# Patient Record
Sex: MALE | Race: WHITE | NOT HISPANIC OR LATINO
[De-identification: names, ages, dates, MRNs, and addresses within clinical notes are randomized per-mention and may not be internally consistent; named-entity substitution may affect disease eponyms.]

---

## 2017-06-21 ENCOUNTER — APPOINTMENT (OUTPATIENT)
Dept: OPHTHALMOLOGY | Facility: CLINIC | Age: 82
End: 2017-06-21

## 2017-12-20 ENCOUNTER — APPOINTMENT (OUTPATIENT)
Dept: OPHTHALMOLOGY | Facility: CLINIC | Age: 82
End: 2017-12-20
Payer: MEDICARE

## 2017-12-20 PROCEDURE — 92083 EXTENDED VISUAL FIELD XM: CPT

## 2017-12-20 PROCEDURE — 92012 INTRM OPH EXAM EST PATIENT: CPT

## 2017-12-20 PROCEDURE — 92133 CPTRZD OPH DX IMG PST SGM ON: CPT

## 2018-03-27 ENCOUNTER — APPOINTMENT (OUTPATIENT)
Dept: FAMILY MEDICINE | Facility: CLINIC | Age: 83
End: 2018-03-27
Payer: MEDICARE

## 2018-03-27 ENCOUNTER — LABORATORY RESULT (OUTPATIENT)
Age: 83
End: 2018-03-27

## 2018-03-27 ENCOUNTER — NON-APPOINTMENT (OUTPATIENT)
Age: 83
End: 2018-03-27

## 2018-03-27 VITALS
BODY MASS INDEX: 30.05 KG/M2 | HEART RATE: 86 BPM | HEIGHT: 66 IN | OXYGEN SATURATION: 99 % | RESPIRATION RATE: 14 BRPM | SYSTOLIC BLOOD PRESSURE: 122 MMHG | DIASTOLIC BLOOD PRESSURE: 56 MMHG | WEIGHT: 187 LBS

## 2018-03-27 DIAGNOSIS — M19.90 UNSPECIFIED OSTEOARTHRITIS, UNSPECIFIED SITE: ICD-10-CM

## 2018-03-27 DIAGNOSIS — Z81.8 FAMILY HISTORY OF OTHER MENTAL AND BEHAVIORAL DISORDERS: ICD-10-CM

## 2018-03-27 DIAGNOSIS — M48.00 SPINAL STENOSIS, SITE UNSPECIFIED: ICD-10-CM

## 2018-03-27 DIAGNOSIS — R35.0 FREQUENCY OF MICTURITION: ICD-10-CM

## 2018-03-27 PROCEDURE — 93000 ELECTROCARDIOGRAM COMPLETE: CPT

## 2018-03-27 PROCEDURE — 99204 OFFICE O/P NEW MOD 45 MIN: CPT | Mod: 25

## 2018-03-28 ENCOUNTER — APPOINTMENT (OUTPATIENT)
Dept: FAMILY MEDICINE | Facility: CLINIC | Age: 83
End: 2018-03-28
Payer: MEDICARE

## 2018-03-28 PROCEDURE — 36415 COLL VENOUS BLD VENIPUNCTURE: CPT

## 2018-03-29 LAB
ALBUMIN SERPL ELPH-MCNC: 4 G/DL
ALP BLD-CCNC: 113 U/L
ALT SERPL-CCNC: 12 U/L
ANION GAP SERPL CALC-SCNC: 16 MMOL/L
APPEARANCE: CLEAR
AST SERPL-CCNC: 22 U/L
BASOPHILS # BLD AUTO: 0.04 K/UL
BASOPHILS NFR BLD AUTO: 0.6 %
BILIRUB SERPL-MCNC: 0.4 MG/DL
BILIRUBIN URINE: NEGATIVE
BLOOD URINE: NEGATIVE
BUN SERPL-MCNC: 58 MG/DL
CALCIUM SERPL-MCNC: 9.9 MG/DL
CHLORIDE SERPL-SCNC: 104 MMOL/L
CHOLEST SERPL-MCNC: 166 MG/DL
CHOLEST/HDLC SERPL: 2.6 RATIO
CO2 SERPL-SCNC: 23 MMOL/L
COLOR: YELLOW
CREAT SERPL-MCNC: 2.13 MG/DL
EOSINOPHIL # BLD AUTO: 0.32 K/UL
EOSINOPHIL NFR BLD AUTO: 4.9 %
FERRITIN SERPL-MCNC: 56 NG/ML
FOLATE SERPL-MCNC: 12.4 NG/ML
GLUCOSE QUALITATIVE U: NEGATIVE MG/DL
GLUCOSE SERPL-MCNC: 146 MG/DL
HBA1C MFR BLD HPLC: 6.5 %
HCT VFR BLD CALC: 36.5 %
HDLC SERPL-MCNC: 65 MG/DL
HGB BLD-MCNC: 11.4 G/DL
IMM GRANULOCYTES NFR BLD AUTO: 0.3 %
IRON SATN MFR SERPL: 15 %
IRON SERPL-MCNC: 39 UG/DL
KETONES URINE: NEGATIVE
LDLC SERPL CALC-MCNC: 91 MG/DL
LEUKOCYTE ESTERASE URINE: ABNORMAL
LYMPHOCYTES # BLD AUTO: 1.56 K/UL
LYMPHOCYTES NFR BLD AUTO: 23.8 %
MAN DIFF?: NORMAL
MCHC RBC-ENTMCNC: 30 PG
MCHC RBC-ENTMCNC: 31.2 GM/DL
MCV RBC AUTO: 96.1 FL
MONOCYTES # BLD AUTO: 0.53 K/UL
MONOCYTES NFR BLD AUTO: 8.1 %
NEUTROPHILS # BLD AUTO: 4.08 K/UL
NEUTROPHILS NFR BLD AUTO: 62.3 %
NITRITE URINE: NEGATIVE
PH URINE: 6
PLATELET # BLD AUTO: 291 K/UL
POTASSIUM SERPL-SCNC: 4.3 MMOL/L
PROT SERPL-MCNC: 6.8 G/DL
PROTEIN URINE: NEGATIVE MG/DL
RBC # BLD: 3.8 M/UL
RBC # FLD: 17.8 %
SODIUM SERPL-SCNC: 143 MMOL/L
SPECIFIC GRAVITY URINE: 1.01
T4 SERPL-MCNC: 5.1 UG/DL
TIBC SERPL-MCNC: 252 UG/DL
TRIGL SERPL-MCNC: 48 MG/DL
TSH SERPL-ACNC: 2.22 UIU/ML
UIBC SERPL-MCNC: 213 UG/DL
URATE SERPL-MCNC: 8.4 MG/DL
UROBILINOGEN URINE: NEGATIVE MG/DL
VIT B12 SERPL-MCNC: 627 PG/ML
WBC # FLD AUTO: 6.55 K/UL

## 2018-04-04 ENCOUNTER — APPOINTMENT (OUTPATIENT)
Dept: NEPHROLOGY | Facility: CLINIC | Age: 83
End: 2018-04-04
Payer: MEDICARE

## 2018-04-04 VITALS
SYSTOLIC BLOOD PRESSURE: 130 MMHG | WEIGHT: 182 LBS | DIASTOLIC BLOOD PRESSURE: 62 MMHG | RESPIRATION RATE: 14 BRPM | TEMPERATURE: 97.8 F | HEIGHT: 64.5 IN | BODY MASS INDEX: 30.69 KG/M2 | HEART RATE: 75 BPM | OXYGEN SATURATION: 98 %

## 2018-04-04 PROCEDURE — 99205 OFFICE O/P NEW HI 60 MIN: CPT

## 2018-04-04 RX ORDER — PNV NO.95/FERROUS FUM/FOLIC AC 28MG-0.8MG
TABLET ORAL DAILY
Refills: 0 | Status: ACTIVE | COMMUNITY

## 2018-04-05 ENCOUNTER — APPOINTMENT (OUTPATIENT)
Dept: CARDIOLOGY | Facility: CLINIC | Age: 83
End: 2018-04-05
Payer: MEDICARE

## 2018-04-05 ENCOUNTER — NON-APPOINTMENT (OUTPATIENT)
Age: 83
End: 2018-04-05

## 2018-04-05 VITALS
OXYGEN SATURATION: 97 % | DIASTOLIC BLOOD PRESSURE: 74 MMHG | HEIGHT: 64.5 IN | SYSTOLIC BLOOD PRESSURE: 129 MMHG | BODY MASS INDEX: 30.69 KG/M2 | HEART RATE: 67 BPM | WEIGHT: 182 LBS

## 2018-04-05 LAB
CREAT SPEC-SCNC: 68 MG/DL
CREAT/PROT UR: 0.1 RATIO
PROT UR-MCNC: 5 MG/DL

## 2018-04-05 PROCEDURE — 99204 OFFICE O/P NEW MOD 45 MIN: CPT | Mod: 25

## 2018-04-16 ENCOUNTER — MEDICATION RENEWAL (OUTPATIENT)
Age: 83
End: 2018-04-16

## 2018-04-25 ENCOUNTER — NON-APPOINTMENT (OUTPATIENT)
Age: 83
End: 2018-04-25

## 2018-06-20 ENCOUNTER — APPOINTMENT (OUTPATIENT)
Dept: OPHTHALMOLOGY | Facility: CLINIC | Age: 83
End: 2018-06-20

## 2018-07-17 ENCOUNTER — APPOINTMENT (OUTPATIENT)
Dept: CARDIOLOGY | Facility: CLINIC | Age: 83
End: 2018-07-17

## 2018-07-18 ENCOUNTER — APPOINTMENT (OUTPATIENT)
Dept: NEPHROLOGY | Facility: CLINIC | Age: 83
End: 2018-07-18

## 2018-08-03 ENCOUNTER — NON-APPOINTMENT (OUTPATIENT)
Age: 83
End: 2018-08-03

## 2018-08-03 ENCOUNTER — APPOINTMENT (OUTPATIENT)
Dept: CARDIOLOGY | Facility: CLINIC | Age: 83
End: 2018-08-03
Payer: MEDICARE

## 2018-08-03 VITALS
SYSTOLIC BLOOD PRESSURE: 147 MMHG | HEART RATE: 87 BPM | WEIGHT: 190 LBS | DIASTOLIC BLOOD PRESSURE: 78 MMHG | HEIGHT: 64.5 IN | BODY MASS INDEX: 32.04 KG/M2 | OXYGEN SATURATION: 100 %

## 2018-08-03 PROCEDURE — 93000 ELECTROCARDIOGRAM COMPLETE: CPT

## 2018-08-03 PROCEDURE — 99213 OFFICE O/P EST LOW 20 MIN: CPT | Mod: 25

## 2018-08-03 RX ORDER — ASPIRIN ENTERIC COATED TABLETS 81 MG 81 MG/1
81 TABLET, DELAYED RELEASE ORAL DAILY
Refills: 0 | Status: DISCONTINUED | COMMUNITY
End: 2018-08-03

## 2018-08-08 ENCOUNTER — APPOINTMENT (OUTPATIENT)
Dept: NEPHROLOGY | Facility: CLINIC | Age: 83
End: 2018-08-08
Payer: MEDICARE

## 2018-08-08 VITALS
SYSTOLIC BLOOD PRESSURE: 134 MMHG | HEIGHT: 64.5 IN | OXYGEN SATURATION: 97 % | HEART RATE: 85 BPM | WEIGHT: 195 LBS | BODY MASS INDEX: 32.89 KG/M2 | DIASTOLIC BLOOD PRESSURE: 60 MMHG

## 2018-08-08 PROCEDURE — 99215 OFFICE O/P EST HI 40 MIN: CPT | Mod: 25

## 2018-08-08 PROCEDURE — 36415 COLL VENOUS BLD VENIPUNCTURE: CPT

## 2018-08-09 LAB
APPEARANCE: CLEAR
BACTERIA: NEGATIVE
BASOPHILS # BLD AUTO: 0.02 K/UL
BASOPHILS NFR BLD AUTO: 0.3 %
BILIRUBIN URINE: NEGATIVE
BLOOD URINE: NEGATIVE
COLOR: YELLOW
EOSINOPHIL # BLD AUTO: 0.12 K/UL
EOSINOPHIL NFR BLD AUTO: 1.8 %
ESTIMATED AVERAGE GLUCOSE: 131 MG/DL
GLUCOSE QUALITATIVE U: NEGATIVE MG/DL
HBA1C MFR BLD HPLC: 6.2 %
HCT VFR BLD CALC: 38.4 %
HGB BLD-MCNC: 12.3 G/DL
HYALINE CASTS: 0 /LPF
IMM GRANULOCYTES NFR BLD AUTO: 0.3 %
KETONES URINE: NEGATIVE
LEUKOCYTE ESTERASE URINE: ABNORMAL
LYMPHOCYTES # BLD AUTO: 1.46 K/UL
LYMPHOCYTES NFR BLD AUTO: 22.4 %
MAN DIFF?: NORMAL
MCHC RBC-ENTMCNC: 31 PG
MCHC RBC-ENTMCNC: 32 GM/DL
MCV RBC AUTO: 96.7 FL
MICROSCOPIC-UA: NORMAL
MONOCYTES # BLD AUTO: 0.45 K/UL
MONOCYTES NFR BLD AUTO: 6.9 %
NEUTROPHILS # BLD AUTO: 4.44 K/UL
NEUTROPHILS NFR BLD AUTO: 68.3 %
NITRITE URINE: NEGATIVE
PH URINE: 5
PLATELET # BLD AUTO: 246 K/UL
PROTEIN URINE: NEGATIVE MG/DL
RBC # BLD: 3.97 M/UL
RBC # FLD: 16.3 %
RED BLOOD CELLS URINE: 1 /HPF
SPECIFIC GRAVITY URINE: 1.01
SQUAMOUS EPITHELIAL CELLS: 1 /HPF
TSH SERPL-ACNC: 2.07 UIU/ML
URATE SERPL-MCNC: 7.1 MG/DL
UROBILINOGEN URINE: NEGATIVE MG/DL
WBC # FLD AUTO: 6.51 K/UL
WHITE BLOOD CELLS URINE: 18 /HPF

## 2018-08-14 LAB
ALBUMIN SERPL ELPH-MCNC: 4.5 G/DL
ANION GAP SERPL CALC-SCNC: 22 MMOL/L
BUN SERPL-MCNC: 63 MG/DL
CALCIUM SERPL-MCNC: 9.4 MG/DL
CHLORIDE SERPL-SCNC: 100 MMOL/L
CO2 SERPL-SCNC: 16 MMOL/L
CREAT SERPL-MCNC: 2.12 MG/DL
CREAT SPEC-SCNC: 21 MG/DL
CREAT/PROT UR: NORMAL
GLUCOSE SERPL-MCNC: 64 MG/DL
PHOSPHATE SERPL-MCNC: 4.5 MG/DL
POTASSIUM SERPL-SCNC: 5.1 MMOL/L
PROT UR-MCNC: <4 MG/DL
SODIUM SERPL-SCNC: 138 MMOL/L

## 2018-08-16 ENCOUNTER — APPOINTMENT (OUTPATIENT)
Dept: FAMILY MEDICINE | Facility: CLINIC | Age: 83
End: 2018-08-16
Payer: MEDICARE

## 2018-08-16 PROCEDURE — 99214 OFFICE O/P EST MOD 30 MIN: CPT

## 2018-08-16 NOTE — HISTORY OF PRESENT ILLNESS
[Family Member] : family member [FreeTextEntry1] : here for follow up [de-identified] : 87 yr old male is here with DIL\par  Multiple medical problems. Moved to [facility] recently after being Hospitalized. Released from [facility] 2/18.\par Hx of Afib on Eliquis\par Anemia: iron deficiency, recent labs, Hb 12.3\par CKD last creatinine 2.13, Hx of partial nephrectomy for benign tumor. Seen by Nephrologist last week.

## 2018-08-16 NOTE — PHYSICAL EXAM

## 2018-08-16 NOTE — ASSESSMENT
[FreeTextEntry1] : A Fib: On Eliquis\par Cardiology consult reviewed\par Anemia:  labs reviewed\par CKD, last creatinine 2.13. Seen by Nephrology, follow up in 6 months

## 2018-08-17 ENCOUNTER — APPOINTMENT (OUTPATIENT)
Dept: CARDIOLOGY | Facility: CLINIC | Age: 83
End: 2018-08-17

## 2018-10-20 ENCOUNTER — MEDICATION RENEWAL (OUTPATIENT)
Age: 83
End: 2018-10-20

## 2018-11-09 ENCOUNTER — RX RENEWAL (OUTPATIENT)
Age: 83
End: 2018-11-09

## 2018-11-28 ENCOUNTER — RX RENEWAL (OUTPATIENT)
Age: 83
End: 2018-11-28

## 2018-12-05 ENCOUNTER — LABORATORY RESULT (OUTPATIENT)
Age: 83
End: 2018-12-05

## 2018-12-05 ENCOUNTER — APPOINTMENT (OUTPATIENT)
Dept: FAMILY MEDICINE | Facility: CLINIC | Age: 83
End: 2018-12-05
Payer: MEDICARE

## 2018-12-05 VITALS
RESPIRATION RATE: 16 BRPM | DIASTOLIC BLOOD PRESSURE: 64 MMHG | WEIGHT: 196 LBS | OXYGEN SATURATION: 98 % | BODY MASS INDEX: 33.46 KG/M2 | SYSTOLIC BLOOD PRESSURE: 120 MMHG | HEIGHT: 64 IN | TEMPERATURE: 97.8 F | HEART RATE: 93 BPM

## 2018-12-05 PROCEDURE — 36415 COLL VENOUS BLD VENIPUNCTURE: CPT

## 2018-12-05 PROCEDURE — 99214 OFFICE O/P EST MOD 30 MIN: CPT | Mod: 25

## 2018-12-05 NOTE — PHYSICAL EXAM

## 2018-12-05 NOTE — HISTORY OF PRESENT ILLNESS
[Family Member] : family member [FreeTextEntry1] : here for follow up [de-identified] : 88 yr old male is here for follow up\par Rash on abdomen for 6 weeks. Itchy healing\par  Multiple medical problems. Moved to [facility] house recently after being Hospitalized. Released from [facility] 2/18.\par Hx of Afib on Eliquis\par Anemia: iron deficiency, recent labs, Hb 12.3\par CKD last creatinine 2.13, Hx of partial nephrectomy for benign tumor. Seen by Nephrologist last week.\par Seen by Nephrologist has follow up 2/18

## 2018-12-05 NOTE — ASSESSMENT
[FreeTextEntry1] : A Fib: On Eliquis\par Cardiology consult reviewed\par Anemia:  labs reviewed\par CKD, last creatinine 2.13. Seen by Nephrology, follow up in 6 months\par Rash/ ? shingles/ Dermatitis\par Steroid cream for 1 week\par Follow up if not improved. No pain

## 2018-12-12 LAB
ALBUMIN SERPL ELPH-MCNC: 4.3 G/DL
ALP BLD-CCNC: 102 U/L
ALT SERPL-CCNC: 15 U/L
ANION GAP SERPL CALC-SCNC: 14 MMOL/L
APPEARANCE: CLEAR
AST SERPL-CCNC: 21 U/L
BASOPHILS # BLD AUTO: 0.02 K/UL
BASOPHILS NFR BLD AUTO: 0.2 %
BILIRUB SERPL-MCNC: 0.6 MG/DL
BILIRUBIN URINE: NEGATIVE
BLOOD URINE: NEGATIVE
BUN SERPL-MCNC: 40 MG/DL
CALCIUM SERPL-MCNC: 9.7 MG/DL
CHLORIDE SERPL-SCNC: 105 MMOL/L
CHOLEST SERPL-MCNC: 180 MG/DL
CHOLEST/HDLC SERPL: 2.5 RATIO
CO2 SERPL-SCNC: 22 MMOL/L
COLOR: YELLOW
CREAT SERPL-MCNC: 1.92 MG/DL
EOSINOPHIL # BLD AUTO: 0.6 K/UL
EOSINOPHIL NFR BLD AUTO: 7.5 %
FERRITIN SERPL-MCNC: 209 NG/ML
FOLATE SERPL-MCNC: 11 NG/ML
GLUCOSE QUALITATIVE U: NEGATIVE MG/DL
GLUCOSE SERPL-MCNC: 113 MG/DL
HBA1C MFR BLD HPLC: 6.1 %
HCT VFR BLD CALC: 38 %
HDLC SERPL-MCNC: 72 MG/DL
HGB BLD-MCNC: 12.5 G/DL
IMM GRANULOCYTES NFR BLD AUTO: 0.4 %
IRON SATN MFR SERPL: 25 %
IRON SERPL-MCNC: 65 UG/DL
KETONES URINE: NEGATIVE
LDLC SERPL CALC-MCNC: 98 MG/DL
LEUKOCYTE ESTERASE URINE: ABNORMAL
LYMPHOCYTES # BLD AUTO: 1.38 K/UL
LYMPHOCYTES NFR BLD AUTO: 17.2 %
MAN DIFF?: NORMAL
MCHC RBC-ENTMCNC: 31.2 PG
MCHC RBC-ENTMCNC: 32.9 GM/DL
MCV RBC AUTO: 94.8 FL
MONOCYTES # BLD AUTO: 0.75 K/UL
MONOCYTES NFR BLD AUTO: 9.3 %
NEUTROPHILS # BLD AUTO: 5.25 K/UL
NEUTROPHILS NFR BLD AUTO: 65.4 %
NITRITE URINE: NEGATIVE
PH URINE: 5
PLATELET # BLD AUTO: 209 K/UL
POTASSIUM SERPL-SCNC: 4.6 MMOL/L
PROT SERPL-MCNC: 6.8 G/DL
PROTEIN URINE: NEGATIVE MG/DL
RBC # BLD: 4.01 M/UL
RBC # FLD: 15.7 %
SODIUM SERPL-SCNC: 141 MMOL/L
SPECIFIC GRAVITY URINE: 1.01
T4 SERPL-MCNC: 5.1 UG/DL
TIBC SERPL-MCNC: 263 UG/DL
TRIGL SERPL-MCNC: 52 MG/DL
TSH SERPL-ACNC: 2.75 UIU/ML
UIBC SERPL-MCNC: 198 UG/DL
URATE SERPL-MCNC: 6.7 MG/DL
UROBILINOGEN URINE: NEGATIVE MG/DL
VIT B12 SERPL-MCNC: 714 PG/ML
WBC # FLD AUTO: 8.03 K/UL

## 2018-12-28 ENCOUNTER — RX RENEWAL (OUTPATIENT)
Age: 83
End: 2018-12-28

## 2019-02-05 ENCOUNTER — NON-APPOINTMENT (OUTPATIENT)
Age: 84
End: 2019-02-05

## 2019-02-05 ENCOUNTER — APPOINTMENT (OUTPATIENT)
Dept: CARDIOLOGY | Facility: CLINIC | Age: 84
End: 2019-02-05
Payer: MEDICARE

## 2019-02-05 VITALS — HEART RATE: 75 BPM | HEIGHT: 64 IN | OXYGEN SATURATION: 97 % | BODY MASS INDEX: 32.78 KG/M2 | WEIGHT: 192 LBS

## 2019-02-05 PROCEDURE — 93000 ELECTROCARDIOGRAM COMPLETE: CPT

## 2019-02-05 PROCEDURE — 99214 OFFICE O/P EST MOD 30 MIN: CPT | Mod: 25

## 2019-02-05 NOTE — DISCUSSION/SUMMARY
[FreeTextEntry1] : Pt is an 89 y/o M with PMH HTN, HLD, PVD, afib on Eliquis, CKD who presents today for f/u.  \par TTE 02/2018 shows EF = 60-65% without significant valvular disease - repeat TTE\par afib: HR well controlled.  He denies any bleeding issues on Eliquis.  CHADSVasc = 5\par HTN: well controlled, c/w norvasc, losartan, lasix\par PVD: discuss statin at next OV, c/w ASA\par Check stress test to eval for ischemia \par He will f/u in 3 mnths\par The described plan was discussed with the pt.  All questions and concerns were addressed to the best of my knowledge.\par

## 2019-02-05 NOTE — HISTORY OF PRESENT ILLNESS
[FreeTextEntry1] : Pt is a 87 y/o M who presents today for f/u. PMH afib on Eliquis, HTN, PVD s/p RLE stent, HLD.  He states that he is overall feeling ok, notes some KING.  Pt denies CP, SOB, diaphoresis, palpitations, dizziness, syncope, LE edema, PND.\par He is compliant with medications.  He is accompanied by his son\par \par PMH: PVD s/p peripheral stent RLE with Dr Singh, afib on Eliquis, ambulates with cane, HLD, HTN, colon cancer\par Smoking status: quit in 1963\par Current exercise: none\par Daily water intake: 50 oz\par Daily caffeine intake: 1-2 cups coffee\par OTC medications: ASA\par Family hx: pt denies any immediate family history cardiac disease\par Previous cardiac testing: unsure\par Previous hospitalizations: gout/cellulitis\par surgeries: colon cancer, partial nephrectomy 2006 benign mass\par

## 2019-02-05 NOTE — PHYSICAL EXAM
[General Appearance - Well Developed] : well developed [Normal Appearance] : normal appearance [Well Groomed] : well groomed [General Appearance - Well Nourished] : well nourished [No Deformities] : no deformities [General Appearance - In No Acute Distress] : no acute distress [Normal Conjunctiva] : the conjunctiva exhibited no abnormalities [Eyelids - No Xanthelasma] : the eyelids demonstrated no xanthelasmas [Normal Oral Mucosa] : normal oral mucosa [No Oral Pallor] : no oral pallor [No Oral Cyanosis] : no oral cyanosis [Respiration, Rhythm And Depth] : normal respiratory rhythm and effort [Exaggerated Use Of Accessory Muscles For Inspiration] : no accessory muscle use [Auscultation Breath Sounds / Voice Sounds] : lungs were clear to auscultation bilaterally [Heart Rate And Rhythm] : heart rate and rhythm were normal [Heart Sounds] : normal S1 and S2 [Murmurs] : no murmurs present [Arterial Pulses Normal] : the arterial pulses were normal [Edema] : no peripheral edema present [Abdomen Soft] : soft [Abdomen Tenderness] : non-tender [Abdomen Mass (___ Cm)] : no abdominal mass palpated [Abnormal Walk] : normal gait [Gait - Sufficient For Exercise Testing] : the gait was sufficient for exercise testing [Nail Clubbing] : no clubbing of the fingernails [Cyanosis, Localized] : no localized cyanosis [Petechial Hemorrhages (___cm)] : no petechial hemorrhages [] : no ischemic changes [Oriented To Time, Place, And Person] : oriented to person, place, and time [Impaired Insight] : insight and judgment were intact [Affect] : the affect was normal [Mood] : the mood was normal [No Anxiety] : not feeling anxious [FreeTextEntry1] : no JVD or bruits

## 2019-02-06 ENCOUNTER — APPOINTMENT (OUTPATIENT)
Dept: NEPHROLOGY | Facility: CLINIC | Age: 84
End: 2019-02-06
Payer: MEDICARE

## 2019-02-06 VITALS
WEIGHT: 194 LBS | HEART RATE: 72 BPM | SYSTOLIC BLOOD PRESSURE: 120 MMHG | DIASTOLIC BLOOD PRESSURE: 62 MMHG | OXYGEN SATURATION: 98 % | HEIGHT: 64 IN | BODY MASS INDEX: 33.12 KG/M2

## 2019-02-06 PROCEDURE — 99215 OFFICE O/P EST HI 40 MIN: CPT

## 2019-02-06 NOTE — ASSESSMENT
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

## 2019-02-06 NOTE — PHYSICAL EXAM
[General Appearance - Alert] : alert [General Appearance - In No Acute Distress] : in no acute distress [General Appearance - Well Nourished] : well nourished [General Appearance - Well Developed] : well developed [Sclera] : the sclera and conjunctiva were normal [Outer Ear] : the ears and nose were normal in appearance [Hearing Threshold Finger Rub Not [place]] : hearing was normal [Both Tympanic Membranes Were Examined] : both tympanic membranes were normal [Neck Appearance] : the appearance of the neck was normal [Neck Cervical Mass (___cm)] : no neck mass was observed [] : no respiratory distress [Respiration, Rhythm And Depth] : normal respiratory rhythm and effort [Auscultation Breath Sounds / Voice Sounds] : lungs were clear to auscultation bilaterally [Heart Rate And Rhythm] : heart rate was normal and rhythm regular [Heart Sounds] : normal S1 and S2 [Arterial Pulses Carotid] : carotid pulses were normal with no bruits [Bowel Sounds] : normal bowel sounds [Abdomen Soft] : soft [Abdomen Tenderness] : non-tender [No CVA Tenderness] : no ~M costovertebral angle tenderness [Abnormal Walk] : normal gait [Skin Color & Pigmentation] : normal skin color and pigmentation [Cranial Nerves] : cranial nerves 2-12 were intact [Oriented To Time, Place, And Person] : oriented to person, place, and time

## 2019-02-06 NOTE — HISTORY OF PRESENT ILLNESS
[FreeTextEntry1] : Patient is an 87 year old male with past medical history of BPH, HTN, Gout, Pre-Diabetic, benign R sided renal mass s/p partial nephrectomy in 2006, here for initial evaluation of elevated SCr. Patient is followed by Dr. Suggs. Patient seen and examined; is in no distress; accompanied by his daughter; able to provide full history; feels well; appears well. Patient states he drank quite a bit of alcohol in the past; now drinks a glass of wine or beer with dinner. Found to have Cr of 2.13 with some mild anemia. Pt seen and examined today for follow up. Has been feeling very well; drinking wine daily and enjoying life. Good energy, good appetite; lives with daughter. \par \par Current: Pt seen and examined; no complaints; feels well; last GFR 30

## 2019-02-14 ENCOUNTER — APPOINTMENT (OUTPATIENT)
Dept: CARDIOLOGY | Facility: CLINIC | Age: 84
End: 2019-02-14
Payer: MEDICARE

## 2019-02-14 PROCEDURE — 93306 TTE W/DOPPLER COMPLETE: CPT

## 2019-02-19 LAB
ALBUMIN SERPL ELPH-MCNC: 4.1 G/DL
ANION GAP SERPL CALC-SCNC: 17 MMOL/L
APPEARANCE: CLEAR
BACTERIA: ABNORMAL
BILIRUBIN URINE: NEGATIVE
BLOOD URINE: NEGATIVE
BUN SERPL-MCNC: 64 MG/DL
CALCIUM SERPL-MCNC: 9.5 MG/DL
CHLORIDE SERPL-SCNC: 102 MMOL/L
CO2 SERPL-SCNC: 20 MMOL/L
COLOR: YELLOW
CREAT SERPL-MCNC: 2.47 MG/DL
CREAT SPEC-SCNC: 122 MG/DL
CREAT/PROT UR: 0.1 RATIO
GLUCOSE QUALITATIVE U: NEGATIVE MG/DL
GLUCOSE SERPL-MCNC: 121 MG/DL
HYALINE CASTS: 14 /LPF
KETONES URINE: NEGATIVE
LEUKOCYTE ESTERASE URINE: ABNORMAL
MICROSCOPIC-UA: NORMAL
NITRITE URINE: NEGATIVE
PH URINE: 5
PHOSPHATE SERPL-MCNC: 3.6 MG/DL
POTASSIUM SERPL-SCNC: 4.6 MMOL/L
PROT UR-MCNC: 8 MG/DL
PROTEIN URINE: NEGATIVE MG/DL
RED BLOOD CELLS URINE: 2 /HPF
SODIUM SERPL-SCNC: 139 MMOL/L
SPECIFIC GRAVITY URINE: 1.01
SQUAMOUS EPITHELIAL CELLS: 3 /HPF
UROBILINOGEN URINE: NEGATIVE MG/DL
WHITE BLOOD CELLS URINE: 32 /HPF

## 2019-02-20 ENCOUNTER — APPOINTMENT (OUTPATIENT)
Dept: CARDIOLOGY | Facility: CLINIC | Age: 84
End: 2019-02-20
Payer: MEDICARE

## 2019-02-20 PROCEDURE — 78452 HT MUSCLE IMAGE SPECT MULT: CPT

## 2019-02-20 PROCEDURE — A9500: CPT

## 2019-02-20 PROCEDURE — 93017 CV STRESS TEST TRACING ONLY: CPT

## 2019-02-20 PROCEDURE — 93018 CV STRESS TEST I&R ONLY: CPT

## 2019-02-21 ENCOUNTER — OTHER (OUTPATIENT)
Age: 84
End: 2019-02-21

## 2019-02-22 ENCOUNTER — APPOINTMENT (OUTPATIENT)
Dept: CARDIOLOGY | Facility: CLINIC | Age: 84
End: 2019-02-22
Payer: MEDICARE

## 2019-02-26 ENCOUNTER — RX RENEWAL (OUTPATIENT)
Age: 84
End: 2019-02-26

## 2019-02-27 ENCOUNTER — RX RENEWAL (OUTPATIENT)
Age: 84
End: 2019-02-27

## 2019-03-01 ENCOUNTER — RX RENEWAL (OUTPATIENT)
Age: 84
End: 2019-03-01

## 2019-03-26 ENCOUNTER — APPOINTMENT (OUTPATIENT)
Dept: FAMILY MEDICINE | Facility: CLINIC | Age: 84
End: 2019-03-26

## 2019-04-15 ENCOUNTER — RX RENEWAL (OUTPATIENT)
Age: 84
End: 2019-04-15

## 2019-04-18 ENCOUNTER — LABORATORY RESULT (OUTPATIENT)
Age: 84
End: 2019-04-18

## 2019-04-18 ENCOUNTER — APPOINTMENT (OUTPATIENT)
Dept: FAMILY MEDICINE | Facility: CLINIC | Age: 84
End: 2019-04-18
Payer: MEDICARE

## 2019-04-18 VITALS
TEMPERATURE: 97.6 F | OXYGEN SATURATION: 98 % | HEART RATE: 55 BPM | BODY MASS INDEX: 31.92 KG/M2 | RESPIRATION RATE: 16 BRPM | WEIGHT: 187 LBS | HEIGHT: 64 IN | DIASTOLIC BLOOD PRESSURE: 60 MMHG | SYSTOLIC BLOOD PRESSURE: 110 MMHG

## 2019-04-18 DIAGNOSIS — Z13.1 ENCOUNTER FOR SCREENING FOR DIABETES MELLITUS: ICD-10-CM

## 2019-04-18 DIAGNOSIS — Z13.29 ENCOUNTER FOR SCREENING FOR OTHER SUSPECTED ENDOCRINE DISORDER: ICD-10-CM

## 2019-04-18 DIAGNOSIS — L30.9 DERMATITIS, UNSPECIFIED: ICD-10-CM

## 2019-04-18 PROCEDURE — 36415 COLL VENOUS BLD VENIPUNCTURE: CPT

## 2019-04-18 PROCEDURE — 99397 PER PM REEVAL EST PAT 65+ YR: CPT | Mod: 25

## 2019-04-18 PROCEDURE — G0444 DEPRESSION SCREEN ANNUAL: CPT | Mod: 59

## 2019-04-18 RX ORDER — ASPIRIN 81 MG
81 TABLET, DELAYED RELEASE (ENTERIC COATED) ORAL
Refills: 0 | Status: COMPLETED | COMMUNITY
End: 2019-04-18

## 2019-04-18 RX ORDER — FUROSEMIDE 40 MG/1
40 TABLET ORAL
Qty: 90 | Refills: 0 | Status: COMPLETED | COMMUNITY
Start: 2017-09-12 | End: 2019-04-18

## 2019-04-18 RX ORDER — MOMETASONE FUROATE 1 MG/G
0.1 CREAM TOPICAL TWICE DAILY
Qty: 1 | Refills: 0 | Status: COMPLETED | COMMUNITY
Start: 2018-12-05 | End: 2019-04-18

## 2019-04-18 NOTE — HISTORY OF PRESENT ILLNESS
[FreeTextEntry1] : cpe [de-identified] : Patient is here for a physical. \par Overall he is doing well, no major complaints. \par He has a rash on his abdomen.  Was using steroid cream but it did not help.

## 2019-04-18 NOTE — PHYSICAL EXAM
[No Acute Distress] : no acute distress [Well Nourished] : well nourished [Well Developed] : well developed [Well-Appearing] : well-appearing [Normal Sclera/Conjunctiva] : normal sclera/conjunctiva [PERRL] : pupils equal round and reactive to light [EOMI] : extraocular movements intact [Normal Oropharynx] : the oropharynx was normal [Normal Outer Ear/Nose] : the outer ears and nose were normal in appearance [Normal TMs] : both tympanic membranes were normal [Supple] : supple [No Lymphadenopathy] : no lymphadenopathy [Thyroid Normal, No Nodules] : the thyroid was normal and there were no nodules present [No Respiratory Distress] : no respiratory distress  [Clear to Auscultation] : lungs were clear to auscultation bilaterally [No Accessory Muscle Use] : no accessory muscle use [Normal S1, S2] : normal S1 and S2 [Pedal Pulses Present] : the pedal pulses are present [No Edema] : there was no peripheral edema [Soft] : abdomen soft [Non Tender] : non-tender [Non-distended] : non-distended [No HSM] : no HSM [No Masses] : no abdominal mass palpated [Normal Bowel Sounds] : normal bowel sounds [Normal Posterior Cervical Nodes] : no posterior cervical lymphadenopathy [Normal Anterior Cervical Nodes] : no anterior cervical lymphadenopathy [No CVA Tenderness] : no CVA  tenderness [No Spinal Tenderness] : no spinal tenderness [No Joint Swelling] : no joint swelling [Grossly Normal Strength/Tone] : grossly normal strength/tone [Normal Gait] : normal gait [No Focal Deficits] : no focal deficits [Deep Tendon Reflexes (DTR)] : deep tendon reflexes were 2+ and symmetric [Coordination Grossly Intact] : coordination grossly intact [Normal Insight/Judgement] : insight and judgment were intact [Normal Affect] : the affect was normal [de-identified] : rash on abdomen - erythematous, dry flaky skin [de-identified] : bradycardia

## 2019-04-18 NOTE — ASSESSMENT
[FreeTextEntry1] : Health maintenance\par - comprehensive labs\par - had EKG and cardiac testing in February\par - believes he had pneumonia vaccine at pharmacy this season -will check his record at home\par - negative depression screening \par \par Dermatitis\par - not resolved with steroid cream\par - ? fungal\par - start clotrimazole / betameth cream\par - start antihistamine for itch\par - if not resolving would recommend seeing dermatology \par \par Hypertension\par - well controlled\par - continue current meds\par \par Atrial fibrillation\par - rate controlled\par - on Eliquis\par \par CKD\par - following regularly with nephrology\par - has appointment next month\par - kidney function stable\par - will check labs today

## 2019-04-18 NOTE — HEALTH RISK ASSESSMENT
[Good] : ~his/her~  mood as  good [No falls in past year] : Patient reported no falls in the past year [0] : 2) Feeling down, depressed, or hopeless: Not at all (0) [HIV Test offered] : HIV Test offered [Hepatitis C test offered] : Hepatitis C test offered [None] : None [With Family] : lives with family [Retired] : retired [] :  [Feels Safe at Home] : Feels safe at home [Fully functional (bathing, dressing, toileting, transferring, walking, feeding)] : Fully functional (bathing, dressing, toileting, transferring, walking, feeding) [Fully functional (using the telephone, shopping, preparing meals, housekeeping, doing laundry, using] : Fully functional and needs no help or supervision to perform IADLs (using the telephone, shopping, preparing meals, housekeeping, doing laundry, using transportation, managing medications and managing finances) [Smoke Detector] : smoke detector [Carbon Monoxide Detector] : carbon monoxide detector [Seat Belt] :  uses seat belt [With Patient/Caregiver] : With Patient/Caregiver [Name: ___] : Health Care Proxy's Name: [unfilled]  [Relationship: ___] : Relationship: [unfilled] [] : No [de-identified] : drinks daily, one with dinner  [YearQuit] : 1963 [[id number]] : 0 [Change in mental status noted] : No change in mental status noted [Language] : denies difficulty with language [Behavior] : denies difficulty with behavior [Reasoning] : denies difficulty with reasoning [Reports changes in hearing] : Reports no changes in hearing [Reports changes in dental health] : Reports no changes in dental health [Reports changes in vision] : Reports no changes in vision [ColonoscopyComments] : no longer required [AdvancecareDate] : 04/19

## 2019-04-18 NOTE — COUNSELING
[Healthy eating counseling provided] : healthy eating [Activity counseling provided] : activity [Good understanding] : Patient has a good understanding of disease, goals and obesity follow-up plan [ - Annual Depression Screening] : Annual Depression Screening [de-identified] : diet - no restrictions\par exercise - tries to remain active, walking

## 2019-04-19 LAB
25(OH)D3 SERPL-MCNC: 29 NG/ML
ALBUMIN SERPL ELPH-MCNC: 4.5 G/DL
ALP BLD-CCNC: 115 U/L
ALT SERPL-CCNC: 18 U/L
ANION GAP SERPL CALC-SCNC: 15 MMOL/L
APPEARANCE: CLEAR
AST SERPL-CCNC: 19 U/L
BASOPHILS # BLD AUTO: 0.05 K/UL
BASOPHILS NFR BLD AUTO: 0.8 %
BILIRUB SERPL-MCNC: 0.6 MG/DL
BILIRUBIN URINE: NEGATIVE
BLOOD URINE: NEGATIVE
BUN SERPL-MCNC: 75 MG/DL
CALCIUM SERPL-MCNC: 9.4 MG/DL
CHLORIDE SERPL-SCNC: 104 MMOL/L
CHOLEST SERPL-MCNC: 153 MG/DL
CHOLEST/HDLC SERPL: 2.3 RATIO
CO2 SERPL-SCNC: 22 MMOL/L
COLOR: COLORLESS
CREAT SERPL-MCNC: 2.72 MG/DL
EOSINOPHIL # BLD AUTO: 0.32 K/UL
EOSINOPHIL NFR BLD AUTO: 5.3 %
ESTIMATED AVERAGE GLUCOSE: 123 MG/DL
GLUCOSE QUALITATIVE U: NEGATIVE
GLUCOSE SERPL-MCNC: 133 MG/DL
HBA1C MFR BLD HPLC: 5.9 %
HCT VFR BLD CALC: 36.9 %
HDLC SERPL-MCNC: 66 MG/DL
HGB BLD-MCNC: 11.9 G/DL
HIV1+2 AB SPEC QL IA.RAPID: NONREACTIVE
IMM GRANULOCYTES NFR BLD AUTO: 0.3 %
IRON SATN MFR SERPL: 20 %
IRON SERPL-MCNC: 53 UG/DL
KETONES URINE: NEGATIVE
LDLC SERPL CALC-MCNC: 79 MG/DL
LEUKOCYTE ESTERASE URINE: ABNORMAL
LYMPHOCYTES # BLD AUTO: 1.09 K/UL
LYMPHOCYTES NFR BLD AUTO: 18 %
MAN DIFF?: NORMAL
MCHC RBC-ENTMCNC: 32.1 PG
MCHC RBC-ENTMCNC: 32.2 GM/DL
MCV RBC AUTO: 99.5 FL
MONOCYTES # BLD AUTO: 0.58 K/UL
MONOCYTES NFR BLD AUTO: 9.6 %
NEUTROPHILS # BLD AUTO: 3.99 K/UL
NEUTROPHILS NFR BLD AUTO: 66 %
NITRITE URINE: NEGATIVE
PH URINE: 5
PLATELET # BLD AUTO: 204 K/UL
POTASSIUM SERPL-SCNC: 5.1 MMOL/L
PROT SERPL-MCNC: 6.6 G/DL
PROTEIN URINE: NEGATIVE
RBC # BLD: 3.71 M/UL
RBC # FLD: 15.2 %
SODIUM SERPL-SCNC: 141 MMOL/L
SPECIFIC GRAVITY URINE: 1.01
T4 FREE SERPL-MCNC: 1.1 NG/DL
TIBC SERPL-MCNC: 267 UG/DL
TRIGL SERPL-MCNC: 39 MG/DL
TSH SERPL-ACNC: 3.42 UIU/ML
UIBC SERPL-MCNC: 214 UG/DL
UROBILINOGEN URINE: NORMAL
WBC # FLD AUTO: 6.05 K/UL

## 2019-04-30 ENCOUNTER — RX RENEWAL (OUTPATIENT)
Age: 84
End: 2019-04-30

## 2019-05-07 ENCOUNTER — OTHER (OUTPATIENT)
Age: 84
End: 2019-05-07

## 2019-05-07 ENCOUNTER — RX RENEWAL (OUTPATIENT)
Age: 84
End: 2019-05-07

## 2019-05-15 ENCOUNTER — APPOINTMENT (OUTPATIENT)
Dept: NEPHROLOGY | Facility: CLINIC | Age: 84
End: 2019-05-15
Payer: MEDICARE

## 2019-05-15 VITALS
HEIGHT: 64 IN | BODY MASS INDEX: 31.92 KG/M2 | SYSTOLIC BLOOD PRESSURE: 108 MMHG | WEIGHT: 187 LBS | DIASTOLIC BLOOD PRESSURE: 58 MMHG

## 2019-05-15 PROCEDURE — 99215 OFFICE O/P EST HI 40 MIN: CPT

## 2019-05-15 RX ORDER — LOSARTAN POTASSIUM 50 MG/1
50 TABLET, FILM COATED ORAL DAILY
Qty: 90 | Refills: 1 | Status: DISCONTINUED | COMMUNITY
Start: 2019-04-15 | End: 2019-05-15

## 2019-05-15 NOTE — HISTORY OF PRESENT ILLNESS
[FreeTextEntry1] : Patient is an 87 year old male with past medical history of BPH, HTN, Gout, Pre-Diabetic, benign R sided renal mass s/p partial nephrectomy in 2006, here for initial evaluation of elevated SCr. Patient is followed by Dr. Suggs. Patient seen and examined; is in no distress; accompanied by his daughter; able to provide full history; feels well; appears well. Patient states he drank quite a bit of alcohol in the past; now drinks a glass of wine or beer with dinner. Found to have Cr of 2.13 with some mild anemia. Pt seen and examined today for follow up. Has been feeling very well; drinking wine daily and enjoying life. Good energy, good appetite; lives with daughter. Pt seen and examined; no complaints; feels well; last GFR 30\par \par Current: Pt seen/examined; doing well; Cr slightly up; 2.72 last month. Meds reviewed

## 2019-05-15 NOTE — ASSESSMENT
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

## 2019-05-15 NOTE — PHYSICAL EXAM
[General Appearance - Alert] : alert [General Appearance - In No Acute Distress] : in no acute distress [General Appearance - Well Nourished] : well nourished [General Appearance - Well Developed] : well developed [Outer Ear] : the ears and nose were normal in appearance [Sclera] : the sclera and conjunctiva were normal [Hearing Threshold Finger Rub Not [place]] : hearing was normal [Both Tympanic Membranes Were Examined] : both tympanic membranes were normal [Neck Appearance] : the appearance of the neck was normal [Neck Cervical Mass (___cm)] : no neck mass was observed [] : the neck was supple [Auscultation Breath Sounds / Voice Sounds] : lungs were clear to auscultation bilaterally [Respiration, Rhythm And Depth] : normal respiratory rhythm and effort [Arterial Pulses Carotid] : carotid pulses were normal with no bruits [Heart Sounds] : normal S1 and S2 [Heart Rate And Rhythm] : heart rate was normal and rhythm regular [Bowel Sounds] : normal bowel sounds [Abdomen Soft] : soft [Abdomen Tenderness] : non-tender [No CVA Tenderness] : no ~M costovertebral angle tenderness [Abnormal Walk] : normal gait [Skin Color & Pigmentation] : normal skin color and pigmentation [Oriented To Time, Place, And Person] : oriented to person, place, and time [Cranial Nerves] : cranial nerves 2-12 were intact

## 2019-05-20 ENCOUNTER — RX RENEWAL (OUTPATIENT)
Age: 84
End: 2019-05-20

## 2019-05-28 ENCOUNTER — TRANSCRIPTION ENCOUNTER (OUTPATIENT)
Age: 84
End: 2019-05-28

## 2019-06-07 ENCOUNTER — RX RENEWAL (OUTPATIENT)
Age: 84
End: 2019-06-07

## 2019-07-08 ENCOUNTER — RX RENEWAL (OUTPATIENT)
Age: 84
End: 2019-07-08

## 2019-07-08 ENCOUNTER — APPOINTMENT (OUTPATIENT)
Dept: INTERNAL MEDICINE | Facility: CLINIC | Age: 84
End: 2019-07-08
Payer: MEDICARE

## 2019-07-08 VITALS
DIASTOLIC BLOOD PRESSURE: 80 MMHG | TEMPERATURE: 97.6 F | BODY MASS INDEX: 34.66 KG/M2 | HEIGHT: 64 IN | WEIGHT: 203 LBS | HEART RATE: 54 BPM | RESPIRATION RATE: 16 BRPM | OXYGEN SATURATION: 96 % | SYSTOLIC BLOOD PRESSURE: 132 MMHG

## 2019-07-08 PROCEDURE — 99214 OFFICE O/P EST MOD 30 MIN: CPT | Mod: 25

## 2019-07-08 PROCEDURE — 36415 COLL VENOUS BLD VENIPUNCTURE: CPT

## 2019-07-08 RX ORDER — CLOTRIMAZOLE AND BETAMETHASONE DIPROPIONATE 10; .5 MG/G; MG/G
1-0.05 CREAM TOPICAL TWICE DAILY
Qty: 1 | Refills: 0 | Status: DISCONTINUED | COMMUNITY
Start: 2019-04-18 | End: 2019-07-08

## 2019-07-08 NOTE — HISTORY OF PRESENT ILLNESS
[FreeTextEntry8] : CC: LE edema\par \par LE edema: Patient complaining of LE edema b/l that started more than one month ago. He states it occurred gradually and feels it more around knees and thighs.  Has been taking lasix 40mg in total daily and urination is at baseline.  He does endorse high salt intake. +weight gain\par Last ECHO completed in February. \par Denies orthopnea or KING. \par \par HTN: Controlled; denies CP or dizziness\par Last change with medication by renal, stopped ARB and increased norvasc to 10mg in May\par \par Afib: on AC; last saw cardiology 2/19, will be making appt soon \par \par PVD: s/p stent in RLE\par  on ASA/AC, denies pain in legs\par \par CKD: Worsening-last Cr increased to 2.72\par Follows w/ renal, has appt pending April 17\par

## 2019-07-08 NOTE — PHYSICAL EXAM
[No Acute Distress] : no acute distress [No Respiratory Distress] : no respiratory distress  [No Accessory Muscle Use] : no accessory muscle use [Clear to Auscultation] : lungs were clear to auscultation bilaterally [Normal S1, S2] : normal S1 and S2 [de-identified] : Bradycardiac [de-identified] : +1 edema b/l  [de-identified] : +erythema of RLE-chronic per patient

## 2019-07-08 NOTE — REVIEW OF SYSTEMS
[Lower Ext Edema] : lower extremity edema [Skin Rash] : skin rash [Fever] : no fever [Chills] : no chills [Chest Pain] : no chest pain [Palpitations] : no palpitations [Claudication] : no  leg claudication [Orthopena] : no orthopnea [Shortness Of Breath] : no shortness of breath [Cough] : no cough

## 2019-07-09 LAB
ALBUMIN SERPL ELPH-MCNC: 4 G/DL
ALP BLD-CCNC: 288 U/L
ALT SERPL-CCNC: 113 U/L
ANION GAP SERPL CALC-SCNC: 15 MMOL/L
AST SERPL-CCNC: 60 U/L
BILIRUB SERPL-MCNC: 0.6 MG/DL
BUN SERPL-MCNC: 46 MG/DL
CALCIUM SERPL-MCNC: 9.3 MG/DL
CHLORIDE SERPL-SCNC: 106 MMOL/L
CO2 SERPL-SCNC: 21 MMOL/L
CREAT SERPL-MCNC: 2.32 MG/DL
GLUCOSE SERPL-MCNC: 141 MG/DL
POTASSIUM SERPL-SCNC: 4.3 MMOL/L
PROT SERPL-MCNC: 6.2 G/DL
SODIUM SERPL-SCNC: 142 MMOL/L

## 2019-07-12 ENCOUNTER — APPOINTMENT (OUTPATIENT)
Dept: INTERNAL MEDICINE | Facility: CLINIC | Age: 84
End: 2019-07-12
Payer: MEDICARE

## 2019-07-12 VITALS
SYSTOLIC BLOOD PRESSURE: 126 MMHG | RESPIRATION RATE: 16 BRPM | DIASTOLIC BLOOD PRESSURE: 52 MMHG | BODY MASS INDEX: 34.66 KG/M2 | HEIGHT: 64 IN | OXYGEN SATURATION: 97 % | WEIGHT: 203 LBS | HEART RATE: 76 BPM

## 2019-07-12 DIAGNOSIS — R74.0 NONSPECIFIC ELEVATION OF LEVELS OF TRANSAMINASE AND LACTIC ACID DEHYDROGENASE [LDH]: ICD-10-CM

## 2019-07-12 PROCEDURE — 36415 COLL VENOUS BLD VENIPUNCTURE: CPT

## 2019-07-12 PROCEDURE — 99214 OFFICE O/P EST MOD 30 MIN: CPT | Mod: 25

## 2019-07-12 NOTE — REVIEW OF SYSTEMS
[Lower Ext Edema] : lower extremity edema [Fever] : no fever [Chills] : no chills [Chest Pain] : no chest pain [Palpitations] : no palpitations [Orthopena] : no orthopnea [Shortness Of Breath] : no shortness of breath [Dizziness] : no dizziness

## 2019-07-12 NOTE — HISTORY OF PRESENT ILLNESS
[FreeTextEntry1] : CC: Follow up on chronic conditions, bilateral leg edema [de-identified] : 1. Bilateral leg edema:  Had increased his lasix due to bilateral leg edema. States tolerating it well, BP stable and denies dizziness.\par Has seen significant improvement in edema in thighs and feels his pants are now fitting looser.  Poor compliance w/ low sodium intake as per daughter in law Cindy, eats out dinner every night including [employer].  Also w/ daily alcohol intake.\par +weight loss since his last visit\par \par 2. Afib: on AC, denies palpitations but has not scheduled appt w/ cardiology as advised\par Last ECHO w/ hyperdynamic systolic function, indeterminate diastolic function\par \par 3. HTN: Controlled; denies dizziness/CP\par \par 4. CKD: Cr stable, will repeat given increase in lasix\par Has appt pending w/ renal next week\par \par 5. Transaminitis: New onset, hx of heavy alcohol use in the past with hard liquor (vodka)\par States now only drinking beer & wine, but does do it daily\par \par \par

## 2019-07-12 NOTE — PHYSICAL EXAM
[No Acute Distress] : no acute distress [No JVD] : no jugular venous distention [No Respiratory Distress] : no respiratory distress  [No Accessory Muscle Use] : no accessory muscle use [Clear to Auscultation] : lungs were clear to auscultation bilaterally [Normal S1, S2] : normal S1 and S2 [Irregularly Irregular] : irregularly irregular [Normal] : normal [Soft, Nontender] : the abdomen was soft and nontender [Liver Enlarged] : enlarged [de-identified] : - [de-identified] : S

## 2019-07-16 LAB
ALBUMIN SERPL ELPH-MCNC: 4 G/DL
ALP BLD-CCNC: 236 U/L
ALT SERPL-CCNC: 51 U/L
ANION GAP SERPL CALC-SCNC: 13 MMOL/L
AST SERPL-CCNC: 29 U/L
BILIRUB SERPL-MCNC: 0.6 MG/DL
BUN SERPL-MCNC: 45 MG/DL
CALCIUM SERPL-MCNC: 9.3 MG/DL
CHLORIDE SERPL-SCNC: 103 MMOL/L
CO2 SERPL-SCNC: 24 MMOL/L
CREAT SERPL-MCNC: 2.23 MG/DL
GLUCOSE SERPL-MCNC: 132 MG/DL
POTASSIUM SERPL-SCNC: 4.2 MMOL/L
PROT SERPL-MCNC: 6 G/DL
SODIUM SERPL-SCNC: 140 MMOL/L

## 2019-07-17 ENCOUNTER — APPOINTMENT (OUTPATIENT)
Dept: NEPHROLOGY | Facility: CLINIC | Age: 84
End: 2019-07-17
Payer: MEDICARE

## 2019-07-17 VITALS
BODY MASS INDEX: 33.29 KG/M2 | HEIGHT: 64 IN | DIASTOLIC BLOOD PRESSURE: 60 MMHG | WEIGHT: 195 LBS | SYSTOLIC BLOOD PRESSURE: 110 MMHG

## 2019-07-17 PROCEDURE — 99215 OFFICE O/P EST HI 40 MIN: CPT

## 2019-07-17 NOTE — PHYSICAL EXAM
[General Appearance - Alert] : alert [General Appearance - In No Acute Distress] : in no acute distress [General Appearance - Well Nourished] : well nourished [General Appearance - Well Developed] : well developed [Sclera] : the sclera and conjunctiva were normal [Outer Ear] : the ears and nose were normal in appearance [Hearing Threshold Finger Rub Not [place]] : hearing was normal [Both Tympanic Membranes Were Examined] : both tympanic membranes were normal [Neck Appearance] : the appearance of the neck was normal [Neck Cervical Mass (___cm)] : no neck mass was observed [] : no respiratory distress [Respiration, Rhythm And Depth] : normal respiratory rhythm and effort [Auscultation Breath Sounds / Voice Sounds] : lungs were clear to auscultation bilaterally [Heart Rate And Rhythm] : heart rate was normal and rhythm regular [Heart Sounds] : normal S1 and S2 [Arterial Pulses Carotid] : carotid pulses were normal with no bruits [Bowel Sounds] : normal bowel sounds [Abdomen Soft] : soft [Abdomen Tenderness] : non-tender [No CVA Tenderness] : no ~M costovertebral angle tenderness [Abnormal Walk] : normal gait [Skin Color & Pigmentation] : normal skin color and pigmentation [Cranial Nerves] : cranial nerves 2-12 were intact [Oriented To Time, Place, And Person] : oriented to person, place, and time

## 2019-07-17 NOTE — ASSESSMENT
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

## 2019-07-17 NOTE — HISTORY OF PRESENT ILLNESS
[FreeTextEntry1] : Patient is an 87 year old male with past medical history of BPH, HTN, Gout, Pre-Diabetic, benign R sided renal mass s/p partial nephrectomy in 2006, here for initial evaluation of elevated SCr. Patient is followed by Dr. Suggs. Patient seen and examined; is in no distress; accompanied by his daughter; able to provide full history; feels well; appears well. Patient states he drank quite a bit of alcohol in the past; now drinks a glass of wine or beer with dinner. Found to have Cr of 2.13 with some mild anemia. Pt seen and examined today for follow up. Has been feeling very well; drinking wine daily and enjoying life. Good energy, good appetite; lives with daughter. Pt seen and examined; no complaints; feels well; last GFR 30 Pt seen/examined; doing well; Cr slightly up; 2.72 last month. Meds reviewed.\par \par Current: Pt seen/examined; no complaints; doing well; labs reviewed from last week.

## 2019-07-18 ENCOUNTER — APPOINTMENT (OUTPATIENT)
Dept: ULTRASOUND IMAGING | Facility: CLINIC | Age: 84
End: 2019-07-18
Payer: MEDICARE

## 2019-07-18 ENCOUNTER — OUTPATIENT (OUTPATIENT)
Dept: OUTPATIENT SERVICES | Facility: HOSPITAL | Age: 84
LOS: 1 days | End: 2019-07-18
Payer: MEDICARE

## 2019-07-18 DIAGNOSIS — I48.91 UNSPECIFIED ATRIAL FIBRILLATION: ICD-10-CM

## 2019-07-18 DIAGNOSIS — N18.9 CHRONIC KIDNEY DISEASE, UNSPECIFIED: ICD-10-CM

## 2019-07-18 DIAGNOSIS — R60.0 LOCALIZED EDEMA: ICD-10-CM

## 2019-07-18 PROCEDURE — 76705 ECHO EXAM OF ABDOMEN: CPT

## 2019-07-18 PROCEDURE — 76705 ECHO EXAM OF ABDOMEN: CPT | Mod: 26

## 2019-07-22 ENCOUNTER — RESULT REVIEW (OUTPATIENT)
Age: 84
End: 2019-07-22

## 2019-07-30 ENCOUNTER — APPOINTMENT (OUTPATIENT)
Dept: CARDIOLOGY | Facility: CLINIC | Age: 84
End: 2019-07-30
Payer: MEDICARE

## 2019-07-30 ENCOUNTER — NON-APPOINTMENT (OUTPATIENT)
Age: 84
End: 2019-07-30

## 2019-07-30 VITALS
OXYGEN SATURATION: 98 % | WEIGHT: 189 LBS | HEIGHT: 64 IN | SYSTOLIC BLOOD PRESSURE: 135 MMHG | BODY MASS INDEX: 32.27 KG/M2 | HEART RATE: 62 BPM | DIASTOLIC BLOOD PRESSURE: 55 MMHG

## 2019-07-30 PROCEDURE — 99214 OFFICE O/P EST MOD 30 MIN: CPT | Mod: 25

## 2019-07-30 PROCEDURE — 93000 ELECTROCARDIOGRAM COMPLETE: CPT

## 2019-07-30 NOTE — DISCUSSION/SUMMARY
[FreeTextEntry1] : Pt is an 89 y/o M with PMH HTN, HLD, PVD, afib on Eliquis, CKD who presents today for f/u.  \par TTE 02/2018 shows EF = 60-65% without significant valvular disease\par TTE 02/2019 EF 77% mild-mod MR/TR, mild AI, mild-mod LVH\par Nuclear stress test 02/2019 normal myocardial perfusion\par LE edema improving - c/w lasix 40mg qd, advised consistent use of compression stockings, low salt diet, regular exercise\par afib: HR well controlled.  He denies any bleeding issues on Eliquis.  CHADSVasc = 5\par HTN: well controlled, c/w norvasc, losartan, lasix\par PVD: discuss statin at next OV although LDL at 79, c/w ASA\par He will f/u in 6 mnths\par The described plan was discussed with the pt.  All questions and concerns were addressed to the best of my knowledge.\par

## 2019-07-30 NOTE — PHYSICAL EXAM
[General Appearance - Well Developed] : well developed [Normal Appearance] : normal appearance [Well Groomed] : well groomed [General Appearance - Well Nourished] : well nourished [No Deformities] : no deformities [General Appearance - In No Acute Distress] : no acute distress [Normal Conjunctiva] : the conjunctiva exhibited no abnormalities [Eyelids - No Xanthelasma] : the eyelids demonstrated no xanthelasmas [Normal Oral Mucosa] : normal oral mucosa [No Oral Pallor] : no oral pallor [No Oral Cyanosis] : no oral cyanosis [Respiration, Rhythm And Depth] : normal respiratory rhythm and effort [Exaggerated Use Of Accessory Muscles For Inspiration] : no accessory muscle use [Auscultation Breath Sounds / Voice Sounds] : lungs were clear to auscultation bilaterally [Heart Rate And Rhythm] : heart rate and rhythm were normal [Heart Sounds] : normal S1 and S2 [Murmurs] : no murmurs present [Arterial Pulses Normal] : the arterial pulses were normal [Abdomen Soft] : soft [Abdomen Tenderness] : non-tender [Abdomen Mass (___ Cm)] : no abdominal mass palpated [Abnormal Walk] : normal gait [Gait - Sufficient For Exercise Testing] : the gait was sufficient for exercise testing [Nail Clubbing] : no clubbing of the fingernails [Cyanosis, Localized] : no localized cyanosis [Petechial Hemorrhages (___cm)] : no petechial hemorrhages [] : no ischemic changes [Oriented To Time, Place, And Person] : oriented to person, place, and time [Impaired Insight] : insight and judgment were intact [Affect] : the affect was normal [Mood] : the mood was normal [No Anxiety] : not feeling anxious [FreeTextEntry1] : b/l LE edema

## 2019-07-30 NOTE — HISTORY OF PRESENT ILLNESS
[FreeTextEntry1] : Pt is a 89 y/o M who presents today for f/u. PMH afib on Eliquis, CKD, HTN, PVD, HLD.  He states that he recently had LE edema and diuretics were increased which helped - states he lost 8lbs of water weight.  Now taking lasix 40 mg qd.  He is overall feeling better.  Pt denies CP, SOB, diaphoresis, palpitations, dizziness, syncope, PND.  Sometimes uses compression stockings\par He is compliant with medications.  Denies any major bleeding problems\par TTE 02/2019 EF 77% mild-mod MR/TR, mild AI, mild-mod LVH\par Nuclear stress test 02/2019 normal myocardial perfusion\par \par Tchol 153\par HDL 66\par LDL 79\par PMH: PVD s/p peripheral stent RLE with Dr Singh, afib on Eliquis, ambulates with cane, HLD, HTN, colon cancer\par Smoking status: quit in 1963\par Current exercise: none\par Daily water intake: 50 oz\par Daily caffeine intake: 1-2 cups coffee\par OTC medications: ASA\par Family hx: pt denies any immediate family history cardiac disease\par Previous cardiac testing: unsure\par Previous hospitalizations: gout/cellulitis\par surgeries: colon cancer, partial nephrectomy 2006 benign mass\par

## 2019-07-30 NOTE — REVIEW OF SYSTEMS
[Negative] : Heme/Lymph [see HPI] : see HPI [Chest  Pressure] : no chest pressure [Dyspnea on exertion] : not dyspnea during exertion [Shortness Of Breath] : no shortness of breath [Chest Pain] : no chest pain [Lower Ext Edema] : lower extremity edema [Leg Claudication] : no intermittent leg claudication [Palpitations] : no palpitations

## 2019-07-31 ENCOUNTER — APPOINTMENT (OUTPATIENT)
Dept: FAMILY MEDICINE | Facility: CLINIC | Age: 84
End: 2019-07-31
Payer: MEDICARE

## 2019-07-31 VITALS
WEIGHT: 193 LBS | OXYGEN SATURATION: 98 % | SYSTOLIC BLOOD PRESSURE: 122 MMHG | HEART RATE: 58 BPM | BODY MASS INDEX: 32.95 KG/M2 | DIASTOLIC BLOOD PRESSURE: 70 MMHG | RESPIRATION RATE: 16 BRPM | HEIGHT: 64 IN

## 2019-07-31 PROCEDURE — 99214 OFFICE O/P EST MOD 30 MIN: CPT

## 2019-07-31 NOTE — PHYSICAL EXAM
[General Appearance - Alert] : alert [General Appearance - Well Developed] : well developed [General Appearance - In No Acute Distress] : in no acute distress [General Appearance - Well Nourished] : well nourished [Sclera] : the sclera and conjunctiva were normal [Outer Ear] : the ears and nose were normal in appearance [Hearing Threshold Finger Rub Not [place]] : hearing was normal [Both Tympanic Membranes Were Examined] : both tympanic membranes were normal [Neck Appearance] : the appearance of the neck was normal [Neck Cervical Mass (___cm)] : no neck mass was observed [] : no respiratory distress [Respiration, Rhythm And Depth] : normal respiratory rhythm and effort [Auscultation Breath Sounds / Voice Sounds] : lungs were clear to auscultation bilaterally [Heart Rate And Rhythm] : heart rate was normal and rhythm regular [Heart Sounds] : normal S1 and S2 [Arterial Pulses Carotid] : carotid pulses were normal with no bruits [Bowel Sounds] : normal bowel sounds [Abdomen Soft] : soft [Abdomen Tenderness] : non-tender [No CVA Tenderness] : no ~M costovertebral angle tenderness [Abnormal Walk] : normal gait [Skin Color & Pigmentation] : normal skin color and pigmentation [Cranial Nerves] : cranial nerves 2-12 were intact [Oriented To Time, Place, And Person] : oriented to person, place, and time

## 2019-07-31 NOTE — ASSESSMENT
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

## 2019-07-31 NOTE — HISTORY OF PRESENT ILLNESS
[FreeTextEntry1] : follow up [de-identified] : 88 yr old male is here for follow up\par weight stable on Lasix 40 mg. reports weight yesterday was 192, recorded as 189.\par Hx of Afib on Eliquis\par Anemia: iron deficiency, recent labs 4/19, Hb 11.7\par CKD last creatinine  2 weeks ago. Hx of partial nephrectomy for benign tumor. Seen by Nephrologist 2 weeks ago.\par Does not want dialysis if it gets worse\par

## 2019-09-23 ENCOUNTER — APPOINTMENT (OUTPATIENT)
Dept: FAMILY MEDICINE | Facility: CLINIC | Age: 84
End: 2019-09-23
Payer: MEDICARE

## 2019-09-23 VITALS
HEIGHT: 64 IN | DIASTOLIC BLOOD PRESSURE: 78 MMHG | SYSTOLIC BLOOD PRESSURE: 140 MMHG | BODY MASS INDEX: 32.95 KG/M2 | RESPIRATION RATE: 16 BRPM | OXYGEN SATURATION: 98 % | WEIGHT: 193 LBS | TEMPERATURE: 98.9 F | HEART RATE: 72 BPM

## 2019-09-23 DIAGNOSIS — Z23 ENCOUNTER FOR IMMUNIZATION: ICD-10-CM

## 2019-09-23 PROCEDURE — 90662 IIV NO PRSV INCREASED AG IM: CPT

## 2019-09-23 PROCEDURE — G0008: CPT

## 2019-09-23 PROCEDURE — 36415 COLL VENOUS BLD VENIPUNCTURE: CPT

## 2019-09-23 PROCEDURE — 99214 OFFICE O/P EST MOD 30 MIN: CPT | Mod: 25

## 2019-09-23 NOTE — HISTORY OF PRESENT ILLNESS
[FreeTextEntry1] : follow up [de-identified] : 88 yr old male is here for follow up\par Hx of Afib on Eliquis\par Anemia: iron deficiency, recent labs 4/19, Hb 11.9\par CKD last labs 7/19\par Hx of partial nephrectomy for benign tumor. Seen by Nephrologist 2 weeks ago.\par Seen by Nephrologist 7/19. Has follow up appointment coming up soon.\par Does not want dialysis if it gets worse\par

## 2019-09-23 NOTE — ASSESSMENT
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 amlodipine to 10mg daily; \par  Gout: On Allopurinol.\par Elevated A1C check labs\par Will not want HD if eventually worsens\par  Hx of Anemia, HGB at goal > 10 is currently on iron\par Flu vaccine administered\par

## 2019-09-23 NOTE — PHYSICAL EXAM
[General Appearance - Alert] : alert [General Appearance - Well Nourished] : well nourished [General Appearance - In No Acute Distress] : in no acute distress [Sclera] : the sclera and conjunctiva were normal [General Appearance - Well Developed] : well developed [Outer Ear] : the ears and nose were normal in appearance [Hearing Threshold Finger Rub Not [place]] : hearing was normal [Both Tympanic Membranes Were Examined] : both tympanic membranes were normal [Neck Appearance] : the appearance of the neck was normal [Neck Cervical Mass (___cm)] : no neck mass was observed [Auscultation Breath Sounds / Voice Sounds] : lungs were clear to auscultation bilaterally [Respiration, Rhythm And Depth] : normal respiratory rhythm and effort [] : no respiratory distress [Heart Rate And Rhythm] : heart rate was normal and rhythm regular [Heart Sounds] : normal S1 and S2 [Bowel Sounds] : normal bowel sounds [Arterial Pulses Carotid] : carotid pulses were normal with no bruits [No CVA Tenderness] : no ~M costovertebral angle tenderness [Abdomen Tenderness] : non-tender [Abdomen Soft] : soft [Skin Color & Pigmentation] : normal skin color and pigmentation [Abnormal Walk] : normal gait [Cranial Nerves] : cranial nerves 2-12 were intact [Oriented To Time, Place, And Person] : oriented to person, place, and time

## 2019-09-23 NOTE — REVIEW OF SYSTEMS
[Fever] : no fever [Night Sweats] : no night sweats [Chills] : no chills [Fatigue] : fatigue [Recent Change In Weight] : ~T no recent weight change [Negative] : Heme/Lymph

## 2019-10-01 ENCOUNTER — TRANSCRIPTION ENCOUNTER (OUTPATIENT)
Age: 84
End: 2019-10-01

## 2019-10-01 LAB
ALBUMIN SERPL ELPH-MCNC: 4.6 G/DL
ALP BLD-CCNC: 272 U/L
ALT SERPL-CCNC: 33 U/L
ANION GAP SERPL CALC-SCNC: 14 MMOL/L
AST SERPL-CCNC: 40 U/L
BASOPHILS # BLD AUTO: 0.06 K/UL
BASOPHILS NFR BLD AUTO: 0.7 %
BILIRUB SERPL-MCNC: 0.7 MG/DL
BUN SERPL-MCNC: 32 MG/DL
CALCIUM SERPL-MCNC: 9.6 MG/DL
CHLORIDE SERPL-SCNC: 101 MMOL/L
CHOLEST SERPL-MCNC: 160 MG/DL
CHOLEST/HDLC SERPL: 2.1 RATIO
CO2 SERPL-SCNC: 27 MMOL/L
CREAT SERPL-MCNC: 1.8 MG/DL
EOSINOPHIL # BLD AUTO: 0.09 K/UL
EOSINOPHIL NFR BLD AUTO: 1 %
ESTIMATED AVERAGE GLUCOSE: 120 MG/DL
GLUCOSE SERPL-MCNC: 130 MG/DL
HBA1C MFR BLD HPLC: 5.8 %
HCT VFR BLD CALC: 37.4 %
HDLC SERPL-MCNC: 77 MG/DL
HGB BLD-MCNC: 11.6 G/DL
IMM GRANULOCYTES NFR BLD AUTO: 0.4 %
LDLC SERPL CALC-MCNC: 74 MG/DL
LYMPHOCYTES # BLD AUTO: 0.66 K/UL
LYMPHOCYTES NFR BLD AUTO: 7.3 %
MAN DIFF?: NORMAL
MCHC RBC-ENTMCNC: 30.1 PG
MCHC RBC-ENTMCNC: 31 GM/DL
MCV RBC AUTO: 97.1 FL
MONOCYTES # BLD AUTO: 0.61 K/UL
MONOCYTES NFR BLD AUTO: 6.8 %
NEUTROPHILS # BLD AUTO: 7.56 K/UL
NEUTROPHILS NFR BLD AUTO: 83.8 %
PLATELET # BLD AUTO: 253 K/UL
POTASSIUM SERPL-SCNC: 4.4 MMOL/L
PROT SERPL-MCNC: 7 G/DL
RBC # BLD: 3.85 M/UL
RBC # FLD: 16.6 %
SODIUM SERPL-SCNC: 142 MMOL/L
TRIGL SERPL-MCNC: 45 MG/DL
WBC # FLD AUTO: 9.02 K/UL

## 2019-10-04 ENCOUNTER — FORM ENCOUNTER (OUTPATIENT)
Age: 84
End: 2019-10-04

## 2019-10-05 ENCOUNTER — OUTPATIENT (OUTPATIENT)
Dept: OUTPATIENT SERVICES | Facility: HOSPITAL | Age: 84
LOS: 1 days | End: 2019-10-05
Payer: MEDICARE

## 2019-10-05 ENCOUNTER — APPOINTMENT (OUTPATIENT)
Dept: ULTRASOUND IMAGING | Facility: CLINIC | Age: 84
End: 2019-10-05
Payer: MEDICARE

## 2019-10-05 DIAGNOSIS — R94.5 ABNORMAL RESULTS OF LIVER FUNCTION STUDIES: ICD-10-CM

## 2019-10-05 DIAGNOSIS — R60.0 LOCALIZED EDEMA: ICD-10-CM

## 2019-10-05 PROCEDURE — 76700 US EXAM ABDOM COMPLETE: CPT | Mod: 26

## 2019-10-05 PROCEDURE — 76700 US EXAM ABDOM COMPLETE: CPT

## 2019-10-10 ENCOUNTER — RX RENEWAL (OUTPATIENT)
Age: 84
End: 2019-10-10

## 2019-10-16 ENCOUNTER — APPOINTMENT (OUTPATIENT)
Dept: NEPHROLOGY | Facility: CLINIC | Age: 84
End: 2019-10-16
Payer: MEDICARE

## 2019-10-16 PROCEDURE — 99215 OFFICE O/P EST HI 40 MIN: CPT

## 2019-10-16 NOTE — HISTORY OF PRESENT ILLNESS
[FreeTextEntry1] : Patient is an 87 year old male with past medical history of BPH, HTN, Gout, Pre-Diabetic, benign R sided renal mass s/p partial nephrectomy in 2006, here for initial evaluation of elevated SCr. Patient is followed by Dr. Suggs. Patient seen and examined; is in no distress; accompanied by his daughter; able to provide full history; feels well; appears well. Patient states he drank quite a bit of alcohol in the past; now drinks a glass of wine or beer with dinner. Found to have Cr of 2.13 with some mild anemia. Pt seen and examined today for follow up. Has been feeling very well; drinking wine daily and enjoying life. Good energy, good appetite; lives with daughter. Pt seen and examined; no complaints; feels well; last GFR 30 Pt seen/examined; doing well; Cr slightly up; 2.72 last month. Meds reviewed.\par Pt seen/examined; no complaints; doing well; labs reviewed from last week.\par \par Current: Pt seen/examined ; renal function very stable; reviewed labs with patient from Dr Suggs's office 3 weeks ago; Cr is the best its been; 1.8; feels very well;

## 2019-10-16 NOTE — PHYSICAL EXAM
[General Appearance - Alert] : alert [General Appearance - In No Acute Distress] : in no acute distress [General Appearance - Well Nourished] : well nourished [General Appearance - Well Developed] : well developed [Sclera] : the sclera and conjunctiva were normal [Outer Ear] : the ears and nose were normal in appearance [Hearing Threshold Finger Rub Not [place]] : hearing was normal [Both Tympanic Membranes Were Examined] : both tympanic membranes were normal [Neck Appearance] : the appearance of the neck was normal [Neck Cervical Mass (___cm)] : no neck mass was observed [] : no respiratory distress [Respiration, Rhythm And Depth] : normal respiratory rhythm and effort [Auscultation Breath Sounds / Voice Sounds] : lungs were clear to auscultation bilaterally [Heart Rate And Rhythm] : heart rate was normal and rhythm regular [Heart Sounds] : normal S1 and S2 [Arterial Pulses Carotid] : carotid pulses were normal with no bruits [Bowel Sounds] : normal bowel sounds [Abdomen Soft] : soft [Abdomen Tenderness] : non-tender [No CVA Tenderness] : no ~M costovertebral angle tenderness [Abnormal Walk] : normal gait [Skin Color & Pigmentation] : normal skin color and pigmentation [Cranial Nerves] : cranial nerves 2-12 were intact [Oriented To Time, Place, And Person] : oriented to person, place, and time

## 2019-10-16 NOTE — ASSESSMENT
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

## 2019-10-23 ENCOUNTER — MEDICATION RENEWAL (OUTPATIENT)
Age: 84
End: 2019-10-23

## 2019-11-13 ENCOUNTER — RX RENEWAL (OUTPATIENT)
Age: 84
End: 2019-11-13

## 2020-01-06 ENCOUNTER — APPOINTMENT (OUTPATIENT)
Dept: FAMILY MEDICINE | Facility: CLINIC | Age: 85
End: 2020-01-06
Payer: MEDICARE

## 2020-01-06 VITALS
SYSTOLIC BLOOD PRESSURE: 150 MMHG | WEIGHT: 172 LBS | HEIGHT: 64 IN | BODY MASS INDEX: 29.37 KG/M2 | RESPIRATION RATE: 15 BRPM | HEART RATE: 76 BPM | OXYGEN SATURATION: 98 % | DIASTOLIC BLOOD PRESSURE: 52 MMHG

## 2020-01-06 PROCEDURE — 36415 COLL VENOUS BLD VENIPUNCTURE: CPT

## 2020-01-06 PROCEDURE — 99214 OFFICE O/P EST MOD 30 MIN: CPT | Mod: 25

## 2020-01-06 NOTE — REVIEW OF SYSTEMS
[Fatigue] : fatigue [Negative] : Psychiatric [Fever] : no fever [Chills] : no chills [Recent Change In Weight] : ~T no recent weight change [Night Sweats] : no night sweats

## 2020-01-06 NOTE — PHYSICAL EXAM
[General Appearance - Alert] : alert [General Appearance - In No Acute Distress] : in no acute distress [General Appearance - Well Nourished] : well nourished [General Appearance - Well Developed] : well developed [Sclera] : the sclera and conjunctiva were normal [Outer Ear] : the ears and nose were normal in appearance [Hearing Threshold Finger Rub Not [place]] : hearing was normal [Both Tympanic Membranes Were Examined] : both tympanic membranes were normal [Neck Appearance] : the appearance of the neck was normal [Neck Cervical Mass (___cm)] : no neck mass was observed [Respiration, Rhythm And Depth] : normal respiratory rhythm and effort [] : no respiratory distress [Auscultation Breath Sounds / Voice Sounds] : lungs were clear to auscultation bilaterally [Heart Rate And Rhythm] : heart rate was normal and rhythm regular [Heart Sounds] : normal S1 and S2 [Bowel Sounds] : normal bowel sounds [Arterial Pulses Carotid] : carotid pulses were normal with no bruits [Abdomen Tenderness] : non-tender [Abdomen Soft] : soft [Abnormal Walk] : normal gait [No CVA Tenderness] : no ~M costovertebral angle tenderness [Skin Color & Pigmentation] : normal skin color and pigmentation [Oriented To Time, Place, And Person] : oriented to person, place, and time [Cranial Nerves] : cranial nerves 2-12 were intact [FreeTextEntry1] : skin lesion in the scalp occipital area scabbing

## 2020-01-06 NOTE — ASSESSMENT
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 amlodipine 10mg daily; \par  Gout: On Allopurinol.\par Elevated A1C check labs\par Will not want HD if eventually worsens\par  Hx of Anemia, HGB at goal > 10 is currently on iron\par Weight loss, reports he is not trying. But did not notice the weight loss. On furosemide 2 tabs daily.\par Does not want work up\par Advised to keep food diary.\par Skin Lesion ? Basal cell ca. Refuses dermatology evaluation\par HTN: BP low\par Reduce amlodipine to 5 mg\par Keep log and follow up in 1 month.\par \par

## 2020-01-06 NOTE — HISTORY OF PRESENT ILLNESS
[FreeTextEntry1] : follow up [de-identified] : 88 yr old male is here for follow up\par Hx of Afib on Eliquis\par Anemia: iron deficiency, recent labs 9/19, Hb 11.9\par CKD last labs 9/19\par Hx of partial nephrectomy for benign tumor.  Nephrologist follow up in 2 days.\par Does not want dialysis if it gets worse\par PVD asymptomatic. Seen by cardiologist Dr Marquez 3/19\par Weight loss, reports he is not trying. But did not notice the weight loss. On furosemide 2 tabs daily.\par

## 2020-01-08 ENCOUNTER — APPOINTMENT (OUTPATIENT)
Dept: NEPHROLOGY | Facility: CLINIC | Age: 85
End: 2020-01-08
Payer: MEDICARE

## 2020-01-08 VITALS
TEMPERATURE: 97.8 F | BODY MASS INDEX: 29.37 KG/M2 | RESPIRATION RATE: 16 BRPM | OXYGEN SATURATION: 97 % | HEART RATE: 74 BPM | WEIGHT: 172 LBS | DIASTOLIC BLOOD PRESSURE: 68 MMHG | SYSTOLIC BLOOD PRESSURE: 114 MMHG | HEIGHT: 64 IN

## 2020-01-08 LAB
ALBUMIN SERPL ELPH-MCNC: 4.4 G/DL
ALP BLD-CCNC: 158 U/L
ALT SERPL-CCNC: 19 U/L
ANION GAP SERPL CALC-SCNC: 14 MMOL/L
AST SERPL-CCNC: 25 U/L
BASOPHILS # BLD AUTO: 0.05 K/UL
BASOPHILS NFR BLD AUTO: 0.7 %
BILIRUB SERPL-MCNC: 0.7 MG/DL
BUN SERPL-MCNC: 48 MG/DL
CALCIUM SERPL-MCNC: 10 MG/DL
CHLORIDE SERPL-SCNC: 103 MMOL/L
CO2 SERPL-SCNC: 25 MMOL/L
CREAT SERPL-MCNC: 2.11 MG/DL
EOSINOPHIL # BLD AUTO: 0.2 K/UL
EOSINOPHIL NFR BLD AUTO: 2.6 %
FERRITIN SERPL-MCNC: 89 NG/ML
GLUCOSE SERPL-MCNC: 117 MG/DL
HCT VFR BLD CALC: 36.3 %
HGB BLD-MCNC: 11.5 G/DL
IMM GRANULOCYTES NFR BLD AUTO: 0.3 %
IRON SATN MFR SERPL: 13 %
IRON SERPL-MCNC: 38 UG/DL
LYMPHOCYTES # BLD AUTO: 0.97 K/UL
LYMPHOCYTES NFR BLD AUTO: 12.7 %
MAN DIFF?: NORMAL
MCHC RBC-ENTMCNC: 30.8 PG
MCHC RBC-ENTMCNC: 31.7 GM/DL
MCV RBC AUTO: 97.3 FL
MONOCYTES # BLD AUTO: 0.61 K/UL
MONOCYTES NFR BLD AUTO: 8 %
NEUTROPHILS # BLD AUTO: 5.77 K/UL
NEUTROPHILS NFR BLD AUTO: 75.7 %
PLATELET # BLD AUTO: 231 K/UL
POTASSIUM SERPL-SCNC: 4.2 MMOL/L
PROT SERPL-MCNC: 6.4 G/DL
RBC # BLD: 3.73 M/UL
RBC # FLD: 16 %
SODIUM SERPL-SCNC: 142 MMOL/L
T4 FREE SERPL-MCNC: 1.1 NG/DL
TIBC SERPL-MCNC: 291 UG/DL
TSH SERPL-ACNC: 2.06 UIU/ML
UIBC SERPL-MCNC: 253 UG/DL
WBC # FLD AUTO: 7.62 K/UL

## 2020-01-08 PROCEDURE — 99214 OFFICE O/P EST MOD 30 MIN: CPT

## 2020-01-08 NOTE — REVIEW OF SYSTEMS
[Negative] : Neurological [Muscle Weakness] : no muscle weakness [Easy Bleeding] : no tendency for easy bleeding [Easy Bruising] : no tendency for easy bruising

## 2020-01-08 NOTE — ASSESSMENT
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

## 2020-01-08 NOTE — HISTORY OF PRESENT ILLNESS
[FreeTextEntry1] : Patient is an 89 year old male with past medical history of BPH, HTN, Gout, Pre-Diabetic, benign R sided renal mass s/p partial nephrectomy in 2006, here for initial evaluation of elevated SCr. Patient is followed by Dr. Suggs. Patient seen and examined; is in no distress; accompanied by his daughter; able to provide full history; feels well; appears well. Patient states he drank quite a bit of alcohol in the past; now drinks a glass of wine or beer with dinner. Found to have Cr of 2.13 with some mild anemia. Pt seen and examined today for follow up. Has been feeling very well; drinking wine daily and enjoying life. Good energy, good appetite; lives with daughter. Pt seen and examined; no complaints; feels well; last GFR 30 Pt seen/examined; doing well; Cr slightly up; 2.72 last month. Meds reviewed.\par Pt seen/examined; no complaints; doing well; labs reviewed from last week.\par Current: Pt seen/examined ; renal function very stable; reviewed labs with patient from Dr Suggs's office 3 weeks ago; Cr is the best its been; 1.8; feels very well;\par -------------------------------------------------------------------------------------------------------------\par 01/08/2020\par Today, \par Pt presents for follow up; feels good. No new complaint.\par No interval illness/ hospitalization\par Recently saw PCP; BP low; was instructed to cut Norvasc to half.\par \par \par \par

## 2020-01-08 NOTE — PHYSICAL EXAM
[General Appearance - Alert] : alert [General Appearance - In No Acute Distress] : in no acute distress [General Appearance - Well Nourished] : well nourished [General Appearance - Well Developed] : well developed [Sclera] : the sclera and conjunctiva were normal [Outer Ear] : the ears and nose were normal in appearance [Hearing Threshold Finger Rub Not [place]] : hearing was normal [Both Tympanic Membranes Were Examined] : both tympanic membranes were normal [Neck Appearance] : the appearance of the neck was normal [] : no respiratory distress [Neck Cervical Mass (___cm)] : no neck mass was observed [Respiration, Rhythm And Depth] : normal respiratory rhythm and effort [Heart Rate And Rhythm] : heart rate was normal and rhythm regular [Auscultation Breath Sounds / Voice Sounds] : lungs were clear to auscultation bilaterally [Bowel Sounds] : normal bowel sounds [Heart Sounds] : normal S1 and S2 [No CVA Tenderness] : no ~M costovertebral angle tenderness [Abdomen Soft] : soft [Abdomen Tenderness] : non-tender [Abnormal Walk] : normal gait [Skin Color & Pigmentation] : normal skin color and pigmentation [Cranial Nerves] : cranial nerves 2-12 were intact [Oriented To Time, Place, And Person] : oriented to person, place, and time [FreeTextEntry1] : pleasant man [Strabismus] : no strabismus was seen [Edema] : there was no peripheral edema

## 2020-02-04 ENCOUNTER — INPATIENT (INPATIENT)
Facility: HOSPITAL | Age: 85
LOS: 2 days | Discharge: HOME | DRG: 242 | End: 2020-02-07
Attending: HOSPITALIST | Admitting: HOSPITALIST
Payer: MEDICARE

## 2020-02-04 ENCOUNTER — APPOINTMENT (OUTPATIENT)
Dept: CARDIOLOGY | Facility: CLINIC | Age: 85
End: 2020-02-04
Payer: MEDICARE

## 2020-02-04 ENCOUNTER — NON-APPOINTMENT (OUTPATIENT)
Age: 85
End: 2020-02-04

## 2020-02-04 VITALS
DIASTOLIC BLOOD PRESSURE: 60 MMHG | WEIGHT: 174 LBS | BODY MASS INDEX: 29.71 KG/M2 | SYSTOLIC BLOOD PRESSURE: 148 MMHG | HEIGHT: 64 IN

## 2020-02-04 VITALS
RESPIRATION RATE: 17 BRPM | DIASTOLIC BLOOD PRESSURE: 48 MMHG | HEART RATE: 39 BPM | OXYGEN SATURATION: 99 % | SYSTOLIC BLOOD PRESSURE: 131 MMHG | WEIGHT: 184.97 LBS | TEMPERATURE: 98 F | HEIGHT: 65 IN

## 2020-02-04 DIAGNOSIS — I10 ESSENTIAL (PRIMARY) HYPERTENSION: ICD-10-CM

## 2020-02-04 DIAGNOSIS — N18.4 CHRONIC KIDNEY DISEASE, STAGE 4 (SEVERE): ICD-10-CM

## 2020-02-04 DIAGNOSIS — Z29.9 ENCOUNTER FOR PROPHYLACTIC MEASURES, UNSPECIFIED: ICD-10-CM

## 2020-02-04 DIAGNOSIS — I44.1 ATRIOVENTRICULAR BLOCK, SECOND DEGREE: ICD-10-CM

## 2020-02-04 DIAGNOSIS — I44.2 ATRIOVENTRICULAR BLOCK, COMPLETE: ICD-10-CM

## 2020-02-04 DIAGNOSIS — I73.9 PERIPHERAL VASCULAR DISEASE, UNSPECIFIED: ICD-10-CM

## 2020-02-04 DIAGNOSIS — E11.9 TYPE 2 DIABETES MELLITUS WITHOUT COMPLICATIONS: ICD-10-CM

## 2020-02-04 DIAGNOSIS — N40.0 BENIGN PROSTATIC HYPERPLASIA WITHOUT LOWER URINARY TRACT SYMPTOMS: ICD-10-CM

## 2020-02-04 LAB
ALBUMIN SERPL ELPH-MCNC: 3.7 G/DL — SIGNIFICANT CHANGE UP (ref 3.3–5)
ALP SERPL-CCNC: 173 U/L — HIGH (ref 40–120)
ALT FLD-CCNC: 26 U/L — SIGNIFICANT CHANGE UP (ref 12–78)
ANION GAP SERPL CALC-SCNC: 6 MMOL/L — SIGNIFICANT CHANGE UP (ref 5–17)
AST SERPL-CCNC: 23 U/L — SIGNIFICANT CHANGE UP (ref 15–37)
BILIRUB SERPL-MCNC: 0.4 MG/DL — SIGNIFICANT CHANGE UP (ref 0.2–1.2)
BUN SERPL-MCNC: 45 MG/DL — HIGH (ref 7–23)
CALCIUM SERPL-MCNC: 8.8 MG/DL — SIGNIFICANT CHANGE UP (ref 8.5–10.1)
CHLORIDE SERPL-SCNC: 109 MMOL/L — HIGH (ref 96–108)
CO2 SERPL-SCNC: 26 MMOL/L — SIGNIFICANT CHANGE UP (ref 22–31)
CREAT SERPL-MCNC: 1.98 MG/DL — HIGH (ref 0.5–1.3)
GLUCOSE SERPL-MCNC: 74 MG/DL — SIGNIFICANT CHANGE UP (ref 70–99)
HCT VFR BLD CALC: 36.7 % — LOW (ref 39–50)
HGB BLD-MCNC: 12.1 G/DL — LOW (ref 13–17)
MAGNESIUM SERPL-MCNC: 2.2 MG/DL — SIGNIFICANT CHANGE UP (ref 1.6–2.6)
MCHC RBC-ENTMCNC: 31.7 PG — SIGNIFICANT CHANGE UP (ref 27–34)
MCHC RBC-ENTMCNC: 33 GM/DL — SIGNIFICANT CHANGE UP (ref 32–36)
MCV RBC AUTO: 96.1 FL — SIGNIFICANT CHANGE UP (ref 80–100)
NT-PROBNP SERPL-SCNC: 1169 PG/ML — HIGH (ref 0–450)
PLATELET # BLD AUTO: 205 K/UL — SIGNIFICANT CHANGE UP (ref 150–400)
POTASSIUM SERPL-MCNC: 3.7 MMOL/L — SIGNIFICANT CHANGE UP (ref 3.5–5.3)
POTASSIUM SERPL-SCNC: 3.7 MMOL/L — SIGNIFICANT CHANGE UP (ref 3.5–5.3)
PROT SERPL-MCNC: 6.9 GM/DL — SIGNIFICANT CHANGE UP (ref 6–8.3)
RBC # BLD: 3.82 M/UL — LOW (ref 4.2–5.8)
RBC # FLD: 15 % — HIGH (ref 10.3–14.5)
SODIUM SERPL-SCNC: 141 MMOL/L — SIGNIFICANT CHANGE UP (ref 135–145)
TROPONIN I SERPL-MCNC: 0.02 NG/ML — SIGNIFICANT CHANGE UP (ref 0.01–0.04)
WBC # BLD: 5.8 K/UL — SIGNIFICANT CHANGE UP (ref 3.8–10.5)
WBC # FLD AUTO: 5.8 K/UL — SIGNIFICANT CHANGE UP (ref 3.8–10.5)

## 2020-02-04 PROCEDURE — C1894: CPT

## 2020-02-04 PROCEDURE — 33208 INSRT HEART PM ATRIAL & VENT: CPT | Mod: KX

## 2020-02-04 PROCEDURE — 99223 1ST HOSP IP/OBS HIGH 75: CPT | Mod: GC

## 2020-02-04 PROCEDURE — 83036 HEMOGLOBIN GLYCOSYLATED A1C: CPT

## 2020-02-04 PROCEDURE — 93005 ELECTROCARDIOGRAM TRACING: CPT

## 2020-02-04 PROCEDURE — 80048 BASIC METABOLIC PNL TOTAL CA: CPT

## 2020-02-04 PROCEDURE — 85027 COMPLETE CBC AUTOMATED: CPT

## 2020-02-04 PROCEDURE — 85025 COMPLETE CBC W/AUTO DIFF WBC: CPT

## 2020-02-04 PROCEDURE — C1898: CPT

## 2020-02-04 PROCEDURE — 93000 ELECTROCARDIOGRAM COMPLETE: CPT

## 2020-02-04 PROCEDURE — 93306 TTE W/DOPPLER COMPLETE: CPT

## 2020-02-04 PROCEDURE — 80053 COMPREHEN METABOLIC PANEL: CPT

## 2020-02-04 PROCEDURE — 71046 X-RAY EXAM CHEST 2 VIEWS: CPT | Mod: 26

## 2020-02-04 PROCEDURE — 84443 ASSAY THYROID STIM HORMONE: CPT

## 2020-02-04 PROCEDURE — 99215 OFFICE O/P EST HI 40 MIN: CPT | Mod: 25

## 2020-02-04 PROCEDURE — C1785: CPT

## 2020-02-04 PROCEDURE — 36415 COLL VENOUS BLD VENIPUNCTURE: CPT

## 2020-02-04 PROCEDURE — 84484 ASSAY OF TROPONIN QUANT: CPT

## 2020-02-04 PROCEDURE — 80061 LIPID PANEL: CPT

## 2020-02-04 PROCEDURE — 71045 X-RAY EXAM CHEST 1 VIEW: CPT

## 2020-02-04 PROCEDURE — 93010 ELECTROCARDIOGRAM REPORT: CPT

## 2020-02-04 RX ORDER — HEPARIN SODIUM 5000 [USP'U]/ML
5000 INJECTION INTRAVENOUS; SUBCUTANEOUS EVERY 8 HOURS
Refills: 0 | Status: DISCONTINUED | OUTPATIENT
Start: 2020-02-04 | End: 2020-02-05

## 2020-02-04 RX ORDER — SENNA PLUS 8.6 MG/1
2 TABLET ORAL AT BEDTIME
Refills: 0 | Status: DISCONTINUED | OUTPATIENT
Start: 2020-02-04 | End: 2020-02-07

## 2020-02-04 RX ORDER — ONDANSETRON 8 MG/1
4 TABLET, FILM COATED ORAL EVERY 6 HOURS
Refills: 0 | Status: DISCONTINUED | OUTPATIENT
Start: 2020-02-04 | End: 2020-02-07

## 2020-02-04 RX ORDER — ASPIRIN/CALCIUM CARB/MAGNESIUM 324 MG
81 TABLET ORAL DAILY
Refills: 0 | Status: DISCONTINUED | OUTPATIENT
Start: 2020-02-04 | End: 2020-02-07

## 2020-02-04 NOTE — ED ADULT NURSE NOTE - OBJECTIVE STATEMENT
PT to ED BIBA from home for suspected heart block and bradycardia. pt went to PMD today and MD noted pt had HR @39 BPM. PT refused to go to hospital, therefore PMD gave pt a cardiac monitor to go home with. As per daughter in law, phone called received that pt had a heart block and needed to go to ED. PT upon arrival to ED , A&OX4, Calm and cooperative. Denies chest pain, sob,& dizziness. No complaints noted. PT asymptomatic. PT arrived w/ 20g Left AC IV in place. 2nd IV placed in Left forearm, 20g. PT on cardiac monitor and EKG completed. PT noted to have third degree heart block, w/ HR fluctuating btw 30-50 bpm. PT placed on Zolls and remains on cardiac monitor. Will continue to monitor and reassess pt.

## 2020-02-04 NOTE — H&P ADULT - NSHPPHYSICALEXAM_GEN_ALL_CORE
Vital Signs Last 24 Hrs  T(C): 36.6 (04 Feb 2020 23:01), Max: 36.6 (04 Feb 2020 23:01)  T(F): 97.8 (04 Feb 2020 23:01), Max: 97.8 (04 Feb 2020 23:01)  HR: 35 (04 Feb 2020 23:01) (35 - 41)  BP: 138/58 (04 Feb 2020 23:01) (124/55 - 138/58)  RR: 16 (04 Feb 2020 23:01) (16 - 17)  SpO2: 100% (04 Feb 2020 23:01) (99% - 100%)    PHYSICAL EXAM:  GENERAL: NAD, elderly gentleman  HEAD:  Atraumatic, Normocephalic  EYES: EOMI, PERRLA, conjunctiva and sclera clear  NECK: Supple, No JVD  CHEST/LUNG: Clear to auscultation bilaterally; No wheeze  HEART: Regular rate and rhythm; No murmurs, rubs, or gallops  ABDOMEN: Soft, Nontender, Nondistended; Bowel sounds present  EXTREMITIES:  2+ Peripheral Pulses, No clubbing, cyanosis, or edema  PSYCH: AAOx3  NEUROLOGY: non-focal  SKIN: shiny, erythematous skin on bilateral LEs

## 2020-02-04 NOTE — H&P ADULT - PROBLEM SELECTOR PLAN 1
- admit to CCU  - EP consult  - troponin negative  - supplemental O2 prn  - maintain cardiac arrest chest pads on - admit to CCU  - EP consult  - initial troponin negative; follow up series  - supplemental O2 prn  - maintain cardiac arrest chest pads on - admit to CCU  - EP consult to evaluate for possible pacemaker  - initial troponin negative; follow up series  - supplemental O2 prn  - maintain cardiac arrest chest pads on  - atropine at bedside  - maintain Mg>2, K>4  - TTE

## 2020-02-04 NOTE — H&P ADULT - ATTENDING COMMENTS
89 year old symptomatic bradycardia found to have second degree heart block    -Admit to CCU    #Bradycardia  -second degree heart block noted  -pacer pads to be placed at bedside  -get morning  TSH,mg and phos  -atropine prn for symptomatic 1st and 2nd degree heart block  -get morning echo  -pt to be evaluated by EP for possible PPM    #Normocytic Anemia  -pt currently asymptomatic  -trend cbc    #LUIS ALFREDO  -baseline kidney function not known  -gentle IVF hydration  -trend kidney function    #Hx of AFIB  -hold all rate control medications  -pt on eliquis    #BPH  -pt on tamsulosin    #hx of Dm2  -currently diet controlled  -get morning A1C    #Advanced Directives  -full code    #DVT ppx  -on eliquis

## 2020-02-04 NOTE — H&P ADULT - NSICDXPASTMEDICALHX_GEN_ALL_CORE_FT
PAST MEDICAL HISTORY:  Afib     Anemia     Arthritis     BPH (benign prostatic hyperplasia)     CKD (chronic kidney disease)     DM2 (diabetes mellitus, type 2)     Gout     HTN (hypertension)     PVD (peripheral vascular disease)     Spinal stenosis     UTI (urinary tract infection)

## 2020-02-04 NOTE — ED PROVIDER NOTE - PROGRESS NOTE DETAILS
Palmira YUN for ED attending, Dr. Diaz: Discussed with Dr. Allred, EP cardiologist, says he will see pt in the AM unless the pt becomes unstable during the night, agrees with plan. d/w , cardio on call. agrees with plan. admit to ccu. MD RAVEN

## 2020-02-04 NOTE — H&P ADULT - ASSESSMENT
89 y/o male with PMHx of Afib on Eliquis, HTN, CKD, DM2, BPH,  PVD, and anemia presenting with asymptomatic 2nd degree Mobitz I AV heart block.

## 2020-02-04 NOTE — ED PROVIDER NOTE - OBJECTIVE STATEMENT
88 y/o male with PMHx of Afib on Eliquis, HTN, CKD, DM2, BPH,  PVD, and anemia presents to the ED BIBEMS from home after receiving a phone call from heart monitor company telling him to come to ED for evaluation. EMS reports heart block on EKG. Has been wearing monitor since 4 PM today when cardiologist found pt in bradycardia and pt refused to go to ED. Pt denies SOB, chest pain, or fever. Former smoker. Allergic to penicillins. PCP: Dr. Cami Suggs.

## 2020-02-04 NOTE — ED PROVIDER NOTE - PMH
Afib    Anemia    Arthritis    BPH (benign prostatic hyperplasia)    CKD (chronic kidney disease)    DM2 (diabetes mellitus, type 2)    Gout    HTN (hypertension)    PVD (peripheral vascular disease)    Spinal stenosis    UTI (urinary tract infection)

## 2020-02-04 NOTE — H&P ADULT - HISTORY OF PRESENT ILLNESS
89 y/o male with PMHx of Afib on Eliquis, HTN, CKD, DM2, BPH,  PVD, and anemia presents to the ED from home after receiving a phone call from heart monitor company telling him to come to ED for evaluation. EMS reports heart block on EKG. He received a [employer] heart monitor around 4 PM today when his doctor found him to be bradycardic and he refused to go to the ED. Pt denies SOB, chest pain, or fever. No prior episodes of dizziness, lightheadedness, nausea or vomiting. Former heavy smoker, quit in 1963. Currently drinks 2 glasses of wine daily, but also drinks vodka and beer too. Denies illicit drug use. Allergic to penicillins.

## 2020-02-04 NOTE — H&P ADULT - NSHPSOCIALHISTORY_GEN_ALL_CORE
Former heavy smoker, quit in 1963. Currently drinks 2 glasses of wine daily, but also drinks vodka and beer too. Denies illicit drug use.

## 2020-02-04 NOTE — H&P ADULT - PROBLEM SELECTOR PLAN 3
- creatinine 1.98 on admission  - unknown baseline  - continue to monitor  - avoid nephrotoxic agents  - IVF hydration prior to any contrast studies/procedures

## 2020-02-04 NOTE — ED ADULT TRIAGE NOTE - CHIEF COMPLAINT QUOTE
PT P/W c/o abnormal heart rhythm from home.  pt wears a holter monitor and the company called him at home to let him know that he is in a heart block 2nd degree type II.  pt is currently asymptomatic upon arrival to ed with no complaints.

## 2020-02-05 ENCOUNTER — NON-APPOINTMENT (OUTPATIENT)
Age: 85
End: 2020-02-05

## 2020-02-05 LAB
ALBUMIN SERPL ELPH-MCNC: 3.1 G/DL — LOW (ref 3.3–5)
ALP SERPL-CCNC: 153 U/L — HIGH (ref 40–120)
ALT FLD-CCNC: 22 U/L — SIGNIFICANT CHANGE UP (ref 12–78)
ANION GAP SERPL CALC-SCNC: 5 MMOL/L — SIGNIFICANT CHANGE UP (ref 5–17)
AST SERPL-CCNC: 18 U/L — SIGNIFICANT CHANGE UP (ref 15–37)
BASOPHILS # BLD AUTO: 0.04 K/UL — SIGNIFICANT CHANGE UP (ref 0–0.2)
BASOPHILS NFR BLD AUTO: 0.8 % — SIGNIFICANT CHANGE UP (ref 0–2)
BILIRUB SERPL-MCNC: 0.4 MG/DL — SIGNIFICANT CHANGE UP (ref 0.2–1.2)
BUN SERPL-MCNC: 42 MG/DL — HIGH (ref 7–23)
CALCIUM SERPL-MCNC: 8.6 MG/DL — SIGNIFICANT CHANGE UP (ref 8.5–10.1)
CHLORIDE SERPL-SCNC: 112 MMOL/L — HIGH (ref 96–108)
CHOLEST SERPL-MCNC: 137 MG/DL — SIGNIFICANT CHANGE UP (ref 10–199)
CO2 SERPL-SCNC: 27 MMOL/L — SIGNIFICANT CHANGE UP (ref 22–31)
CREAT SERPL-MCNC: 1.72 MG/DL — HIGH (ref 0.5–1.3)
EOSINOPHIL # BLD AUTO: 0.27 K/UL — SIGNIFICANT CHANGE UP (ref 0–0.5)
EOSINOPHIL NFR BLD AUTO: 5.2 % — SIGNIFICANT CHANGE UP (ref 0–6)
GLUCOSE SERPL-MCNC: 116 MG/DL — HIGH (ref 70–99)
HBA1C BLD-MCNC: 5.8 % — HIGH (ref 4–5.6)
HCT VFR BLD CALC: 34 % — LOW (ref 39–50)
HDLC SERPL-MCNC: 64 MG/DL — SIGNIFICANT CHANGE UP
HGB BLD-MCNC: 11.1 G/DL — LOW (ref 13–17)
IMM GRANULOCYTES NFR BLD AUTO: 0.4 % — SIGNIFICANT CHANGE UP (ref 0–1.5)
LIPID PNL WITH DIRECT LDL SERPL: 65 MG/DL — SIGNIFICANT CHANGE UP
LYMPHOCYTES # BLD AUTO: 1.3 K/UL — SIGNIFICANT CHANGE UP (ref 1–3.3)
LYMPHOCYTES # BLD AUTO: 25 % — SIGNIFICANT CHANGE UP (ref 13–44)
MCHC RBC-ENTMCNC: 31.3 PG — SIGNIFICANT CHANGE UP (ref 27–34)
MCHC RBC-ENTMCNC: 32.6 GM/DL — SIGNIFICANT CHANGE UP (ref 32–36)
MCV RBC AUTO: 95.8 FL — SIGNIFICANT CHANGE UP (ref 80–100)
MONOCYTES # BLD AUTO: 0.45 K/UL — SIGNIFICANT CHANGE UP (ref 0–0.9)
MONOCYTES NFR BLD AUTO: 8.6 % — SIGNIFICANT CHANGE UP (ref 2–14)
NEUTROPHILS # BLD AUTO: 3.13 K/UL — SIGNIFICANT CHANGE UP (ref 1.8–7.4)
NEUTROPHILS NFR BLD AUTO: 60 % — SIGNIFICANT CHANGE UP (ref 43–77)
PLATELET # BLD AUTO: 168 K/UL — SIGNIFICANT CHANGE UP (ref 150–400)
POTASSIUM SERPL-MCNC: 3.8 MMOL/L — SIGNIFICANT CHANGE UP (ref 3.5–5.3)
POTASSIUM SERPL-SCNC: 3.8 MMOL/L — SIGNIFICANT CHANGE UP (ref 3.5–5.3)
PROT SERPL-MCNC: 5.9 GM/DL — LOW (ref 6–8.3)
RBC # BLD: 3.55 M/UL — LOW (ref 4.2–5.8)
RBC # FLD: 14.8 % — HIGH (ref 10.3–14.5)
SODIUM SERPL-SCNC: 144 MMOL/L — SIGNIFICANT CHANGE UP (ref 135–145)
TOTAL CHOLESTEROL/HDL RATIO MEASUREMENT: 2.2 RATIO — LOW (ref 3.4–9.6)
TRIGL SERPL-MCNC: 44 MG/DL — SIGNIFICANT CHANGE UP (ref 10–149)
TROPONIN I SERPL-MCNC: 0.03 NG/ML — SIGNIFICANT CHANGE UP (ref 0.01–0.04)
TSH SERPL-MCNC: 2.79 UU/ML — SIGNIFICANT CHANGE UP (ref 0.34–4.82)
WBC # BLD: 5.21 K/UL — SIGNIFICANT CHANGE UP (ref 3.8–10.5)
WBC # FLD AUTO: 5.21 K/UL — SIGNIFICANT CHANGE UP (ref 3.8–10.5)

## 2020-02-05 PROCEDURE — 99232 SBSQ HOSP IP/OBS MODERATE 35: CPT | Mod: GC

## 2020-02-05 PROCEDURE — 93010 ELECTROCARDIOGRAM REPORT: CPT

## 2020-02-05 PROCEDURE — 93306 TTE W/DOPPLER COMPLETE: CPT | Mod: 26

## 2020-02-05 RX ORDER — AMLODIPINE BESYLATE 2.5 MG/1
5 TABLET ORAL DAILY
Refills: 0 | Status: DISCONTINUED | OUTPATIENT
Start: 2020-02-05 | End: 2020-02-07

## 2020-02-05 RX ORDER — TAMSULOSIN HYDROCHLORIDE 0.4 MG/1
0.4 CAPSULE ORAL AT BEDTIME
Refills: 0 | Status: DISCONTINUED | OUTPATIENT
Start: 2020-02-05 | End: 2020-02-07

## 2020-02-05 RX ORDER — ATROPINE SULFATE 0.1 MG/ML
0.5 SYRINGE (ML) INJECTION ONCE
Refills: 0 | Status: DISCONTINUED | OUTPATIENT
Start: 2020-02-05 | End: 2020-02-07

## 2020-02-05 RX ORDER — SODIUM CHLORIDE 9 MG/ML
1000 INJECTION INTRAMUSCULAR; INTRAVENOUS; SUBCUTANEOUS
Refills: 0 | Status: DISCONTINUED | OUTPATIENT
Start: 2020-02-05 | End: 2020-02-06

## 2020-02-05 RX ORDER — APIXABAN 2.5 MG/1
2.5 TABLET, FILM COATED ORAL
Refills: 0 | Status: DISCONTINUED | OUTPATIENT
Start: 2020-02-05 | End: 2020-02-05

## 2020-02-05 RX ADMIN — TAMSULOSIN HYDROCHLORIDE 0.4 MILLIGRAM(S): 0.4 CAPSULE ORAL at 21:27

## 2020-02-05 RX ADMIN — AMLODIPINE BESYLATE 5 MILLIGRAM(S): 2.5 TABLET ORAL at 12:31

## 2020-02-05 RX ADMIN — Medication 81 MILLIGRAM(S): at 12:31

## 2020-02-05 NOTE — HISTORY OF PRESENT ILLNESS
[FreeTextEntry1] : Pt is a 89 y/o M who presents today for f/u. PMH afib on Eliquis, CKD, HTN, PVD, HLD.  He states that he has been feeling well and has no complaints today.  He is compliant with medications.  Denies any major bleeding problems.  HR today is noted to be 33, ECG shows 2:1 AVB\par TTE 02/2019 EF 77% mild-mod MR/TR, mild AI, mild-mod LVH\par Nuclear stress test 02/2019 normal myocardial perfusion\par \par Tchol 153\par HDL 66\par LDL 79\par PMH: PVD s/p peripheral stent RLE with Dr Singh, afib on Eliquis, ambulates with cane, HLD, HTN, colon cancer\par Smoking status: quit in 1963\par Current exercise: none\par Daily water intake: 50 oz\par Daily caffeine intake: 1-2 cups coffee\par OTC medications: ASA\par Family hx: pt denies any immediate family history cardiac disease\par Previous cardiac testing: unsure\par Previous hospitalizations: gout/cellulitis\par surgeries: colon cancer, partial nephrectomy 2006 benign mass\par

## 2020-02-05 NOTE — PATIENT PROFILE ADULT - VISION (WITH CORRECTIVE LENSES IF THE PATIENT USUALLY WEARS THEM):
Partially impaired: cannot see medication labels or newsprint, but can see obstacles in path, and the surrounding layout; can count fingers at arm's length/reading glasses at bedside

## 2020-02-05 NOTE — PROGRESS NOTE ADULT - SUBJECTIVE AND OBJECTIVE BOX
HPI:  88 y/o male with PMHx of Afib on Eliquis, HTN, CKD, DM2, BPH,  PVD, and anemia presents to the ED from home after receiving a phone call from heart monitor company telling him to come to ED for evaluation. EMS reports heart block on EKG. He received a XioPatch heart monitor around 4 PM today when his doctor found him to be bradycardic and he refused to go to the ED. Pt denies SOB, chest pain, or fever. No prior episodes of dizziness, lightheadedness, nausea or vomiting. Former heavy smoker, quit in 1963. Currently drinks 2 glasses of wine daily, but also drinks vodka and beer too. Denies illicit drug use. Allergic to penicillins. (04 Feb 2020 23:02)    Subjective: Pt was seen and examined at bedside. Per the nurse, since his admission to the floor there have been no acute events over night. With the exception of transient episodes of bradycardia, the pt remained vitally stable. This morning the pt states that he is feeling "perfectly fine". He states that he was never symptomatic. On further review of systems, he denies chest pain, SOB, HA, diaphoresis . Remaining ROS was unremarkable. Pt made aware he will be going for PPM placement tomorrow  Pt has no further complaints or concerns.          Vital Signs Last 24 Hrs  T(C): 35.6 (05 Feb 2020 17:34), Max: 36.6 (04 Feb 2020 23:01)  T(F): 96 (05 Feb 2020 17:34), Max: 97.8 (04 Feb 2020 23:01)  HR: 40 (05 Feb 2020 15:00) (32 - 66)  BP: 139/42 (05 Feb 2020 15:00) (121/63 - 147/101)  BP(mean): 67 (05 Feb 2020 15:00) (57 - 113)  RR: 16 (05 Feb 2020 15:00) (11 - 22)  SpO2: 98% (05 Feb 2020 11:00) (93% - 100%)    I&O's Summary    05 Feb 2020 07:01  -  05 Feb 2020 17:57  --------------------------------------------------------  IN: 0 mL / OUT: 475 mL / NET: -475 mL        CAPILLARY BLOOD GLUCOSE      PHYSICAL EXAM:    Constitutional: NAD, awake and alert, well-developed  HEENT: PERR, EOMI, Normal Hearing, MMM  Neck: Soft and supple, No LAD, No JVD  Respiratory: Breath sounds are clear bilaterally, No wheezing, rales or rhonchi  Cardiovascular: S1 and S2, regular rate and rhythm, no Murmurs, gallops or rubs  Gastrointestinal: Bowel Sounds present, soft, nontender, nondistended, no guarding, no rebound  Extremities: No peripheral edema  Vascular: 2+ peripheral pulses  Neurological: A/O x 3, no focal deficits  Musculoskeletal: 5/5 strength b/l upper and lower extremities  Skin: No rashes    MEDICATIONS  (STANDING):  amLODIPine   Tablet 5 milliGRAM(s) Oral daily  aspirin  chewable 81 milliGRAM(s) Oral daily  sodium chloride 0.9%. 1000 milliLiter(s) (100 mL/Hr) IV Continuous <Continuous>  tamsulosin 0.4 milliGRAM(s) Oral at bedtime    MEDICATIONS  (PRN):  atropine Injectable 0.5 milliGRAM(s) IntraMuscular once PRN hemodynamic instability  ondansetron Injectable 4 milliGRAM(s) IV Push every 6 hours PRN Nausea  senna 2 Tablet(s) Oral at bedtime PRN Constipation      LABS: All Labs Reviewed:                        11.1 5.21  )-----------( 168      ( 05 Feb 2020 07:04 )             34.0     02-05    144  |  112<H>  |  42<H>  ----------------------------<  116<H>  3.8   |  27  |  1.72<H>    Ca    8.6      05 Feb 2020 07:04  Mg     2.2     02-04    TPro  5.9<L>  /  Alb  3.1<L>  /  TBili  0.4  /  DBili  x   /  AST  18  /  ALT  22  /  AlkPhos  153<H>  02-05      CARDIAC MARKERS ( 05 Feb 2020 07:04 )  0.038 ng/mL / x     / x     / x     / x      CARDIAC MARKERS ( 05 Feb 2020 02:08 )  0.032 ng/mL / x     / x     / x     / x      CARDIAC MARKERS ( 04 Feb 2020 20:57 )  0.022 ng/mL / x     / x     / x     / x        imaging studies:   < from: Transthoracic Echocardiogram (02.05.20 @ 10:52) >   Impression     Summary     Left ventricle systolic function appears preserved in the presence of a   cardiac arrhythmia. Left ventricle size and structure are within normal   limitations. Visual estimation of left ventricle ejection fraction is 70%.   The left atriumis mildly dilated.   The right atrium appears mildly dilated.   The right ventricle is mildly dilated.   .   Fibrocalcific changes noted to the Aortic valve leaflets with preserved   leaflet excursion.   Trace aortic regurgitation is present.   The ascending aorta appears to be mildly dilated.   The sinotubular junction is mildly calcified.   Mild mitral annular calcification is present.   Fibrocalcific changes noted to the mitral valve leaflets with preserved   leaflet excursion.   Mild to Moderate mitral regurgitation is present.   The tricuspid valve leaflets are thin and pliable; valve motion is normal.   Mild to Moderate Tricuspid regurgitation is present.   Mild pulmonary hypertension.   Trace pulmonic valvular regurgitation is present.   No evidence of pericardial effusion.   No evidence of pleural effusion.   The IVC is dilated with decreased respiratory variation

## 2020-02-05 NOTE — CONSULT NOTE ADULT - ASSESSMENT
ASSESSMENT & PLAN:  88 y/o male who  has high degree HB not on any AV yesenia blockers., in need of a PPM.  His lst dose of Eliquis was 2/4/2020.  He has an allergy to IVP dye and PCN.  Too have PPM in am  Hold Eliquis  Obtain Echo results  NPO starting tonight at 23:59  Continue to monitor this pt    Thank-you for letting the EP Service  participate in the care of your patient. ASSESSMENT & PLAN:  88 y/o male who  has high degree HB not on any AV yesenia blockers., in need of a PPM.  His last dose of Eliquis was 2/4/2020.  He has an allergy to IVP dye and PCN.  Tropoin level negative, BNP 1169.   Too have PPM in am  Hold Eliquis  Obtain Echo results  NPO starting tonight at 23:59  Continue to monitor this pt    Thank-you for letting the EP Service  participate in the care of your patient.

## 2020-02-05 NOTE — CONSULT NOTE ADULT - SUBJECTIVE AND OBJECTIVE BOX
89y Male  with CC of Bradycardia  He said that he went to his  cardiologist office for a routine visit and was found to be bradycardic.  He then had an out patient monitor applied to his chest the same day and was told by the monitoring company to go to the hospital.  He presented in [facility].  He denies any lightheadedness, syncope or fluttering.  He doesn't take any AV Jasbir blocking agents.  He has KD, known AT [facility] (on Eliquis).  Presently he is alert and orientated BP stable with no seen BBB.    Social:  He lives with son and daughter in law      PAST MEDICAL & SURGICAL HISTORY:  BPH (benign prostatic hyperplasia)  UTI (urinary tract infection)  DM2 (diabetes mellitus, type 2)  CKD (chronic kidney disease)  PVD (peripheral vascular disease)  HTN (hypertension)  Afib  Anemia  Gout  Spinal stenosis  Arthritis  Kidney Cancer-14 years ago  Cellulitis of left lower extremity "years ago"    Past surgical history  Colon Cancer with bowel  resection 22 years ago            MEDICATIONS  (STANDING):  amLODIPine   Tablet 5 milliGRAM(s) Oral daily  aspirin  chewable 81 milliGRAM(s) Oral daily  sodium chloride 0.9%. 1000 milliLiter(s) (100 mL/Hr) IV Continuous <Continuous>  tamsulosin 0.4 milliGRAM(s) Oral at bedtime    MEDICATIONS  (PRN):  atropine Injectable 0.5 milliGRAM(s) IntraMuscular once PRN hemodynamic instability  ondansetron Injectable 4 milliGRAM(s) IV Push every 6 hours PRN Nausea  senna 2 Tablet(s) Oral at bedtime PRN Constipation      Allergies    penicillins (Hives)    Intolerances        SOCIAL HISTORY: Denies tobacco, etoh abuse or illicit drug use    FAMILY HISTORY:  Patient's mother is   Patient's father is       Vital Signs Last 24 Hrs  T(C): 36.2 (2020 06:27), Max: 36.6 (2020 23:01)  T(F): 97.1 (2020 06:27), Max: 97.8 (2020 23:01)  HR: 36 (2020 06:00) (34 - 56)  BP: 132/47 (2020 06:00) (123/37 - 144/58)  BP(mean): 68 (2020 06:00) (57 - 76)  RR: 12 (2020 06:00) (12 - 22)  SpO2: 97% (2020 06:00) (93% - 100%)    REVIEW OF SYSTEMS:    CONSTITUTIONAL:  As per HPI.  HEENT:  Eyes:  No diplopia or blurred vision. ENT:  No earache, sore throat or runny nose.  CARDIOVASCULAR:  No pressure, squeezing, strangling, tightness, heaviness or aching about the chest, neck, axilla or epigastrium.  RESPIRATORY:  No cough, shortness of breath, PND or orthopnea.  GASTROINTESTINAL:  No nausea, vomiting or diarrhea.  GENITOURINARY:  No dysuria, frequency or urgency.  MUSCULOSKELETAL:  As per HPI.  SKIN:  No change in skin, hair or nails.  NEUROLOGIC:  No paresthesias, fasciculations, seizures or weakness.  PSYCHIATRIC:  No disorder of thought or mood.  ENDOCRINE:  No heat or cold intolerance, polyuria or polydipsia.  HEMATOLOGICAL:  No easy bruising or bleedings:  .     PHYSICAL EXAMINATION:    GENERAL APPEARANCE:  Pt. is not currently dyspneic, in no distress. Pt. is alert, oriented, and pleasant.  HEENT:  Pupils are normal and react normally. No icterus. Mucous membranes well colored.  NECK:  Supple. No lymphadenopathy. Jugular venous pressure not elevated. Carotids equal.   HEART:   The cardiac impulse has a normal quality. There are no murmurs, rubs or gallops noted  CHEST:  Chest is clear to auscultation. Normal respiratory effort.  ABDOMEN:  Soft and nontender.   EXTREMITIES:  There is no edema.   SKIN:  No rash or significant lesions are noted.    I&O's Summary      LABS:                        11.1   5.21  )-----------( 168      ( 2020 07:04 )             34.0     02-05    144  |  112<H>  |  42<H>  ----------------------------<  116<H>  3.8   |  27  |  1.72<H>    Ca    8.6      2020 07:04  Mg     2.2     02-04    TPro  5.9<L>  /  Alb  3.1<L>  /  TBili  0.4  /  DBili  x   /  AST  18  /  ALT  22  /  AlkPhos  153<H>  02-05    LIVER FUNCTIONS - ( 2020 07:04 )  Alb: 3.1 g/dL / Pro: 5.9 gm/dL / ALK PHOS: 153 U/L / ALT: 22 U/L / AST: 18 U/L / GGT: x             CARDIAC MARKERS ( 2020 07:04 )  0.038 ng/mL / x     / x     / x     / x      CARDIAC MARKERS ( 2020 02:08 )  0.032 ng/mL / x     / x     / x     / x      CARDIAC MARKERS ( 2020 20:57 )  0.022 ng/mL / x     / x     / x     / x                EKG:     TELEMETRY: Presently in Heart Block TYPE II, on the chart there are episodes of CHB    CARDIAC TESTS:    RADIOLOGY & ADDITIONAL STUDIES: 89y Male  with CC of Bradycardia  He said that he went to his  cardiologist office for a routine visit and was found to be bradycardic.  He then had an out patient monitor applied to his chest the same day and was told by the monitoring company to go to the hospital.  He presented in [facility].  He denies any lightheadedness, syncope or fluttering.  He doesn't take any AV Jasbir blocking agents.  He has KD, known AT [facility] (on Eliquis).  Presently he is alert and orientated BP stable with no seen BBB.    Social:  He lives with son and daughter in law      PAST MEDICAL & SURGICAL HISTORY:  BPH (benign prostatic hyperplasia)  UTI (urinary tract infection)  DM2 (diabetes mellitus, type 2)  CKD (chronic kidney disease)  PVD (peripheral vascular disease)  HTN (hypertension)  Afib  Anemia  Gout  Spinal stenosis  Arthritis  Kidney Cancer-14 years ago  Cellulitis of left lower extremity "years ago"    Past surgical history  Colon Cancer with bowel  resection 22 years ago            MEDICATIONS  (STANDING):  amLODIPine   Tablet 5 milliGRAM(s) Oral daily  aspirin  chewable 81 milliGRAM(s) Oral daily  sodium chloride 0.9%. 1000 milliLiter(s) (100 mL/Hr) IV Continuous <Continuous>  tamsulosin 0.4 milliGRAM(s) Oral at bedtime    MEDICATIONS  (PRN):  atropine Injectable 0.5 milliGRAM(s) IntraMuscular once PRN hemodynamic instability  ondansetron Injectable 4 milliGRAM(s) IV Push every 6 hours PRN Nausea  senna 2 Tablet(s) Oral at bedtime PRN Constipation      Allergies    penicillins (Hives)    Intolerances        SOCIAL HISTORY: Denies tobacco, etoh abuse or illicit drug use    FAMILY HISTORY:  Patient's mother is   Patient's father is       Vital Signs Last 24 Hrs  T(C): 36.2 (2020 06:27), Max: 36.6 (2020 23:01)  T(F): 97.1 (2020 06:27), Max: 97.8 (2020 23:01)  HR: 36 (2020 06:00) (34 - 56)  BP: 132/47 (2020 06:00) (123/37 - 144/58)  BP(mean): 68 (2020 06:00) (57 - 76)  RR: 12 (2020 06:00) (12 - 22)  SpO2: 97% (2020 06:00) (93% - 100%)    REVIEW OF SYSTEMS:    CONSTITUTIONAL:  As per HPI.  HEENT:  Eyes:  No diplopia or blurred vision. ENT:  No earache, sore throat or runny nose.  CARDIOVASCULAR:  No pressure, squeezing, strangling, tightness, heaviness or aching about the chest, neck, axilla or epigastrium.  RESPIRATORY:  No cough, shortness of breath, PND or orthopnea.  GASTROINTESTINAL:  No nausea, vomiting or diarrhea.  GENITOURINARY:  No dysuria, frequency or urgency.  MUSCULOSKELETAL:  As per HPI.  SKIN:  No change in skin, hair or nails.  NEUROLOGIC:  No paresthesias, fasciculations, seizures or weakness.  PSYCHIATRIC:  No disorder of thought or mood.  ENDOCRINE:  No heat or cold intolerance, polyuria or polydipsia.  HEMATOLOGICAL:  No easy bruising or bleedings:  .     PHYSICAL EXAMINATION:    GENERAL APPEARANCE:  Pt. is not currently dyspneic, in no distress. Pt. is alert, oriented, and pleasant.  HEENT:  Pupils are normal and react normally. No icterus. Mucous membranes well colored.  NECK:  Supple. No lymphadenopathy. Jugular venous pressure not elevated. Carotids equal.   HEART:   The cardiac impulse has a normal quality. There are no murmurs, rubs or gallops noted  CHEST:  Chest is clear to auscultation. Normal respiratory effort.  ABDOMEN:  Soft and nontender.   EXTREMITIES:  There is no edema.   SKIN:  No rash or significant lesions are noted.    I&O's Summary      LABS:                        11.1   5.21  )-----------( 168      ( 2020 07:04 )             34.0     02-05    144  |  112<H>  |  42<H>  ----------------------------<  116<H>  3.8   |  27  |  1.72<H>    Ca    8.6      2020 07:04  Mg     2.2     02-04    TPro  5.9<L>  /  Alb  3.1<L>  /  TBili  0.4  /  DBili  x   /  AST  18  /  ALT  22  /  AlkPhos  153<H>  02-05    LIVER FUNCTIONS - ( 2020 07:04 )  Alb: 3.1 g/dL / Pro: 5.9 gm/dL / ALK PHOS: 153 U/L / ALT: 22 U/L / AST: 18 U/L / GGT: x             CARDIAC MARKERS ( 2020 07:04 )  0.038 ng/mL / x     / x     / x     / x      CARDIAC MARKERS ( 2020 02:08 )  0.032 ng/mL / x     / x     / x     / x      CARDIAC MARKERS ( 2020 20:57 )  0.022 ng/mL / x     / x     / x     / x                EKG:   HB Type II 41 BPM, QRS 98 MS    TELEMETRY: Presently in Heart Block TYPE II, on the chart there are episodes of CHB    CARDIAC TESTS:    RADIOLOGY & ADDITIONAL STUDIES:

## 2020-02-05 NOTE — REVIEW OF SYSTEMS
[see HPI] : see HPI [Negative] : Heme/Lymph [Dyspnea on exertion] : not dyspnea during exertion [Chest  Pressure] : no chest pressure [Shortness Of Breath] : no shortness of breath [Palpitations] : no palpitations [Leg Claudication] : no intermittent leg claudication [Lower Ext Edema] : no extremity edema [Chest Pain] : no chest pain

## 2020-02-05 NOTE — CONSULT NOTE ADULT - SUBJECTIVE AND OBJECTIVE BOX
CHIEF COMPLAINT: Patient is a 89y old  Male who presents with a chief complaint of AV block (04 Feb 2020 23:02)      HPI:  89 y/o male with PMHx of Afib on Eliquis, HTN, CKD, DM2, BPH,  PVD, and anemia presents to the ED from home after receiving a phone call from heart monitor company telling him to come to ED for evaluation. EMS reports heart block on EKG. He received a Nexitch heart monitor around 4 PM today when his doctor found him to be bradycardic and he refused to go to the ED. Pt denies SOB, chest pain, or fever. No prior episodes of dizziness, lightheadedness, nausea or vomiting. Former heavy smoker, quit in 1963. Currently drinks 2 glasses of wine daily, but also drinks vodka and beer too. Denies illicit drug use. Allergic to penicillins. (04 Feb 2020 23:02)    2/5-patient saw his cardiologist out in [place]-for slow HR.  Holter monitor placed.  Patient states he has no dizziness, lightheadedness; received call from his cardiologist to go to ER for very slow HR and "heart block"  In ER, noted to be in high degree AV block and 3rd degree heart block..d/w on call cardiologist and place in CCU and awaiting EPS.  Currently, no chest pains, SOB, or near syncope    PMHx: PAST MEDICAL & SURGICAL HISTORY:  BPH (benign prostatic hyperplasia)  UTI (urinary tract infection)  DM2 (diabetes mellitus, type 2)  CKD (chronic kidney disease)  PVD (peripheral vascular disease)  HTN (hypertension)  Afib  Anemia  Gout  Spinal stenosis  Arthritis  No significant past surgical history        Soc Hx:       Allergies: Allergies    penicillins (Hives)    Intolerances          REVIEW OF SYSTEMS:    CONSTITUTIONAL: No weakness, fevers or chills  EYES/ENT: No visual changes;  No vertigo or throat pain   NECK: No pain or stiffness  RESPIRATORY: No cough, wheezing, hemoptysis; No shortness of breath  CARDIOVASCULAR: No chest pain or palpitations  GASTROINTESTINAL: No abdominal or epigastric pain. No nausea, vomiting, or hematemesis; No diarrhea or constipation. No melena or hematochezia.  GENITOURINARY: No dysuria, frequency or hematuria  NEUROLOGICAL: No numbness or weakness  SKIN: No itching, burning, rashes, or lesions   All other review of systems is negative unless indicated above    Vital Signs Last 24 Hrs  T(C): 36.2 (05 Feb 2020 06:27), Max: 36.6 (04 Feb 2020 23:01)  T(F): 97.1 (05 Feb 2020 06:27), Max: 97.8 (04 Feb 2020 23:01)  HR: 36 (05 Feb 2020 06:00) (34 - 56)  BP: 132/47 (05 Feb 2020 06:00) (123/37 - 144/58)  BP(mean): 68 (05 Feb 2020 06:00) (57 - 76)  RR: 12 (05 Feb 2020 06:00) (12 - 22)  SpO2: 97% (05 Feb 2020 06:00) (93% - 100%)    I&O's Summary      CAPILLARY BLOOD GLUCOSE          PHYSICAL EXAM:   Constitutional: NAD, awake and alert, well-developed  HEENT: PERR, EOMI, Normal Hearing, MMM  Neck: Soft and supple, No LAD, No JVD  Respiratory: Breath sounds are clear bilaterally, No wheezing, rales or rhonchi  Cardiovascular: S1 and S2, regular rate and rhythm, Murmurs,   Gastrointestinal: Bowel Sounds present, soft, nontender, nondistended, no guarding, no rebound  Extremities: No peripheral edema  Vascular: 2+ peripheral pulses  Neurological: A/O x 3, no focal deficits  Musculoskeletal: 5/5 strength b/l upper and lower extremities  Skin: No rashes    MEDICATIONS:  MEDICATIONS  (STANDING):  amLODIPine   Tablet 5 milliGRAM(s) Oral daily  apixaban 2.5 milliGRAM(s) Oral two times a day  aspirin  chewable 81 milliGRAM(s) Oral daily  sodium chloride 0.9%. 1000 milliLiter(s) (100 mL/Hr) IV Continuous <Continuous>  tamsulosin 0.4 milliGRAM(s) Oral at bedtime      LABS: All Labs Reviewed:                        11.1   5.21  )-----------( 168      ( 05 Feb 2020 07:04 )             34.0     02-05    144  |  112<H>  |  42<H>  ----------------------------<  116<H>  3.8   |  27  |  1.72<H>    Ca    8.6      05 Feb 2020 07:04  Mg     2.2     02-04    TPro  5.9<L>  /  Alb  3.1<L>  /  TBili  0.4  /  DBili  x   /  AST  18  /  ALT  22  /  AlkPhos  153<H>  02-05      CARDIAC MARKERS ( 05 Feb 2020 07:04 )  0.038 ng/mL / x     / x     / x     / x      CARDIAC MARKERS ( 05 Feb 2020 02:08 )  0.032 ng/mL / x     / x     / x     / x      CARDIAC MARKERS ( 04 Feb 2020 20:57 )  0.022 ng/mL / x     / x     / x     / x          Serum Pro-Brain Natriuretic Peptide: 1169 pg/mL (02-04 @ 20:57)    Blood Culture:   lipid profile       RADIOLOGY:    EKG:    Telemetry:  second degre type I av block and one brief run of CHB with V escape at 6/25:08 on 2/5 (printed and placed in chart)    ECHO:

## 2020-02-05 NOTE — PHYSICAL EXAM
[General Appearance - Well Developed] : well developed [Normal Appearance] : normal appearance [Well Groomed] : well groomed [General Appearance - Well Nourished] : well nourished [No Deformities] : no deformities [General Appearance - In No Acute Distress] : no acute distress [Eyelids - No Xanthelasma] : the eyelids demonstrated no xanthelasmas [Normal Conjunctiva] : the conjunctiva exhibited no abnormalities [No Oral Pallor] : no oral pallor [Normal Oral Mucosa] : normal oral mucosa [No Oral Cyanosis] : no oral cyanosis [Respiration, Rhythm And Depth] : normal respiratory rhythm and effort [Exaggerated Use Of Accessory Muscles For Inspiration] : no accessory muscle use [Auscultation Breath Sounds / Voice Sounds] : lungs were clear to auscultation bilaterally [Heart Sounds] : normal S1 and S2 [Arterial Pulses Normal] : the arterial pulses were normal [Murmurs] : no murmurs present [Abdomen Soft] : soft [Abdomen Tenderness] : non-tender [Abdomen Mass (___ Cm)] : no abdominal mass palpated [Abnormal Walk] : normal gait [Cyanosis, Localized] : no localized cyanosis [Nail Clubbing] : no clubbing of the fingernails [Gait - Sufficient For Exercise Testing] : the gait was sufficient for exercise testing [Petechial Hemorrhages (___cm)] : no petechial hemorrhages [] : no ischemic changes [Impaired Insight] : insight and judgment were intact [Oriented To Time, Place, And Person] : oriented to person, place, and time [Mood] : the mood was normal [No Anxiety] : not feeling anxious [Affect] : the affect was normal [Bradycardic ___] : the heart rate was bradycardic at [unfilled] bpm [FreeTextEntry1] : b/l LE edema

## 2020-02-05 NOTE — PROGRESS NOTE ADULT - SUBJECTIVE AND OBJECTIVE BOX
Pt has been seen and examined with FP resident, resident supervised agree with a/p       Patient is a 89y old  Male who presents with a chief complaint of AV block (05 Feb 2020 08:44)          PHYSICAL EXAM:  Vital Signs Last 24 Hrs  T(C): 36.4 (05 Feb 2020 13:34), Max: 36.6 (04 Feb 2020 23:01)  T(F): 97.5 (05 Feb 2020 13:34), Max: 97.8 (04 Feb 2020 23:01)  HR: 40 (05 Feb 2020 15:00) (32 - 66)  BP: 139/42 (05 Feb 2020 15:00) (121/63 - 147/101)  BP(mean): 67 (05 Feb 2020 15:00) (57 - 113)  RR: 16 (05 Feb 2020 15:00) (11 - 22)  SpO2: 98% (05 Feb 2020 11:00) (93% - 100%)  general- comfortable   -rs-aeeb,cta  -cvs-s1s2 normal   -p/a-soft,bs+  -extremity- no asymmetrical swelling noted   -cns- non focal         A/P    #High Grade block - PPM tomorrow possibly, ct to monitor in CCU     #dvt pr -scd

## 2020-02-05 NOTE — CONSULT NOTE ADULT - ASSESSMENT
patient with admission for bradycardia, 2econd degree AV block, type I and one brief CHB  1-get ECHO  2-EPS for PPM-should get dual chamber

## 2020-02-05 NOTE — DISCUSSION/SUMMARY
[FreeTextEntry1] : Pt is an 89 y/o M with PMH HTN, HLD, PVD, afib on Eliquis, CKD who presents today for f/u found to be profoundly bradycardia with 2:1 AVB.\par Discussed severity of pts condition with pt and the need for emergent treatment.  He is refusing at this time stating he "just needs to go home".  He left the office AMA.  I offered to call ambulance but pt refused and left upset.  \par Called daughter-in-law and discussed pts condition.  Also spoke with son who confirmed that pt arrived home safetly from OV.  Pt continues to refuse hospital treatment but son will bring pt back to office to place rajan monitor and schedule outpt EP evaluation\par TTE 02/2018 shows EF = 60-65% without significant valvular disease\par TTE 02/2019 EF 77% mild-mod MR/TR, mild AI, mild-mod LVH\par Nuclear stress test 02/2019 normal myocardial perfusion\par \par

## 2020-02-05 NOTE — PROGRESS NOTE ADULT - ASSESSMENT
89 y/o male with PMHx of Afib on Eliquis, HTN, CKD, DM2, BPH,  PVD, and anemia presents to the ED with an asymptomatic heart block detected on Zeo. In the ED he was found to be bradycardic with HR in the 30's. Laboratory findings were significant for Negative troponin x 3 and a BNP of 1,169. He was given a one time dose of IM Atropine and initiated on IVF, then admitted to the floor with Bradycardia in the setting of Type 1 2nd degree Heart Block. TTE displayed findings suggestive of a Dilated Cardiomyopathy with a preserved EF of 70%. Cardiology and EP were consulted, PPM to be placed in the am.       #Bradycardia in the setting of Type 1 2nd degree Heart Block  -Pt VSS, Asx  -S/p IM Atropine x 1 in ED  -EP: PPM in the morning NPO after midnight, hold eliquis  -TSH,mg and phos: WNL  -Cardio: Recommend dual chamber PPM   -ECHO: Dialted RA/LA/RV, LVEF 70%    #Normocytic Anemia  -pt currently asymptomatic  -trend cbc    #LUIS ALFREDO  -BUN: Cr- 45/1.72 baseline unknown  -Continue gentle IVF hydration with 100cc/hr NS  -trend kidney function    #Hx of AFIB  -hold all rate control medications, in light of bradycardia   -pt on eliquis    #BPH  -Continue tamsulosin    #Advanced Directives  -full code    #DVT ppx  -on eliquis

## 2020-02-06 LAB
ANION GAP SERPL CALC-SCNC: 6 MMOL/L — SIGNIFICANT CHANGE UP (ref 5–17)
BUN SERPL-MCNC: 44 MG/DL — HIGH (ref 7–23)
CALCIUM SERPL-MCNC: 8.6 MG/DL — SIGNIFICANT CHANGE UP (ref 8.5–10.1)
CHLORIDE SERPL-SCNC: 111 MMOL/L — HIGH (ref 96–108)
CO2 SERPL-SCNC: 25 MMOL/L — SIGNIFICANT CHANGE UP (ref 22–31)
CREAT SERPL-MCNC: 1.62 MG/DL — HIGH (ref 0.5–1.3)
GLUCOSE SERPL-MCNC: 125 MG/DL — HIGH (ref 70–99)
HCT VFR BLD CALC: 33.5 % — LOW (ref 39–50)
HGB BLD-MCNC: 10.9 G/DL — LOW (ref 13–17)
MCHC RBC-ENTMCNC: 31.2 PG — SIGNIFICANT CHANGE UP (ref 27–34)
MCHC RBC-ENTMCNC: 32.5 GM/DL — SIGNIFICANT CHANGE UP (ref 32–36)
MCV RBC AUTO: 96 FL — SIGNIFICANT CHANGE UP (ref 80–100)
PLATELET # BLD AUTO: 162 K/UL — SIGNIFICANT CHANGE UP (ref 150–400)
POTASSIUM SERPL-MCNC: 4.1 MMOL/L — SIGNIFICANT CHANGE UP (ref 3.5–5.3)
POTASSIUM SERPL-SCNC: 4.1 MMOL/L — SIGNIFICANT CHANGE UP (ref 3.5–5.3)
RBC # BLD: 3.49 M/UL — LOW (ref 4.2–5.8)
RBC # FLD: 15 % — HIGH (ref 10.3–14.5)
SODIUM SERPL-SCNC: 142 MMOL/L — SIGNIFICANT CHANGE UP (ref 135–145)
WBC # BLD: 4.95 K/UL — SIGNIFICANT CHANGE UP (ref 3.8–10.5)
WBC # FLD AUTO: 4.95 K/UL — SIGNIFICANT CHANGE UP (ref 3.8–10.5)

## 2020-02-06 PROCEDURE — 33208 INSRT HEART PM ATRIAL & VENT: CPT | Mod: KX

## 2020-02-06 PROCEDURE — 71045 X-RAY EXAM CHEST 1 VIEW: CPT | Mod: 26

## 2020-02-06 PROCEDURE — 93010 ELECTROCARDIOGRAM REPORT: CPT | Mod: 76

## 2020-02-06 PROCEDURE — 99232 SBSQ HOSP IP/OBS MODERATE 35: CPT | Mod: GC

## 2020-02-06 RX ORDER — VANCOMYCIN HCL 1 G
1000 VIAL (EA) INTRAVENOUS ONCE
Refills: 0 | Status: COMPLETED | OUTPATIENT
Start: 2020-02-06 | End: 2020-02-06

## 2020-02-06 RX ORDER — MUPIROCIN 20 MG/G
1 OINTMENT TOPICAL EVERY 12 HOURS
Refills: 0 | Status: DISCONTINUED | OUTPATIENT
Start: 2020-02-06 | End: 2020-02-06

## 2020-02-06 RX ORDER — MUPIROCIN 20 MG/G
1 OINTMENT TOPICAL EVERY 12 HOURS
Refills: 0 | Status: DISCONTINUED | OUTPATIENT
Start: 2020-02-06 | End: 2020-02-07

## 2020-02-06 RX ORDER — APIXABAN 2.5 MG/1
2.5 TABLET, FILM COATED ORAL EVERY 12 HOURS
Refills: 0 | Status: DISCONTINUED | OUTPATIENT
Start: 2020-02-07 | End: 2020-02-07

## 2020-02-06 RX ADMIN — TAMSULOSIN HYDROCHLORIDE 0.4 MILLIGRAM(S): 0.4 CAPSULE ORAL at 21:13

## 2020-02-06 RX ADMIN — MUPIROCIN 1 APPLICATION(S): 20 OINTMENT TOPICAL at 21:26

## 2020-02-06 RX ADMIN — Medication 250 MILLIGRAM(S): at 11:19

## 2020-02-06 RX ADMIN — Medication 81 MILLIGRAM(S): at 15:20

## 2020-02-06 RX ADMIN — AMLODIPINE BESYLATE 5 MILLIGRAM(S): 2.5 TABLET ORAL at 15:20

## 2020-02-06 NOTE — DIETITIAN INITIAL EVALUATION ADULT. - PROBLEM SELECTOR PLAN 1
- admit to CCU  - EP consult to evaluate for possible pacemaker  - initial troponin negative; follow up series  - supplemental O2 prn  - maintain cardiac arrest chest pads on  - atropine at bedside  - maintain Mg>2, K>4  - TTE

## 2020-02-06 NOTE — DIETITIAN INITIAL EVALUATION ADULT. - PHYSICAL APPEARANCE
other (specify) NFPE Findings: MUSCLE WASTING: Moderate: (x  )Temporalis ( x ) Clavicle ( x ) Deltoid ( x )Scapula ( x ) Interosseous MILD:( x ) Quads ( x ) Patellar ( x ) Calves    FAT LOSS: Moderate: ( x ) Ocular (x  ) Ribs (x  ) Triceps MILD: ( x ) Buccal   Skin: PU stage 1 Buttocks with +1 edema documented. Nithin scale= 16 (high risk for skin breakdown)

## 2020-02-06 NOTE — CHART NOTE - NSCHARTNOTEFT_GEN_A_CORE
Upon Nutritional Assessment by the Registered Dietitian your patient was determined to meet criteria / has evidence of the following diagnosis/diagnoses:          [ ]  Mild Protein Calorie Malnutrition        [ ]  Moderate Protein Calorie Malnutrition        [ x] Severe Protein Calorie Malnutrition        [ ] Unspecified Protein Calorie Malnutrition        [ ] Underweight / BMI <19        [ ] Morbid Obesity / BMI > 40      Findings as based on:  •  Comprehensive nutrition assessment and consultation  •  Calorie counts (nutrient intake analysis)  •  Food acceptance and intake status from observations by staff  •  Follow up  •  Patient education  •  Intervention secondary to interdisciplinary rounds  •   concerns      *********Malnutrition severe protein calorie malnutrition in context of acute on chronic illness.   Etiology AEB inability to consume adequate nutrition 2/2 AV block/lethargy.   Signs/Symptoms poor po intake x 5 days PTA <50%, mild/moderate muscle/fat wasting, PU stage 1, +1 edema.   Goal/Expected Outcome Optimal intake to meet ENN. Reduced s/s of malnutrition.    · Additional Recommendations  1) liberalize diet to low sodium   2) Ensure enlive shake 8oz po BID   3) MVI w/ minerals to meet RDI's   4) monitor daily weights   5) monitor labs/lytes replete   6) encourage pt to order meals for food preferences and optimal intake.          PROVIDER Section:     By signing this assessment you are acknowledging and agree with the diagnosis/diagnoses assigned by the Registered Dietitian    Comments:

## 2020-02-06 NOTE — PROGRESS NOTE ADULT - ASSESSMENT
89M asymptomatic Bradycardia found on Outpatient Remote telemetry monitor    #Bradycardia  -EP appreciated, s/p PPM 2/6/2020  -ECHO as above    #Normocytic Anemia  -pt currently asymptomatic  -trend cbc    #LUIS ALFREDO  -BUN: Cr- 45/1.72 baseline unknown  -Continue gentle IVF hydration with 100cc/hr NS  -trend kidney function    #Hx of AFIB  -hold all rate control medications, in light of bradycardia   -pt on eliquis    #BPH  -Continue tamsulosin    #Advanced Directives  -full code    #DVT ppx  -on eliquis

## 2020-02-06 NOTE — DIETITIAN INITIAL EVALUATION ADULT. - PERTINENT MEDS FT
MEDICATIONS  (STANDING):  amLODIPine   Tablet 5 milliGRAM(s) Oral daily  aspirin  chewable 81 milliGRAM(s) Oral daily  sodium chloride 0.9%. 1000 milliLiter(s) (100 mL/Hr) IV Continuous <Continuous>  tamsulosin 0.4 milliGRAM(s) Oral at bedtime    MEDICATIONS  (PRN):  atropine Injectable 0.5 milliGRAM(s) IntraMuscular once PRN hemodynamic instability  ondansetron Injectable 4 milliGRAM(s) IV Push every 6 hours PRN Nausea  senna 2 Tablet(s) Oral at bedtime PRN Constipation

## 2020-02-06 NOTE — PROGRESS NOTE ADULT - SUBJECTIVE AND OBJECTIVE BOX
HPI:  89M PMHx Afib on Eliquis, HTN, CKD, DM2, BPH, PVD, anemia, presented 2/4/2020 for arrhyhtmia seen on Ziopatch, sent to ED for bradycardia. Pt denies SOB, chest pain, or fever. No prior episodes of dizziness, lightheadedness, nausea or vomiting. Former heavy smoker, quit in 1963. Currently drinks 2 glasses of wine daily, but also drinks vodka and beer too. Denies illicit drug use. Allergic to penicillins. (04 Feb 2020 23:02)    Subjective: Pt was seen and examined at bedside, NAD/SHANEKAO. Telemetry monitor reviewed, asmyptomatic bradycardia. PPM placement today, tolerated well, EP notes appreciated.     Vital Signs Last 24 Hrs  T(C): 36.1 (06 Feb 2020 12:29), Max: 36.6 (06 Feb 2020 00:38)  T(F): 97 (06 Feb 2020 12:29), Max: 97.9 (06 Feb 2020 00:38)  HR: 61 (06 Feb 2020 13:15) (29 - 61)  BP: 142/59 (06 Feb 2020 13:15) (109/45 - 149/61)  BP(mean): 81 (06 Feb 2020 13:15) (57 - 99)  RR: 10 (06 Feb 2020 13:15) (10 - 20)  SpO2: 97% (06 Feb 2020 13:15) (93% - 100%)    I&O's Summary    05 Feb 2020 07:01  -  05 Feb 2020 17:57  --------------------------------------------------------  IN: 0 mL / OUT: 475 mL / NET: -475 mL    PHYSICAL EXAM:    Constitutional: NAD, awake and alert, well-developed  HEENT: PERR, EOMI, Normal Hearing, MMM  Neck: Soft and supple, No LAD, No JVD  Respiratory: Breath sounds are clear bilaterally, No wheezing, rales or rhonchi  Cardiovascular: S1 and S2, regular rate and rhythm, no Murmurs, gallops or rubs  Gastrointestinal: Bowel Sounds present, soft, nontender, nondistended, no guarding, no rebound  Extremities: No peripheral edema  Vascular: 2+ peripheral pulses  Neurological: A/O x 3, no focal deficits  Musculoskeletal: 5/5 strength b/l upper and lower extremities  Skin: No rashes    MEDICATIONS  (STANDING):  amLODIPine   Tablet 5 milliGRAM(s) Oral daily  aspirin  chewable 81 milliGRAM(s) Oral daily  sodium chloride 0.9%. 1000 milliLiter(s) (100 mL/Hr) IV Continuous <Continuous>  tamsulosin 0.4 milliGRAM(s) Oral at bedtime    MEDICATIONS  (PRN):  atropine Injectable 0.5 milliGRAM(s) IntraMuscular once PRN hemodynamic instability  ondansetron Injectable 4 milliGRAM(s) IV Push every 6 hours PRN Nausea  senna 2 Tablet(s) Oral at bedtime PRN Constipation    LABS: All Labs Reviewed:                        11.1 5.21  )-----------( 168      ( 05 Feb 2020 07:04 )             34.0     02-05    144  |  112<H>  |  42<H>  ----------------------------<  116<H>  3.8   |  27  |  1.72<H>    Ca    8.6      05 Feb 2020 07:04  Mg     2.2     02-04    TPro  5.9<L>  /  Alb  3.1<L>  /  TBili  0.4  /  DBili  x   /  AST  18  /  ALT  22  /  AlkPhos  153<H>  02-05    Thyroid Stimulating Hormone, Serum in AM (02.05.20 @ 07:04)    Thyroid Stimulating Hormone, Serum: 2.79 uU/mL  CARDIAC MARKERS ( 05 Feb 2020 07:04 )  0.038 ng/mL / x     / x     / x     / x      CARDIAC MARKERS ( 05 Feb 2020 02:08 )  0.032 ng/mL / x     / x     / x     / x      CARDIAC MARKERS ( 04 Feb 2020 20:57 )  0.022 ng/mL / x     / x     / x     / x        imaging studies:   < from: Transthoracic Echocardiogram (02.05.20 @ 10:52) >   Impression     Summary     Left ventricle systolic function appears preserved in the presence of a   cardiac arrhythmia. Left ventricle size and structure are within normal   limitations. Visual estimation of left ventricle ejection fraction is 70%.   The left atriumis mildly dilated.   The right atrium appears mildly dilated.   The right ventricle is mildly dilated.   .   Fibrocalcific changes noted to the Aortic valve leaflets with preserved   leaflet excursion.   Trace aortic regurgitation is present.   The ascending aorta appears to be mildly dilated.   The sinotubular junction is mildly calcified.   Mild mitral annular calcification is present.   Fibrocalcific changes noted to the mitral valve leaflets with preserved   leaflet excursion.   Mild to Moderate mitral regurgitation is present.   The tricuspid valve leaflets are thin and pliable; valve motion is normal.   Mild to Moderate Tricuspid regurgitation is present.   Mild pulmonary hypertension.   Trace pulmonic valvular regurgitation is present.   No evidence of pericardial effusion.   No evidence of pleural effusion.   The IVC is dilated with decreased respiratory variation

## 2020-02-06 NOTE — DIETITIAN INITIAL EVALUATION ADULT. - PERTINENT LABORATORY DATA
02-06 Na142 mmol/L Glu 125 mg/dL<H> K+ 4.1 mmol/L Cr  1.62 mg/dL<H> BUN 44 mg/dL<H> Phos n/a   Alb n/a   PAB n/a

## 2020-02-06 NOTE — DIETITIAN INITIAL EVALUATION ADULT. - EST PROTEIN NEEDS8
82.46
Principal Discharge DX:	Pneumonia  Secondary Diagnosis:	Sepsis  Secondary Diagnosis:	Shortness of breath  Secondary Diagnosis:	COPD exacerbation

## 2020-02-06 NOTE — PROGRESS NOTE ADULT - ATTENDING COMMENTS
on exam- comfortable   -rs-aeeb, cta  -p/a-soft, bs+  -cvs-s1s2 normal     #monitor in CCU    #possible d/c tomorrow

## 2020-02-07 ENCOUNTER — APPOINTMENT (OUTPATIENT)
Dept: ELECTROPHYSIOLOGY | Facility: CLINIC | Age: 85
End: 2020-02-07

## 2020-02-07 ENCOUNTER — TRANSCRIPTION ENCOUNTER (OUTPATIENT)
Age: 85
End: 2020-02-07

## 2020-02-07 VITALS
SYSTOLIC BLOOD PRESSURE: 106 MMHG | OXYGEN SATURATION: 97 % | RESPIRATION RATE: 17 BRPM | DIASTOLIC BLOOD PRESSURE: 50 MMHG | HEART RATE: 60 BPM

## 2020-02-07 DIAGNOSIS — I44.2 ATRIOVENTRICULAR BLOCK, COMPLETE: ICD-10-CM

## 2020-02-07 LAB
ANION GAP SERPL CALC-SCNC: 5 MMOL/L — SIGNIFICANT CHANGE UP (ref 5–17)
BASOPHILS # BLD AUTO: 0.05 K/UL — SIGNIFICANT CHANGE UP (ref 0–0.2)
BASOPHILS NFR BLD AUTO: 0.7 % — SIGNIFICANT CHANGE UP (ref 0–2)
BUN SERPL-MCNC: 27 MG/DL — HIGH (ref 7–23)
CALCIUM SERPL-MCNC: 9 MG/DL — SIGNIFICANT CHANGE UP (ref 8.5–10.1)
CHLORIDE SERPL-SCNC: 111 MMOL/L — HIGH (ref 96–108)
CO2 SERPL-SCNC: 26 MMOL/L — SIGNIFICANT CHANGE UP (ref 22–31)
CREAT SERPL-MCNC: 1.21 MG/DL — SIGNIFICANT CHANGE UP (ref 0.5–1.3)
EOSINOPHIL # BLD AUTO: 0.29 K/UL — SIGNIFICANT CHANGE UP (ref 0–0.5)
EOSINOPHIL NFR BLD AUTO: 3.9 % — SIGNIFICANT CHANGE UP (ref 0–6)
GLUCOSE SERPL-MCNC: 120 MG/DL — HIGH (ref 70–99)
HCT VFR BLD CALC: 36.4 % — LOW (ref 39–50)
HGB BLD-MCNC: 12.1 G/DL — LOW (ref 13–17)
IMM GRANULOCYTES NFR BLD AUTO: 0.4 % — SIGNIFICANT CHANGE UP (ref 0–1.5)
LYMPHOCYTES # BLD AUTO: 1.04 K/UL — SIGNIFICANT CHANGE UP (ref 1–3.3)
LYMPHOCYTES # BLD AUTO: 14.1 % — SIGNIFICANT CHANGE UP (ref 13–44)
MCHC RBC-ENTMCNC: 31.8 PG — SIGNIFICANT CHANGE UP (ref 27–34)
MCHC RBC-ENTMCNC: 33.2 GM/DL — SIGNIFICANT CHANGE UP (ref 32–36)
MCV RBC AUTO: 95.5 FL — SIGNIFICANT CHANGE UP (ref 80–100)
MONOCYTES # BLD AUTO: 0.55 K/UL — SIGNIFICANT CHANGE UP (ref 0–0.9)
MONOCYTES NFR BLD AUTO: 7.5 % — SIGNIFICANT CHANGE UP (ref 2–14)
NEUTROPHILS # BLD AUTO: 5.4 K/UL — SIGNIFICANT CHANGE UP (ref 1.8–7.4)
NEUTROPHILS NFR BLD AUTO: 73.4 % — SIGNIFICANT CHANGE UP (ref 43–77)
PLATELET # BLD AUTO: 168 K/UL — SIGNIFICANT CHANGE UP (ref 150–400)
POTASSIUM SERPL-MCNC: 4.3 MMOL/L — SIGNIFICANT CHANGE UP (ref 3.5–5.3)
POTASSIUM SERPL-SCNC: 4.3 MMOL/L — SIGNIFICANT CHANGE UP (ref 3.5–5.3)
RBC # BLD: 3.81 M/UL — LOW (ref 4.2–5.8)
RBC # FLD: 15.1 % — HIGH (ref 10.3–14.5)
SODIUM SERPL-SCNC: 142 MMOL/L — SIGNIFICANT CHANGE UP (ref 135–145)
WBC # BLD: 7.36 K/UL — SIGNIFICANT CHANGE UP (ref 3.8–10.5)
WBC # FLD AUTO: 7.36 K/UL — SIGNIFICANT CHANGE UP (ref 3.8–10.5)

## 2020-02-07 PROCEDURE — 93010 ELECTROCARDIOGRAM REPORT: CPT

## 2020-02-07 PROCEDURE — 99239 HOSP IP/OBS DSCHRG MGMT >30: CPT | Mod: GC

## 2020-02-07 RX ADMIN — APIXABAN 2.5 MILLIGRAM(S): 2.5 TABLET, FILM COATED ORAL at 06:23

## 2020-02-07 RX ADMIN — AMLODIPINE BESYLATE 5 MILLIGRAM(S): 2.5 TABLET ORAL at 06:23

## 2020-02-07 RX ADMIN — Medication 81 MILLIGRAM(S): at 11:48

## 2020-02-07 RX ADMIN — MUPIROCIN 1 APPLICATION(S): 20 OINTMENT TOPICAL at 06:23

## 2020-02-07 NOTE — DISCHARGE NOTE PROVIDER - CARE PROVIDER_API CALL
Cami Suggs)  Family Medicine  [address] [place] [address]  Phone: [phone]  Fax: [phone]  Established Patient  Follow Up Time: 1 week    Ed Allred)  Cardiac Electrophysiology; Cardiovascular Disease  [address] [place] [address]  Phone: [phone]  Fax: [phone]  Established Patient  Follow Up Time: 2 weeks

## 2020-02-07 NOTE — DISCHARGE NOTE PROVIDER - PROVIDER TOKENS
PROVIDER:[TOKEN:[[id number]],FOLLOWUP:[1 week],ESTABLISHEDPATIENT:[T]],PROVIDER:[TOKEN:[[id number]],FOLLOWUP:[2 weeks],ESTABLISHEDPATIENT:[T]]

## 2020-02-07 NOTE — DISCHARGE NOTE NURSING/CASE MANAGEMENT/SOCIAL WORK - PATIENT PORTAL LINK FT
You can access the FollowMyHealth Patient Portal offered by [facility] by registering at the following website: [email or web address]. By joining [email or web address]’s FollowMyHealth portal, you will also be able to view your health information using other applications (apps) compatible with our system.

## 2020-02-07 NOTE — DISCHARGE NOTE PROVIDER - NSDCCPCAREPLAN_GEN_ALL_CORE_FT
PRINCIPAL DISCHARGE DIAGNOSIS  Diagnosis: Heart block AV third degree  Assessment and Plan of Treatment: Atrioventricular block (AV block) is a type of heart block in which the conduction between the atria and ventricles of the heart is impaired, resulting in a decreased Heart Rate. Some times patients are unaware of this event, and remain assymptomatic, as you were. However, this impairment, is usually corrected with a a pacemaker,PPM, which is a medical device that generates electrical impulses delivered by electrodes to contract the heart muscles and regulate the electrical conduction system of the heart. Your procedure was uncomplicated and you are stable for Discharge home to resume your home medications and follow up with your PCP, within a week, as well as Dr. Allred in 2 weeks.

## 2020-02-07 NOTE — PROGRESS NOTE ADULT - SUBJECTIVE AND OBJECTIVE BOX
Pt has been seen and examined with FP resident, resident supervised agree with a/p       Patient is a 89y old  Male who presents with a chief complaint of AV block (05 Feb 2020 08:44)          PHYSICAL EXAM:    general- comfortable   -rs-aeeb,cta  -cvs-s1s2 normal   -p/a-soft,bs+  -extremity- no asymmetrical swelling noted   -cns- non focal         A/P    #d/c today, time spent 45 minutes

## 2020-02-07 NOTE — PROGRESS NOTE ADULT - SUBJECTIVE AND OBJECTIVE BOX
INTERVAL HPI/OVERNIGHT EVENTS:  Pt is resting in the bed, NAD.    MEDICATIONS  (STANDING):  amLODIPine   Tablet 5 milliGRAM(s) Oral daily  apixaban 2.5 milliGRAM(s) Oral every 12 hours  aspirin  chewable 81 milliGRAM(s) Oral daily  mupirocin 2% Ointment 1 Application(s) Topical every 12 hours  tamsulosin 0.4 milliGRAM(s) Oral at bedtime    MEDICATIONS  (PRN):  atropine Injectable 0.5 milliGRAM(s) IntraMuscular once PRN hemodynamic instability  ondansetron Injectable 4 milliGRAM(s) IV Push every 6 hours PRN Nausea  senna 2 Tablet(s) Oral at bedtime PRN Constipation      Allergies    penicillins (Hives)  shellfish (Other)      ROS:  General: Pt denies recent weight loss/fever/chills    Neurological: denies numbness or  sensation loss    Cardiovascular: denies chest pain/palpitations/leg edema    Respiratory and Thorax: denies SOB/cough/wheezing    Gastrointestinal: denies abdominal pain/diarrhea/constipation/bloody stool    Genitourinary: denies urinary frequency/urgency/ dysuria    Musculoskeletal: denies joint pain or swelling, denies restricted motion    Hematologic: denies abnormal bleeding  	  Physical Exam:  Vital Signs Last 24 Hrs  T(C): 36.4 (07 Feb 2020 04:49), Max: 36.7 (07 Feb 2020 00:45)  T(F): 97.6 (07 Feb 2020 04:49), Max: 98.1 (07 Feb 2020 00:45)  HR: 60 (07 Feb 2020 07:00) (29 - 67)  BP: 140/68 (07 Feb 2020 04:41) (125/50 - 161/53)  BP(mean): 86 (07 Feb 2020 04:41) (70 - 113)  RR: 18 (07 Feb 2020 07:00) (10 - 20)  SpO2: 95% (07 Feb 2020 07:00) (94% - 100%)                 Constitutional: well developed, well nourished, no deformities and no acute distress    Neurological: Alert & Oriented x 3, LOZANO, no focal deficits    HEENT: NC/AT, PERRLA, EOMI,  Neck supple.    Respiratory: CTA B/L, No wheezing/crackles/rhonchi    Cardiovascular: (+) S1 & S2, RRR, No m/r/g    Gastrointestinal: soft, NT, nondistended, (+) BS    Genitourinary: non distended bladder, voiding freely    Extremities: No pedal edema, No clubbing, No cyanosis    Skin:  Lt infraclavicular PPM site : C/D/I, no hematoma (+)[facility]      LABS:                        12.1   7.36  )-----------( 168      ( 07 Feb 2020 06:56 )             36.4     02-07    142  |  111<H>  |  27<H>  ----------------------------<  120<H>  4.3   |  26  |  1.21    Ca    9.0      07 Feb 2020 06:56      RADIOLOGY & ADDITIONAL TESTS:    TELE: SR A-Vpaced    EKG: SR 60bpm A-Vpaced    CXR: PPM leads are in good position, no pneumothorax INTERVAL HPI/OVERNIGHT EVENTS:  Pt is resting in the bed, NAD.    MEDICATIONS  (STANDING):  amLODIPine   Tablet 5 milliGRAM(s) Oral daily  apixaban 2.5 milliGRAM(s) Oral every 12 hours  aspirin  chewable 81 milliGRAM(s) Oral daily  mupirocin 2% Ointment 1 Application(s) Topical every 12 hours  tamsulosin 0.4 milliGRAM(s) Oral at bedtime    MEDICATIONS  (PRN):  atropine Injectable 0.5 milliGRAM(s) IntraMuscular once PRN hemodynamic instability  ondansetron Injectable 4 milliGRAM(s) IV Push every 6 hours PRN Nausea  senna 2 Tablet(s) Oral at bedtime PRN Constipation      Allergies    penicillins (Hives)  shellfish (Other)      ROS:  General: Pt denies recent weight loss/fever/chills    Neurological: denies numbness or  sensation loss    Cardiovascular: denies chest pain/palpitations/leg edema    Respiratory and Thorax: denies SOB/cough/wheezing    Gastrointestinal: denies abdominal pain/diarrhea/constipation/bloody stool    Genitourinary: denies urinary frequency/urgency/ dysuria    Musculoskeletal: denies joint pain or swelling, denies restricted motion    Hematologic: denies abnormal bleeding  	  Physical Exam:  Vital Signs Last 24 Hrs  T(C): 36.4 (07 Feb 2020 04:49), Max: 36.7 (07 Feb 2020 00:45)  T(F): 97.6 (07 Feb 2020 04:49), Max: 98.1 (07 Feb 2020 00:45)  HR: 60 (07 Feb 2020 07:00) (29 - 67)  BP: 140/68 (07 Feb 2020 04:41) (125/50 - 161/53)  BP(mean): 86 (07 Feb 2020 04:41) (70 - 113)  RR: 18 (07 Feb 2020 07:00) (10 - 20)  SpO2: 95% (07 Feb 2020 07:00) (94% - 100%)                 Constitutional: well developed, well nourished, no deformities and no acute distress    Neurological: Alert & Oriented x 3, LOZANO, no focal deficits    HEENT: NC/AT, PERRLA, EOMI,  Neck supple.    Respiratory: CTA B/L, No wheezing/crackles/rhonchi    Cardiovascular: (+) S1 & S2, RRR, No m/r/g    Gastrointestinal: soft, NT, nondistended, (+) BS    Genitourinary: non distended bladder, voiding freely    Extremities: No pedal edema, No clubbing, No cyanosis    Skin:  Lt infraclavicular PPM site : C/D/I, no hematoma, (+) mild ecchymosis (+)Prineo      LABS:                        12.1   7.36  )-----------( 168      ( 07 Feb 2020 06:56 )             36.4     02-07    142  |  111<H>  |  27<H>  ----------------------------<  120<H>  4.3   |  26  |  1.21    Ca    9.0      07 Feb 2020 06:56      RADIOLOGY & ADDITIONAL TESTS:    TELE: SR A-Vpaced    EKG: SR 60bpm A-Vpaced    CXR: PPM leads are in good position, no pneumothorax

## 2020-02-07 NOTE — PROGRESS NOTE ADULT - PROBLEM SELECTOR PLAN 1
-resume eliquis today  -PPM interroagted today: normal functioning  -PPM booklet & temporary ID card given  -Post d/c & wound care instruction given to pt and all questions answered  -Follow up appointment made on  2/21/2020 @14:30 in EP clinic for wound check  - If pt develops chest pain/palpitations/shortness of breath/pain or swelling at procedure site, call EP clinic at [phone]. -resume eliquis today  -PPM interroagted today: normal functioning  -PPM booklet & temporary ID card given  -Post d/c & wound care instruction given to pt& family and all questions answered  -Follow up appointment made on  2/21/2020 @14:30 in EP clinic for wound check  - If pt develops chest pain/palpitations/shortness of breath/pain or swelling at procedure site, call EP clinic at [phone].

## 2020-02-07 NOTE — DISCHARGE NOTE PROVIDER - HOSPITAL COURSE
89 y/o male with PMHx of Afib on Eliquis, HTN, CKD, DM2, BPH,  PVD, and anemia presents to the ED with an asymptomatic heart block detected on Zeo. In the ED he was found to be bradycardic with HR in the 30's. Laboratory findings were significant for Negative troponin x 3 and a BNP of 1,169. He was given a one time dose of IM Atropine and initiated on IVF, then admitted to the floor with Bradycardia in the setting of Type 1 2nd degree Heart Block. TTE displayed findings suggestive of a Dilated Cardiomyopathy with a preserved EF of 70%. EP-Cardiology recommended PPM placement. Pt underwent an uncomplicated procedure and has been doing well. On the day of discharge the pt was found to be medically optimized and clinically stable for discharge home to follow up with their PCP, Dr. Suggs, and EP-Cardiologist, Dr. Allred, within a week, who will be notified of the pts hospital course.                 Vital Signs Last 24 Hrs    T(C): 36.2 (07 Feb 2020 09:05), Max: 36.7 (07 Feb 2020 00:45)    T(F): 97.1 (07 Feb 2020 09:05), Max: 98.1 (07 Feb 2020 00:45)    HR: 61 (07 Feb 2020 12:00) (60 - 76)    BP: 127/42 (07 Feb 2020 12:00) (115/48 - 161/53)    BP(mean): 65 (07 Feb 2020 12:00) (64 - 113)    RR: 18 (07 Feb 2020 12:00) (13 - 20)    SpO2: 100% (07 Feb 2020 12:00) (94% - 100%)            PHYSICAL EXAM:    Constitutional: NAD, awake and alert, frail-appearing    HEENT: PERR, EOMI, Normal Hearing, MMM    Neck: Soft and supple, No LAD, No JVD    Respiratory: Breath sounds are clear bilaterally, No wheezing, rales or rhonchi    Cardiovascular: S1 and S2, regular rate and rhythm, no Murmurs, gallops or rubs    Gastrointestinal: Bowel Sounds present, soft, nontender, nondistended, no guarding, no rebound    Extremities: No peripheral edema    Vascular: 2+ peripheral pulses    Neurological: A/O x 3, no focal deficits    Musculoskeletal: 5/5 strength b/l upper and lower extremities    Skin: No rashes                Labs:                    12.1     7.36  )-----------( 168      ( 07 Feb 2020 06:56 )               36.4         07 Feb 2020 06:56        142    |  111    |  27       ----------------------------<  120      4.3     |  26     |  1.21         Ca    9.0        07 Feb 2020 06:56        ECHO:     Left ventricle systolic function appears preserved in the presence of a     cardiac arrhythmia. Left ventricle size and structure are within normal     limitations. Visual estimation of left ventricle ejection fraction is 70%.     The left atriumis mildly dilated.     The right atrium appears mildly dilated.     The right ventricle is mildly dilated.     .     Fibrocalcific changes noted to the Aortic valve leaflets with preserved     leaflet excursion.     Trace aortic regurgitation is present.     The ascending aorta appears to be mildly dilated.     The sinotubular junction is mildly calcified.     Mild mitral annular calcification is present.     Fibrocalcific changes noted to the mitral valve leaflets with preserved     leaflet excursion.     Mild to Moderate mitral regurgitation is present.     The tricuspid valve leaflets are thin and pliable; valve motion is normal.     Mild to Moderate Tricuspid regurgitation is present.     Mild pulmonary hypertension.     Trace pulmonic valvular regurgitation is present.     No evidence of pericardial effusion.     No evidence of pleural effusion.     The IVC is dilated with decreased respiratory variation 88 y/o male with PMHx of Afib on Eliquis, HTN, CKD, DM2, BPH,  PVD, and anemia presents to the ED with an asymptomatic heart block detected on Zeo. In the ED he was found to be bradycardic with HR in the 30's. Laboratory findings were significant for Negative troponin x 3 and a BNP of 1,169. He was given a one time dose of IM Atropine and initiated on IVF, then admitted to the floor with Bradycardia due to high grade Heart Block. TTE displayed findings suggestive of a Dilated Cardiomyopathy with a preserved EF of 70%. EP-Cardiology recommended PPM placement. Pt underwent an uncomplicated procedure and has been doing well. On the day of discharge the pt was found to be medically optimized and clinically stable for discharge home to follow up with their PCP, Dr. Suggs, and EP-Cardiologist, Dr. Allred, within a week, who will be notified of the pts hospital course.                 Vital Signs Last 24 Hrs    T(C): 36.2 (07 Feb 2020 09:05), Max: 36.7 (07 Feb 2020 00:45)    T(F): 97.1 (07 Feb 2020 09:05), Max: 98.1 (07 Feb 2020 00:45)    HR: 61 (07 Feb 2020 12:00) (60 - 76)    BP: 127/42 (07 Feb 2020 12:00) (115/48 - 161/53)    BP(mean): 65 (07 Feb 2020 12:00) (64 - 113)    RR: 18 (07 Feb 2020 12:00) (13 - 20)    SpO2: 100% (07 Feb 2020 12:00) (94% - 100%)            PHYSICAL EXAM:    Constitutional: NAD, awake and alert, frail-appearing    HEENT: PERR, EOMI, Normal Hearing, MMM    Neck: Soft and supple, No LAD, No JVD    Respiratory: Breath sounds are clear bilaterally, No wheezing, rales or rhonchi    Cardiovascular: S1 and S2, regular rate and rhythm, no Murmurs, gallops or rubs    Gastrointestinal: Bowel Sounds present, soft, nontender, nondistended, no guarding, no rebound    Extremities: No peripheral edema    Vascular: 2+ peripheral pulses    Neurological: A/O x 3, no focal deficits    Musculoskeletal: 5/5 strength b/l upper and lower extremities    Skin: No rashes                Labs:                    12.1     7.36  )-----------( 168      ( 07 Feb 2020 06:56 )               36.4         07 Feb 2020 06:56        142    |  111    |  27       ----------------------------<  120      4.3     |  26     |  1.21         Ca    9.0        07 Feb 2020 06:56        ECHO:     Left ventricle systolic function appears preserved in the presence of a     cardiac arrhythmia. Left ventricle size and structure are within normal     limitations. Visual estimation of left ventricle ejection fraction is 70%.     The left atriumis mildly dilated.     The right atrium appears mildly dilated.     The right ventricle is mildly dilated.     .     Fibrocalcific changes noted to the Aortic valve leaflets with preserved     leaflet excursion.     Trace aortic regurgitation is present.     The ascending aorta appears to be mildly dilated.     The sinotubular junction is mildly calcified.     Mild mitral annular calcification is present.     Fibrocalcific changes noted to the mitral valve leaflets with preserved     leaflet excursion.     Mild to Moderate mitral regurgitation is present.     The tricuspid valve leaflets are thin and pliable; valve motion is normal.     Mild to Moderate Tricuspid regurgitation is present.     Mild pulmonary hypertension.     Trace pulmonic valvular regurgitation is present.     No evidence of pericardial effusion.     No evidence of pleural effusion.     The IVC is dilated with decreased respiratory variation

## 2020-02-07 NOTE — PROGRESS NOTE ADULT - PROVIDER SPECIALTY LIST ADULT
36 yo at 26w2d admitted to neuro for epistaxis, facial numbness and hearing loss
Electrophysiology
Family Medicine
Hospitalist
Hospitalist
36 yo at 26w3d admitted to neuro for epistaxis, facial numbness and hearing loss  - primary management per neurology  - f/u ENT recs regarding management of effusion - no Afrin in pregnancy  - we request that the primary team engages in direct discussion with pt's obstetrician prior to proceeding with any invasive testing such as lumbar puncture  - OB following, to perform fetal heart checks qshift and daily BPP  - f/u blood type
38 yo at 26w2d admitted to neuro for epistaxis, facial numbness and hearing loss  - primary management per neurology  - we request that the primary team engages in direct discussion with pt's obstetrician prior to proceeding with any invasive testing such as lumbar puncture  - OB following, to perform fetal heart checks qshift and daily BPP  - f/u blood type

## 2020-02-07 NOTE — PROGRESS NOTE ADULT - SUBJECTIVE AND OBJECTIVE BOX
HPI:  89 y/o male with PMHx of Afib on Eliquis, HTN, CKD, DM2, BPH,  PVD, and anemia presents to the ED from home after receiving a phone call from heart monitor company telling him to come to ED for evaluation. EMS reports heart block on EKG. He received a XioPatch heart monitor around 4 PM today when his doctor found him to be bradycardic and he refused to go to the ED. Pt denies SOB, chest pain, or fever. No prior episodes of dizziness, lightheadedness, nausea or vomiting. Former heavy smoker, quit in 1963. Currently drinks 2 glasses of wine daily, but also drinks vodka and beer too. Denies illicit drug use. Allergic to penicillins. (04 Feb 2020 23:02)        Subjective: Pt was seen and examined at bedside. Per the nurse, overnight, there were no acute events, the pt remained vitally stable. This morning the pt states that he "feels fine" and that he has never experienced any symptoms. On further review of systems, he denies chest pain, SOB, HA, diaphoresis . Remaining ROS was unremarkable. Pt heelign well sp PPM placement, with no further complaints or concerns.      Vital Signs Last 24 Hrs  T(C): 36.4 (07 Feb 2020 04:49), Max: 36.7 (07 Feb 2020 00:45)  T(F): 97.6 (07 Feb 2020 04:49), Max: 98.1 (07 Feb 2020 00:45)  HR: 60 (07 Feb 2020 07:00) (29 - 67)  BP: 140/68 (07 Feb 2020 04:41) (125/50 - 161/53)  BP(mean): 86 (07 Feb 2020 04:41) (70 - 113)  RR: 18 (07 Feb 2020 07:00) (10 - 20)  SpO2: 95% (07 Feb 2020 07:00) (94% - 100%)        PHYSICAL EXAM:    Constitutional: NAD, awake and alert, well-developed  HEENT: PERR, EOMI, Normal Hearing, MMM  Neck: Soft and supple, No LAD, No JVD  Respiratory: Breath sounds are clear bilaterally, No wheezing, rales or rhonchi  Cardiovascular: S1 and S2, regular rate and rhythm, no Murmurs, gallops or rubs  Gastrointestinal: Bowel Sounds present, soft, nontender, nondistended, no guarding, no rebound  Extremities: No peripheral edema  Vascular: 2+ peripheral pulses  Neurological: A/O x 3, no focal deficits  Musculoskeletal: 5/5 strength b/l upper and lower extremities  Skin: No rashes    MEDICATIONS  (STANDING):  amLODIPine   Tablet 5 milliGRAM(s) Oral daily  apixaban 2.5 milliGRAM(s) Oral every 12 hours  aspirin  chewable 81 milliGRAM(s) Oral daily  mupirocin 2% Ointment 1 Application(s) Topical every 12 hours  tamsulosin 0.4 milliGRAM(s) Oral at bedtime    MEDICATIONS  (PRN):  atropine Injectable 0.5 milliGRAM(s) IntraMuscular once PRN hemodynamic instability  ondansetron Injectable 4 milliGRAM(s) IV Push every 6 hours PRN Nausea  senna 2 Tablet(s) Oral at bedtime PRN Constipation      LABS: All Labs Reviewed:                      *          imaging studies:   < from: Transthoracic Echocardiogram (02.05.20 @ 10:52) >   Impression     Summary     Left ventricle systolic function appears preserved in the presence of a   cardiac arrhythmia. Left ventricle size and structure are within normal   limitations. Visual estimation of left ventricle ejection fraction is 70%.   The left atriumis mildly dilated.   The right atrium appears mildly dilated.   The right ventricle is mildly dilated.   .   Fibrocalcific changes noted to the Aortic valve leaflets with preserved   leaflet excursion.   Trace aortic regurgitation is present.   The ascending aorta appears to be mildly dilated.   The sinotubular junction is mildly calcified.   Mild mitral annular calcification is present.   Fibrocalcific changes noted to the mitral valve leaflets with preserved   leaflet excursion.   Mild to Moderate mitral regurgitation is present.   The tricuspid valve leaflets are thin and pliable; valve motion is normal.   Mild to Moderate Tricuspid regurgitation is present.   Mild pulmonary hypertension.   Trace pulmonic valvular regurgitation is present.   No evidence of pericardial effusion.   No evidence of pleural effusion.   The IVC is dilated with decreased respiratory variation HPI:  89 y/o male with PMHx of Afib on Eliquis, HTN, CKD, DM2, BPH,  PVD, and anemia presents to the ED from home after receiving a phone call from heart monitor company telling him to come to ED for evaluation. EMS reports heart block on EKG. He received a XioPatch heart monitor around 4 PM today when his doctor found him to be bradycardic and he refused to go to the ED. Pt denies SOB, chest pain, or fever. No prior episodes of dizziness, lightheadedness, nausea or vomiting. Former heavy smoker, quit in 1963. Currently drinks 2 glasses of wine daily, but also drinks vodka and beer too. Denies illicit drug use. Allergic to penicillins. (04 Feb 2020 23:02)        Subjective: Pt was seen and examined at bedside. Per the nurse, overnight, there were no acute events, the pt remained vitally stable. This morning the pt states that he "feels fine" and that he has never experienced any symptoms. On further review of systems, he denies chest pain, SOB, HA, diaphoresis . Remaining ROS was unremarkable. Pt heelign well sp PPM placement, with no further complaints or concerns.      Vital Signs Last 24 Hrs  T(C): 36.4 (07 Feb 2020 04:49), Max: 36.7 (07 Feb 2020 00:45)  T(F): 97.6 (07 Feb 2020 04:49), Max: 98.1 (07 Feb 2020 00:45)  HR: 60 (07 Feb 2020 07:00) (29 - 67)  BP: 140/68 (07 Feb 2020 04:41) (125/50 - 161/53)  BP(mean): 86 (07 Feb 2020 04:41) (70 - 113)  RR: 18 (07 Feb 2020 07:00) (10 - 20)  SpO2: 95% (07 Feb 2020 07:00) (94% - 100%)        PHYSICAL EXAM:    Constitutional: NAD, awake and alert, well-developed  HEENT: PERR, EOMI, Normal Hearing, MMM  Neck: Soft and supple, No LAD, No JVD  Respiratory: Breath sounds are clear bilaterally, No wheezing, rales or rhonchi  Cardiovascular: S1 and S2, regular rate and rhythm, no Murmurs, gallops or rubs  Gastrointestinal: Bowel Sounds present, soft, nontender, nondistended, no guarding, no rebound  Extremities: No peripheral edema  Vascular: 2+ peripheral pulses  Neurological: A/O x 3, no focal deficits  Musculoskeletal: 5/5 strength b/l upper and lower extremities  Skin: No rashes    MEDICATIONS  (STANDING):  amLODIPine   Tablet 5 milliGRAM(s) Oral daily  apixaban 2.5 milliGRAM(s) Oral every 12 hours  aspirin  chewable 81 milliGRAM(s) Oral daily  mupirocin 2% Ointment 1 Application(s) Topical every 12 hours  tamsulosin 0.4 milliGRAM(s) Oral at bedtime    MEDICATIONS  (PRN):  atropine Injectable 0.5 milliGRAM(s) IntraMuscular once PRN hemodynamic instability  ondansetron Injectable 4 milliGRAM(s) IV Push every 6 hours PRN Nausea  senna 2 Tablet(s) Oral at bedtime PRN Constipation      LABS: All Labs Reviewed:                                            12.1   7.36  )-----------( 168      ( 07 Feb 2020 06:56 )             36.4     07 Feb 2020 06:56    142    |  111    |  27     ----------------------------<  120    4.3     |  26     |  1.21     Ca    9.0        07 Feb 2020 06:56          CAPILLARY BLOOD GLUCOSE                    imaging studies:   < from: Transthoracic Echocardiogram (02.05.20 @ 10:52) >   Impression     Summary     Left ventricle systolic function appears preserved in the presence of a   cardiac arrhythmia. Left ventricle size and structure are within normal   limitations. Visual estimation of left ventricle ejection fraction is 70%.   The left atriumis mildly dilated.   The right atrium appears mildly dilated.   The right ventricle is mildly dilated.   .   Fibrocalcific changes noted to the Aortic valve leaflets with preserved   leaflet excursion.   Trace aortic regurgitation is present.   The ascending aorta appears to be mildly dilated.   The sinotubular junction is mildly calcified.   Mild mitral annular calcification is present.   Fibrocalcific changes noted to the mitral valve leaflets with preserved   leaflet excursion.   Mild to Moderate mitral regurgitation is present.   The tricuspid valve leaflets are thin and pliable; valve motion is normal.   Mild to Moderate Tricuspid regurgitation is present.   Mild pulmonary hypertension.   Trace pulmonic valvular regurgitation is present.   No evidence of pericardial effusion.   No evidence of pleural effusion.   The IVC is dilated with decreased respiratory variation

## 2020-02-07 NOTE — DISCHARGE NOTE PROVIDER - NSDCMRMEDTOKEN_GEN_ALL_CORE_FT
amLODIPine 5 mg oral tablet: 1 tab(s) orally once a day  apixaban 2.5 mg oral tablet: 1 tab(s) orally 2 times a day  tamsulosin 0.4 mg oral capsule: 1 cap(s) orally once a day

## 2020-02-07 NOTE — PROGRESS NOTE ADULT - ASSESSMENT
88 y/o male with PMHx of Afib on Eliquis, HTN, CKD, DM2, BPH,  PVD, and anemia presents to the ED with an asymptomatic heart block detected on Zeo. In the ED he was found to be bradycardic with HR in the 30's. Laboratory findings were significant for Negative troponin x 3 and a BNP of 1,169. He was given a one time dose of IM Atropine and initiated on IVF, then admitted to the floor with Bradycardia in the setting of Type 1 2nd degree Heart Block. TTE displayed findings suggestive of a Dilated Cardiomyopathy with a preserved EF of 70%. PPM was placed.     #Disposition  -Discharge Home today      #Bradycardia in the setting of  2nd degree Heart Block-Type 1  -s/p PPM placement   -EP: s/p uncomplicated PPM Placement  -ECHO: Dialted RA/LA/RV, LVEF 70%    #Normocytic Anemia  -pt currently asymptomatic  -trend cbc    #LUIS ALFREDO  -BUN: Cr- 45/1.72 baseline unknown  -Continue gentle IVF hydration with 100cc/hr NS  -trend kidney function    #Hx of AFIB  -hold all rate control medications, in light of bradycardia   -pt on eliquis    #BPH  -Continue tamsulosin    #Advanced Directives  -full code    #DVT ppx  -on eliquis

## 2020-02-07 NOTE — DISCHARGE NOTE PROVIDER - NSDCFUSCHEDAPPT_GEN_ALL_CORE_FT
TAVIA RAYMUNDO ; 02/21/2020 ; [facility] [address] T[address] ; 04/01/2020 ; [facility] Ne[facility] [address]

## 2020-02-07 NOTE — DISCHARGE NOTE PROVIDER - CARE PROVIDERS DIRECT ADDRESSES
[email or web address]

## 2020-02-07 NOTE — PROGRESS NOTE ADULT - ASSESSMENT
89 y/o male with h/o PAfib on eliquis, found to have CHB, not on any AV yesenia blockers.  s/p dual chamber PPM implant on 2/6/2020.  Post-op pt remains stable.

## 2020-02-10 PROBLEM — E11.9 TYPE 2 DIABETES MELLITUS WITHOUT COMPLICATIONS: Chronic | Status: ACTIVE | Noted: 2020-02-04

## 2020-02-10 PROBLEM — N39.0 URINARY TRACT INFECTION, SITE NOT SPECIFIED: Chronic | Status: ACTIVE | Noted: 2020-02-04

## 2020-02-10 PROBLEM — I48.91 UNSPECIFIED ATRIAL FIBRILLATION: Chronic | Status: ACTIVE | Noted: 2020-02-04

## 2020-02-10 PROBLEM — M48.00 SPINAL STENOSIS, SITE UNSPECIFIED: Chronic | Status: ACTIVE | Noted: 2020-02-04

## 2020-02-10 PROBLEM — N18.9 CHRONIC KIDNEY DISEASE, UNSPECIFIED: Chronic | Status: ACTIVE | Noted: 2020-02-04

## 2020-02-10 PROBLEM — I10 ESSENTIAL (PRIMARY) HYPERTENSION: Chronic | Status: ACTIVE | Noted: 2020-02-04

## 2020-02-10 PROBLEM — D64.9 ANEMIA, UNSPECIFIED: Chronic | Status: ACTIVE | Noted: 2020-02-04

## 2020-02-10 PROBLEM — M19.90 UNSPECIFIED OSTEOARTHRITIS, UNSPECIFIED SITE: Chronic | Status: ACTIVE | Noted: 2020-02-04

## 2020-02-10 PROBLEM — N40.0 BENIGN PROSTATIC HYPERPLASIA WITHOUT LOWER URINARY TRACT SYMPTOMS: Chronic | Status: ACTIVE | Noted: 2020-02-04

## 2020-02-10 PROBLEM — I73.9 PERIPHERAL VASCULAR DISEASE, UNSPECIFIED: Chronic | Status: ACTIVE | Noted: 2020-02-04

## 2020-02-10 PROBLEM — M10.9 GOUT, UNSPECIFIED: Chronic | Status: ACTIVE | Noted: 2020-02-04

## 2020-02-11 DIAGNOSIS — N18.4 CHRONIC KIDNEY DISEASE, STAGE 4 (SEVERE): ICD-10-CM

## 2020-02-11 DIAGNOSIS — I44.2 ATRIOVENTRICULAR BLOCK, COMPLETE: ICD-10-CM

## 2020-02-11 DIAGNOSIS — I42.0 DILATED CARDIOMYOPATHY: ICD-10-CM

## 2020-02-11 DIAGNOSIS — M10.9 GOUT, UNSPECIFIED: ICD-10-CM

## 2020-02-11 DIAGNOSIS — R00.1 BRADYCARDIA, UNSPECIFIED: ICD-10-CM

## 2020-02-11 DIAGNOSIS — E43 UNSPECIFIED SEVERE PROTEIN-CALORIE MALNUTRITION: ICD-10-CM

## 2020-02-11 DIAGNOSIS — D64.9 ANEMIA, UNSPECIFIED: ICD-10-CM

## 2020-02-11 DIAGNOSIS — I44.1 ATRIOVENTRICULAR BLOCK, SECOND DEGREE: ICD-10-CM

## 2020-02-11 DIAGNOSIS — E11.51 TYPE 2 DIABETES MELLITUS WITH DIABETIC PERIPHERAL ANGIOPATHY WITHOUT GANGRENE: ICD-10-CM

## 2020-02-11 DIAGNOSIS — I12.9 HYPERTENSIVE CHRONIC KIDNEY DISEASE WITH STAGE 1 THROUGH STAGE 4 CHRONIC KIDNEY DISEASE, OR UNSPECIFIED CHRONIC KIDNEY DISEASE: ICD-10-CM

## 2020-02-11 DIAGNOSIS — E11.22 TYPE 2 DIABETES MELLITUS WITH DIABETIC CHRONIC KIDNEY DISEASE: ICD-10-CM

## 2020-02-11 DIAGNOSIS — N40.0 BENIGN PROSTATIC HYPERPLASIA WITHOUT LOWER URINARY TRACT SYMPTOMS: ICD-10-CM

## 2020-02-11 DIAGNOSIS — I48.0 PAROXYSMAL ATRIAL FIBRILLATION: ICD-10-CM

## 2020-02-11 DIAGNOSIS — N17.9 ACUTE KIDNEY FAILURE, UNSPECIFIED: ICD-10-CM

## 2020-02-12 ENCOUNTER — APPOINTMENT (OUTPATIENT)
Dept: FAMILY MEDICINE | Facility: CLINIC | Age: 85
End: 2020-02-12
Payer: MEDICARE

## 2020-02-12 VITALS
HEART RATE: 79 BPM | HEIGHT: 64 IN | OXYGEN SATURATION: 98 % | RESPIRATION RATE: 16 BRPM | WEIGHT: 173 LBS | TEMPERATURE: 97.7 F | SYSTOLIC BLOOD PRESSURE: 146 MMHG | BODY MASS INDEX: 29.53 KG/M2 | DIASTOLIC BLOOD PRESSURE: 62 MMHG

## 2020-02-12 PROCEDURE — 99495 TRANSJ CARE MGMT MOD F2F 14D: CPT

## 2020-02-12 NOTE — ASSESSMENT
[FreeTextEntry1] : Pt is an 89 y/o M with PMH HTN, HLD, PVD, afib on Eliquis, CKD who presents today for post Hospital f/u found to be profoundly bradycardia with 2:1 AVB.\par Pacemaker placed 2/7\par Skin ecchymotic around pacemaker placement\par CKD; Renal panel improved.\par Last Creat 1.2 \par Follow up in 1 month

## 2020-02-12 NOTE — HISTORY OF PRESENT ILLNESS
[Post-hospitalization from ___ Hospital] : Post-hospitalization from [unfilled] Hospital [Admitted on: ___] : The patient was admitted on [unfilled] [Discharged on ___] : discharged on [unfilled] [Discharge Summary] : discharge summary [Patient Contacted By: ____] : and contacted by [unfilled] [FreeTextEntry2] : Pt is an 88 y/o M with PMH HTN, HLD, PVD, afib on Eliquis, CKD who presents today for post Hospital f/u found to be profoundly bradycardia with 2:1 AVB.\par Pacemaker placed 2/7\par \par TTE 02/2019 EF 77% mild-mod MR/TR, mild AI, mild-mod LVH\par Nuclear stress test 02/2019 normal myocardial perfusion\par Noted to be bradycardic in cardiologist office and sent to Hospital\par Pacemaker placed 2/7 and discharged home\par Follow up done by EPS \par . Has appointment with Dr Huang 2/24.\par Denies any complaints

## 2020-02-12 NOTE — PHYSICAL EXAM
[General Appearance - Well Developed] : well developed [Normal Appearance] : normal appearance [Well Groomed] : well groomed [General Appearance - Well Nourished] : well nourished [General Appearance - In No Acute Distress] : no acute distress [No Deformities] : no deformities [Normal Conjunctiva] : the conjunctiva exhibited no abnormalities [Eyelids - No Xanthelasma] : the eyelids demonstrated no xanthelasmas [Normal Oral Mucosa] : normal oral mucosa [No Oral Pallor] : no oral pallor [No Oral Cyanosis] : no oral cyanosis [Exaggerated Use Of Accessory Muscles For Inspiration] : no accessory muscle use [Respiration, Rhythm And Depth] : normal respiratory rhythm and effort [Auscultation Breath Sounds / Voice Sounds] : lungs were clear to auscultation bilaterally [Heart Sounds] : normal S1 and S2 [Arterial Pulses Normal] : the arterial pulses were normal [Murmurs] : no murmurs present [Bradycardic ___] : the heart rate was bradycardic at [unfilled] bpm [FreeTextEntry1] : b/l LE edema [Abdomen Soft] : soft [Abdomen Tenderness] : non-tender [Gait - Sufficient For Exercise Testing] : the gait was sufficient for exercise testing [Abnormal Walk] : normal gait [Abdomen Mass (___ Cm)] : no abdominal mass palpated [Nail Clubbing] : no clubbing of the fingernails [Petechial Hemorrhages (___cm)] : no petechial hemorrhages [Cyanosis, Localized] : no localized cyanosis [Impaired Insight] : insight and judgment were intact [Oriented To Time, Place, And Person] : oriented to person, place, and time [] : no ischemic changes [Affect] : the affect was normal [Mood] : the mood was normal [No Anxiety] : not feeling anxious

## 2020-02-14 ENCOUNTER — NON-APPOINTMENT (OUTPATIENT)
Age: 85
End: 2020-02-14

## 2020-02-14 ENCOUNTER — APPOINTMENT (OUTPATIENT)
Dept: CARDIOLOGY | Facility: CLINIC | Age: 85
End: 2020-02-14
Payer: MEDICARE

## 2020-02-14 VITALS
HEART RATE: 74 BPM | WEIGHT: 173 LBS | SYSTOLIC BLOOD PRESSURE: 140 MMHG | DIASTOLIC BLOOD PRESSURE: 58 MMHG | BODY MASS INDEX: 29.53 KG/M2 | OXYGEN SATURATION: 99 % | HEIGHT: 64 IN

## 2020-02-14 PROCEDURE — 93000 ELECTROCARDIOGRAM COMPLETE: CPT

## 2020-02-14 PROCEDURE — 99214 OFFICE O/P EST MOD 30 MIN: CPT | Mod: 25

## 2020-02-15 NOTE — REASON FOR VISIT
[Atrial Fibrillation] : atrial fibrillation [Follow-Up - Clinic] : a clinic follow-up of [FreeTextEntry1] : PPM

## 2020-02-15 NOTE — HISTORY OF PRESENT ILLNESS
[FreeTextEntry1] : Pt is a 88 y/o M who presents today for f/u. PMH afib on Eliquis, CKD, HTN, PVD, HLD.  He is now s/p PPM for CHB with Dr Allred.  He states that he has been feeling well and has no complaints today.  He is compliant with medications. \par TTE 02/2019 EF 77% mild-mod MR/TR, mild AI, mild-mod LVH\par Nuclear stress test 02/2019 normal myocardial perfusion\par \par Tchol 153\par HDL 66\par LDL 79\par PMH: PVD s/p peripheral stent RLE with Dr Singh, afib on Eliquis, ambulates with cane, HLD, HTN, colon cancer\par Smoking status: quit in 1963\par Current exercise: none\par Daily water intake: 50 oz\par Daily caffeine intake: 1-2 cups coffee\par OTC medications: ASA\par Family hx: pt denies any immediate family history cardiac disease\par Previous cardiac testing: unsure\par Previous hospitalizations: gout/cellulitis\par surgeries: colon cancer, partial nephrectomy 2006 benign mass\par

## 2020-02-15 NOTE — DISCUSSION/SUMMARY
[FreeTextEntry1] : Pt is a 88 y/o M who presents today for f/u. PMH afib on Eliquis, CKD, HTN, PVD, HLD.  He is now s/p PPM for CHB with Dr Allred.  He states that he has been feeling well and has no complaints today.  He is compliant with medications. \par TTE 02/2019 EF 77% mild-mod MR/TR, mild AI, mild-mod LVH\par Nuclear stress test 02/2019 normal myocardial perfusion\par doing well, c/w current meds\par The described plan was discussed with the pt and son.  All questions and concerns were addressed to the best of my knowledge. \par \par \par

## 2020-02-15 NOTE — PHYSICAL EXAM
[General Appearance - Well Developed] : well developed [Normal Appearance] : normal appearance [Well Groomed] : well groomed [General Appearance - Well Nourished] : well nourished [No Deformities] : no deformities [Eyelids - No Xanthelasma] : the eyelids demonstrated no xanthelasmas [Normal Conjunctiva] : the conjunctiva exhibited no abnormalities [General Appearance - In No Acute Distress] : no acute distress [No Oral Pallor] : no oral pallor [No Oral Cyanosis] : no oral cyanosis [Normal Oral Mucosa] : normal oral mucosa [Respiration, Rhythm And Depth] : normal respiratory rhythm and effort [Heart Sounds] : normal S1 and S2 [Exaggerated Use Of Accessory Muscles For Inspiration] : no accessory muscle use [Auscultation Breath Sounds / Voice Sounds] : lungs were clear to auscultation bilaterally [Murmurs] : no murmurs present [Arterial Pulses Normal] : the arterial pulses were normal [Bradycardic ___] : the heart rate was bradycardic at [unfilled] bpm [Abdomen Soft] : soft [Abdomen Tenderness] : non-tender [Abdomen Mass (___ Cm)] : no abdominal mass palpated [Abnormal Walk] : normal gait [Gait - Sufficient For Exercise Testing] : the gait was sufficient for exercise testing [Cyanosis, Localized] : no localized cyanosis [Nail Clubbing] : no clubbing of the fingernails [Petechial Hemorrhages (___cm)] : no petechial hemorrhages [] : no ischemic changes [Oriented To Time, Place, And Person] : oriented to person, place, and time [Impaired Insight] : insight and judgment were intact [Affect] : the affect was normal [Mood] : the mood was normal [No Anxiety] : not feeling anxious [FreeTextEntry1] : b/l LE edema

## 2020-02-21 ENCOUNTER — APPOINTMENT (OUTPATIENT)
Dept: ELECTROPHYSIOLOGY | Facility: CLINIC | Age: 85
End: 2020-02-21
Payer: MEDICARE

## 2020-02-21 VITALS — BODY MASS INDEX: 28.85 KG/M2 | HEART RATE: 73 BPM | HEIGHT: 64 IN | WEIGHT: 169 LBS | OXYGEN SATURATION: 99 %

## 2020-02-21 VITALS — DIASTOLIC BLOOD PRESSURE: 68 MMHG | SYSTOLIC BLOOD PRESSURE: 141 MMHG

## 2020-02-21 PROCEDURE — 99024 POSTOP FOLLOW-UP VISIT: CPT

## 2020-02-21 PROCEDURE — 93280 PM DEVICE PROGR EVAL DUAL: CPT

## 2020-02-27 PROCEDURE — 93228 REMOTE 30 DAY ECG REV/REPORT: CPT

## 2020-03-12 ENCOUNTER — APPOINTMENT (OUTPATIENT)
Dept: FAMILY MEDICINE | Facility: CLINIC | Age: 85
End: 2020-03-12
Payer: MEDICARE

## 2020-03-12 VITALS
HEIGHT: 64 IN | DIASTOLIC BLOOD PRESSURE: 60 MMHG | OXYGEN SATURATION: 99 % | HEART RATE: 74 BPM | TEMPERATURE: 97.7 F | SYSTOLIC BLOOD PRESSURE: 140 MMHG | BODY MASS INDEX: 28.51 KG/M2 | WEIGHT: 167 LBS | RESPIRATION RATE: 16 BRPM

## 2020-03-12 PROCEDURE — 99214 OFFICE O/P EST MOD 30 MIN: CPT

## 2020-03-12 NOTE — COUNSELING
[AUDIT-C Screening administered and reviewed] : AUDIT-C Screening administered and reviewed [Hazards of at-risk alcohol use discussed] : Hazards of at-risk alcohol use discussed [Strategies to reduce or eliminate alcohol use discussed] : Strategies to reduce or eliminate alcohol use discussed [FreeTextEntry2] : reduce frequency to 2-3 times a week

## 2020-03-12 NOTE — HISTORY OF PRESENT ILLNESS
[FreeTextEntry1] : follow up [de-identified] : 88 yr old male is here for follow up\par Hx of Afib on Eliquis\par CHB: s/p Pacemaker. Site healed well\par Anemia: iron deficiency, recent labs 1/20\par CKD last labs creat 1.2, has improved.\par Hx of partial nephrectomy for benign tumor.  Nephrologist follow up in 2 days.\par Does not want dialysis if it gets worse\par PVD asymptomatic. Seen by cardiologist Dr Marquez 2/2020

## 2020-03-12 NOTE — ASSESSMENT
[FreeTextEntry1] : Pt is an 88 y/o M with PMH HTN, HLD, PVD, afib on Eliquis, CKD who presents today for  f/u \par CHB: found to be profoundly bradycardia with 2:1 AVB.\par Pacemaker placed 2/7\par CKD; Renal panel improved.\par Last Creat 1.2 \par Follow up in 2 months\par PVD/ A FiB: Atorvastatin 10 mg\par Recheck labs in 2 months

## 2020-03-12 NOTE — PHYSICAL EXAM
[General Appearance - Well Developed] : well developed [Normal Appearance] : normal appearance [Well Groomed] : well groomed [General Appearance - Well Nourished] : well nourished [No Deformities] : no deformities [General Appearance - In No Acute Distress] : no acute distress [Normal Conjunctiva] : the conjunctiva exhibited no abnormalities [Eyelids - No Xanthelasma] : the eyelids demonstrated no xanthelasmas [Normal Oral Mucosa] : normal oral mucosa [No Oral Pallor] : no oral pallor [No Oral Cyanosis] : no oral cyanosis [Respiration, Rhythm And Depth] : normal respiratory rhythm and effort [Exaggerated Use Of Accessory Muscles For Inspiration] : no accessory muscle use [Auscultation Breath Sounds / Voice Sounds] : lungs were clear to auscultation bilaterally [Heart Sounds] : normal S1 and S2 [Murmurs] : no murmurs present [Arterial Pulses Normal] : the arterial pulses were normal [Bradycardic ___] : the heart rate was bradycardic at [unfilled] bpm [Abdomen Soft] : soft [Abdomen Tenderness] : non-tender [Abdomen Mass (___ Cm)] : no abdominal mass palpated [Abnormal Walk] : normal gait [Gait - Sufficient For Exercise Testing] : the gait was sufficient for exercise testing [Nail Clubbing] : no clubbing of the fingernails [Cyanosis, Localized] : no localized cyanosis [Petechial Hemorrhages (___cm)] : no petechial hemorrhages [] : no ischemic changes [Oriented To Time, Place, And Person] : oriented to person, place, and time [Impaired Insight] : insight and judgment were intact [Affect] : the affect was normal [Mood] : the mood was normal [No Anxiety] : not feeling anxious [FreeTextEntry1] : b/l LE edema

## 2020-03-24 ENCOUNTER — NON-APPOINTMENT (OUTPATIENT)
Age: 85
End: 2020-03-24

## 2020-06-10 ENCOUNTER — APPOINTMENT (OUTPATIENT)
Dept: NEPHROLOGY | Facility: CLINIC | Age: 85
End: 2020-06-10
Payer: MEDICARE

## 2020-06-10 VITALS
HEIGHT: 64 IN | TEMPERATURE: 98.4 F | HEART RATE: 70 BPM | DIASTOLIC BLOOD PRESSURE: 60 MMHG | SYSTOLIC BLOOD PRESSURE: 110 MMHG | WEIGHT: 164 LBS | OXYGEN SATURATION: 98 % | BODY MASS INDEX: 28 KG/M2

## 2020-06-10 PROCEDURE — 99215 OFFICE O/P EST HI 40 MIN: CPT

## 2020-06-10 NOTE — PHYSICAL EXAM
[General Appearance - Alert] : alert [General Appearance - In No Acute Distress] : in no acute distress [Sclera] : the sclera and conjunctiva were normal [Strabismus] : no strabismus was seen [Outer Ear] : the ears and nose were normal in appearance [Neck Appearance] : the appearance of the neck was normal [Neck Cervical Mass (___cm)] : no neck mass was observed [] : no respiratory distress [Respiration, Rhythm And Depth] : normal respiratory rhythm and effort [Auscultation Breath Sounds / Voice Sounds] : lungs were clear to auscultation bilaterally [Heart Rate And Rhythm] : heart rate was normal and rhythm regular [Heart Sounds] : normal S1 and S2 [Edema] : there was no peripheral edema [Bowel Sounds] : normal bowel sounds [Abdomen Soft] : soft [Abdomen Tenderness] : non-tender [No CVA Tenderness] : no ~M costovertebral angle tenderness [Skin Color & Pigmentation] : normal skin color and pigmentation [Oriented To Time, Place, And Person] : oriented to person, place, and time [FreeTextEntry1] : ppm in left chest wall [No Focal Deficits] : no focal deficits [Affect] : the affect was normal [Mood] : the mood was normal

## 2020-06-10 NOTE — HISTORY OF PRESENT ILLNESS
[FreeTextEntry1] : Patient is an 89 year old male with past medical history of BPH, HTN, Gout, Pre-Diabetic, benign R sided renal mass s/p partial nephrectomy in 2006, here for follow up of CKD.\par Last seen in January.\par \par Today, \par Pt presents for follow up; feels good. No new complaint.\par Had ppm placement on 02/06 in [facility]\par \par \par \par \par \par

## 2020-06-10 NOTE — ASSESSMENT
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

## 2020-06-10 NOTE — REVIEW OF SYSTEMS
[Negative] : Genitourinary [Eye Pain] : no eye pain [Red Eyes] : eyes not red [Earache] : no earache [Nosebleeds] : no nosebleeds [Chest Pain] : no chest pain [Skin Lesions] : skin lesion [Dizziness] : no dizziness [Fainting] : no fainting [Depression] : no depression [Anxiety] : no anxiety [Muscle Weakness] : no muscle weakness

## 2020-06-15 ENCOUNTER — APPOINTMENT (OUTPATIENT)
Dept: CARDIOLOGY | Facility: CLINIC | Age: 85
End: 2020-06-15
Payer: MEDICARE

## 2020-06-15 ENCOUNTER — NON-APPOINTMENT (OUTPATIENT)
Age: 85
End: 2020-06-15

## 2020-06-15 VITALS
HEIGHT: 64 IN | BODY MASS INDEX: 27.66 KG/M2 | WEIGHT: 162 LBS | SYSTOLIC BLOOD PRESSURE: 120 MMHG | DIASTOLIC BLOOD PRESSURE: 50 MMHG

## 2020-06-15 LAB
25(OH)D3 SERPL-MCNC: 50.8 NG/ML
ALBUMIN SERPL ELPH-MCNC: 4.3 G/DL
ANION GAP SERPL CALC-SCNC: 15 MMOL/L
APPEARANCE: CLEAR
BACTERIA: NEGATIVE
BASOPHILS # BLD AUTO: 0.04 K/UL
BASOPHILS NFR BLD AUTO: 0.6 %
BILIRUBIN URINE: NEGATIVE
BLOOD URINE: NEGATIVE
BUN SERPL-MCNC: 41 MG/DL
CALCIUM SERPL-MCNC: 9.5 MG/DL
CALCIUM SERPL-MCNC: 9.5 MG/DL
CHLORIDE SERPL-SCNC: 101 MMOL/L
CO2 SERPL-SCNC: 27 MMOL/L
COLOR: COLORLESS
CREAT SERPL-MCNC: 1.85 MG/DL
CREAT SPEC-SCNC: 31 MG/DL
CREAT/PROT UR: 0.1 RATIO
EOSINOPHIL # BLD AUTO: 0.08 K/UL
EOSINOPHIL NFR BLD AUTO: 1.2 %
GLUCOSE QUALITATIVE U: NEGATIVE
GLUCOSE SERPL-MCNC: 123 MG/DL
HCT VFR BLD CALC: 40.1 %
HGB BLD-MCNC: 12.7 G/DL
HYALINE CASTS: 1 /LPF
IMM GRANULOCYTES NFR BLD AUTO: 0.5 %
KETONES URINE: NEGATIVE
LEUKOCYTE ESTERASE URINE: ABNORMAL
LYMPHOCYTES # BLD AUTO: 1.05 K/UL
LYMPHOCYTES NFR BLD AUTO: 15.8 %
MAN DIFF?: NORMAL
MCHC RBC-ENTMCNC: 31.7 GM/DL
MCHC RBC-ENTMCNC: 31.9 PG
MCV RBC AUTO: 100.8 FL
MICROSCOPIC-UA: NORMAL
MONOCYTES # BLD AUTO: 0.47 K/UL
MONOCYTES NFR BLD AUTO: 7.1 %
NEUTROPHILS # BLD AUTO: 4.96 K/UL
NEUTROPHILS NFR BLD AUTO: 74.8 %
NITRITE URINE: NEGATIVE
PARATHYROID HORMONE INTACT: 97 PG/ML
PH URINE: 6
PHOSPHATE SERPL-MCNC: 3.4 MG/DL
PLATELET # BLD AUTO: 183 K/UL
POTASSIUM SERPL-SCNC: 4.2 MMOL/L
PROT UR-MCNC: 4 MG/DL
PROTEIN URINE: NEGATIVE
RBC # BLD: 3.98 M/UL
RBC # FLD: 15.9 %
RED BLOOD CELLS URINE: 1 /HPF
SODIUM SERPL-SCNC: 142 MMOL/L
SPECIFIC GRAVITY URINE: 1.01
SQUAMOUS EPITHELIAL CELLS: 1 /HPF
UROBILINOGEN URINE: NORMAL
WBC # FLD AUTO: 6.63 K/UL
WHITE BLOOD CELLS URINE: 8 /HPF

## 2020-06-15 PROCEDURE — 99213 OFFICE O/P EST LOW 20 MIN: CPT | Mod: 25

## 2020-06-15 PROCEDURE — 93000 ELECTROCARDIOGRAM COMPLETE: CPT

## 2020-06-15 NOTE — DISCUSSION/SUMMARY
[FreeTextEntry1] : Pt is a 89 y/o M who presents today for f/u. PMH afib on Eliquis, CKD, HTN, PVD, HLD.  He is now s/p PPM for CHB with Dr Allred.  He states that he has been feeling well and has no complaints today.  He is compliant with medications. \par TTE 02/2019 EF 77% mild-mod MR/TR, mild AI, mild-mod LVH\par Nuclear stress test 02/2019 normal myocardial perfusion\par doing well, c/w current meds\par Pt will return in 6 mnths or sooner as needed but I encouraged communication via phone or portal if necessary. \par The described plan was discussed with the pt and son.  All questions and concerns were addressed to the best of my knowledge. \par \par \par

## 2020-06-15 NOTE — REVIEW OF SYSTEMS
[see HPI] : see HPI [Negative] : Heme/Lymph [Shortness Of Breath] : no shortness of breath [Dyspnea on exertion] : not dyspnea during exertion [Lower Ext Edema] : no extremity edema [Chest Pain] : no chest pain [Chest  Pressure] : no chest pressure [Leg Claudication] : no intermittent leg claudication [Palpitations] : no palpitations

## 2020-06-15 NOTE — REASON FOR VISIT
[Follow-Up - Clinic] : a clinic follow-up of [Atrial Fibrillation] : atrial fibrillation [FreeTextEntry1] : PPM

## 2020-06-15 NOTE — PHYSICAL EXAM
[Normal Appearance] : normal appearance [General Appearance - Well Developed] : well developed [General Appearance - Well Nourished] : well nourished [No Deformities] : no deformities [Well Groomed] : well groomed [General Appearance - In No Acute Distress] : no acute distress [Eyelids - No Xanthelasma] : the eyelids demonstrated no xanthelasmas [Normal Conjunctiva] : the conjunctiva exhibited no abnormalities [No Oral Cyanosis] : no oral cyanosis [No Oral Pallor] : no oral pallor [Normal Oral Mucosa] : normal oral mucosa [Respiration, Rhythm And Depth] : normal respiratory rhythm and effort [Exaggerated Use Of Accessory Muscles For Inspiration] : no accessory muscle use [Auscultation Breath Sounds / Voice Sounds] : lungs were clear to auscultation bilaterally [Murmurs] : no murmurs present [Heart Sounds] : normal S1 and S2 [Bradycardic ___] : the heart rate was bradycardic at [unfilled] bpm [Arterial Pulses Normal] : the arterial pulses were normal [Abdomen Soft] : soft [Abdomen Tenderness] : non-tender [Abdomen Mass (___ Cm)] : no abdominal mass palpated [Abnormal Walk] : normal gait [Nail Clubbing] : no clubbing of the fingernails [Gait - Sufficient For Exercise Testing] : the gait was sufficient for exercise testing [Petechial Hemorrhages (___cm)] : no petechial hemorrhages [Cyanosis, Localized] : no localized cyanosis [] : no ischemic changes [Oriented To Time, Place, And Person] : oriented to person, place, and time [Impaired Insight] : insight and judgment were intact [Affect] : the affect was normal [Mood] : the mood was normal [No Anxiety] : not feeling anxious [FreeTextEntry1] : b/l LE edema

## 2020-06-17 ENCOUNTER — APPOINTMENT (OUTPATIENT)
Dept: FAMILY MEDICINE | Facility: CLINIC | Age: 85
End: 2020-06-17
Payer: MEDICARE

## 2020-06-17 VITALS
BODY MASS INDEX: 28.17 KG/M2 | HEART RATE: 70 BPM | HEIGHT: 64 IN | WEIGHT: 165 LBS | SYSTOLIC BLOOD PRESSURE: 118 MMHG | RESPIRATION RATE: 16 BRPM | OXYGEN SATURATION: 99 % | DIASTOLIC BLOOD PRESSURE: 60 MMHG

## 2020-06-17 DIAGNOSIS — Z23 ENCOUNTER FOR IMMUNIZATION: ICD-10-CM

## 2020-06-17 PROCEDURE — G0009: CPT

## 2020-06-17 PROCEDURE — 99214 OFFICE O/P EST MOD 30 MIN: CPT | Mod: 25

## 2020-06-17 PROCEDURE — 90732 PPSV23 VACC 2 YRS+ SUBQ/IM: CPT

## 2020-06-17 RX ORDER — GLUC/MSM/COLGN2/HYAL/ANTIARTH3 375-375-20
TABLET ORAL DAILY
Refills: 0 | Status: DISCONTINUED | COMMUNITY
End: 2020-06-17

## 2020-06-17 NOTE — HISTORY OF PRESENT ILLNESS
[FreeTextEntry1] : 2 months fu  [de-identified] : 88 yr old male is here for follow up\par Hx of Afib on Eliquis\par CHB: s/p Pacemaker. \par Anemia: iron deficiency, recent labs 6/20 by nephrologist Iron slightly low. Takes OTC. WIll check dose\par CKD last labs creat 1.8, has improved.\par Hx of partial nephrectomy for benign tumor.  Nephrologist follow 6/10\par Does not want dialysis if it gets worse\par PVD asymptomatic. Seen by cardiologist Dr Marquez 2/2020, has follow up next week.

## 2020-06-17 NOTE — PHYSICAL EXAM
[General Appearance - Well Developed] : well developed [Normal Appearance] : normal appearance [General Appearance - Well Nourished] : well nourished [Well Groomed] : well groomed [Normal Conjunctiva] : the conjunctiva exhibited no abnormalities [General Appearance - In No Acute Distress] : no acute distress [No Deformities] : no deformities [Eyelids - No Xanthelasma] : the eyelids demonstrated no xanthelasmas [Normal Oral Mucosa] : normal oral mucosa [No Oral Cyanosis] : no oral cyanosis [No Oral Pallor] : no oral pallor [Respiration, Rhythm And Depth] : normal respiratory rhythm and effort [Exaggerated Use Of Accessory Muscles For Inspiration] : no accessory muscle use [Murmurs] : no murmurs present [Auscultation Breath Sounds / Voice Sounds] : lungs were clear to auscultation bilaterally [Heart Sounds] : normal S1 and S2 [Bradycardic ___] : the heart rate was bradycardic at [unfilled] bpm [Arterial Pulses Normal] : the arterial pulses were normal [Abdomen Soft] : soft [Abdomen Tenderness] : non-tender [Abnormal Walk] : normal gait [Abdomen Mass (___ Cm)] : no abdominal mass palpated [Gait - Sufficient For Exercise Testing] : the gait was sufficient for exercise testing [Nail Clubbing] : no clubbing of the fingernails [] : no ischemic changes [Petechial Hemorrhages (___cm)] : no petechial hemorrhages [Cyanosis, Localized] : no localized cyanosis [Mood] : the mood was normal [Impaired Insight] : insight and judgment were intact [Oriented To Time, Place, And Person] : oriented to person, place, and time [Affect] : the affect was normal [No Anxiety] : not feeling anxious [FreeTextEntry1] : b/l LE edema

## 2020-06-17 NOTE — ASSESSMENT
[FreeTextEntry1] : Pt is an 89 y/o M with PMH HTN, HLD, PVD, afib on Eliquis, CKD who presents today for  f/u \par Hx of Afib on Eliquis\par CHB: s/p Pacemaker. \par Anemia: iron deficiency, recent labs 6/20 by nephrologist Iron slightly low. Takes OTC. WIll check dose\par CKD last labs creat 1.8, has improved.\par Hx of partial nephrectomy for benign tumor.  Nephrologist follow 6/10/2020\par

## 2020-06-18 NOTE — PRE-OP CHECKLIST - ORDERS/MEDICATION ADMINISTRATION RECORD ON CHART
Reviewed plan of care with pt at the beginning of the shift. Pt reported pain and cough throughout the shift. PRN given. Pt reported no n/v throughout the shift. Vital signs were stable throughout the shift.Fall precautions continued for pt. Bed locked and at lowest position. Call light within reach. Pt knows to call if assistance is needed.       done

## 2020-06-24 ENCOUNTER — APPOINTMENT (OUTPATIENT)
Dept: ELECTROPHYSIOLOGY | Facility: CLINIC | Age: 85
End: 2020-06-24
Payer: MEDICARE

## 2020-06-24 VITALS
BODY MASS INDEX: 27.83 KG/M2 | SYSTOLIC BLOOD PRESSURE: 131 MMHG | TEMPERATURE: 99 F | HEIGHT: 64 IN | DIASTOLIC BLOOD PRESSURE: 52 MMHG | OXYGEN SATURATION: 99 % | HEART RATE: 70 BPM | WEIGHT: 163 LBS

## 2020-06-24 PROCEDURE — 93280 PM DEVICE PROGR EVAL DUAL: CPT

## 2020-06-26 ENCOUNTER — TRANSCRIPTION ENCOUNTER (OUTPATIENT)
Age: 85
End: 2020-06-26

## 2020-07-02 ENCOUNTER — APPOINTMENT (OUTPATIENT)
Dept: FAMILY MEDICINE | Facility: CLINIC | Age: 85
End: 2020-07-02
Payer: MEDICARE

## 2020-07-02 VITALS
DIASTOLIC BLOOD PRESSURE: 58 MMHG | WEIGHT: 161 LBS | TEMPERATURE: 98 F | OXYGEN SATURATION: 98 % | HEART RATE: 70 BPM | HEIGHT: 64 IN | SYSTOLIC BLOOD PRESSURE: 130 MMHG | BODY MASS INDEX: 27.49 KG/M2

## 2020-07-02 DIAGNOSIS — Z76.89 PERSONS ENCOUNTERING HEALTH SERVICES IN OTHER SPECIFIED CIRCUMSTANCES: ICD-10-CM

## 2020-07-02 PROCEDURE — 99213 OFFICE O/P EST LOW 20 MIN: CPT | Mod: 25

## 2020-07-02 PROCEDURE — 11102 TANGNTL BX SKIN SINGLE LES: CPT

## 2020-07-02 NOTE — HISTORY OF PRESENT ILLNESS
[FreeTextEntry1] : Biopsy [de-identified] : Lesion in the scalp behind the ear for many years, the last few months has noticed some crusting and break down of the skin with some discoloration. He is requesting excision of lesion.

## 2020-07-02 NOTE — ASSESSMENT
[FreeTextEntry1] : Ca Skin\par Excision done under aseptic precautions with 2 5 lidocaine as anesthetic.\par Hemostasis achieved. Pressure dressing\par To be kept for 48 hrs.\par Follow up in 3 days\par Levaquin Rx given\par Specimen sent for bx.\par \par

## 2020-07-02 NOTE — PHYSICAL EXAM
[No Acute Distress] : no acute distress [Normal S1, S2] : normal S1 and S2 [Normal Sclera/Conjunctiva] : normal sclera/conjunctiva [Normal] : no respiratory distress, lungs were clear to auscultation bilaterally and no accessory muscle use [Normal Rate] : normal rate  [de-identified] : lesion about 2 cm in size on scalp behind the right ear lobe

## 2020-07-07 ENCOUNTER — APPOINTMENT (OUTPATIENT)
Dept: FAMILY MEDICINE | Facility: CLINIC | Age: 85
End: 2020-07-07
Payer: MEDICARE

## 2020-07-07 VITALS
SYSTOLIC BLOOD PRESSURE: 126 MMHG | RESPIRATION RATE: 15 BRPM | OXYGEN SATURATION: 98 % | HEART RATE: 75 BPM | DIASTOLIC BLOOD PRESSURE: 60 MMHG | TEMPERATURE: 97.6 F

## 2020-07-07 PROCEDURE — 99213 OFFICE O/P EST LOW 20 MIN: CPT

## 2020-07-07 NOTE — ASSESSMENT
[FreeTextEntry1] : s/p Bx\par Here for Follow up\par Path not available\par Saline wet to dry\par No dressing\par Keep area clean and dry

## 2020-07-07 NOTE — PHYSICAL EXAM
[Normal] : no acute distress, well nourished, well developed and well-appearing [Normal Sclera/Conjunctiva] : normal sclera/conjunctiva [de-identified] : site of excision clear healing well

## 2020-07-08 DIAGNOSIS — L98.9 DISORDER OF THE SKIN AND SUBCUTANEOUS TISSUE, UNSPECIFIED: ICD-10-CM

## 2020-07-08 LAB — CORE LAB BIOPSY: NORMAL

## 2020-07-20 ENCOUNTER — APPOINTMENT (OUTPATIENT)
Dept: DERMATOLOGY | Facility: CLINIC | Age: 85
End: 2020-07-20

## 2020-07-22 ENCOUNTER — RX RENEWAL (OUTPATIENT)
Age: 85
End: 2020-07-22

## 2020-08-11 ENCOUNTER — APPOINTMENT (OUTPATIENT)
Dept: DERMATOLOGY | Facility: CLINIC | Age: 85
End: 2020-08-11
Payer: MEDICARE

## 2020-08-11 PROCEDURE — 14021 TIS TRNFR S/A/L 10.1-30 SQCM: CPT

## 2020-08-18 ENCOUNTER — APPOINTMENT (OUTPATIENT)
Dept: DERMATOLOGY | Facility: CLINIC | Age: 85
End: 2020-08-18
Payer: MEDICARE

## 2020-08-18 LAB — CORE LAB BIOPSY: NORMAL

## 2020-08-18 PROCEDURE — 17273 DSTR MAL LES S/N/H/F/G 2.1-3: CPT | Mod: 79

## 2020-08-18 NOTE — ASSESSMENT
[FreeTextEntry1] : BCC\par s/p excision\par margins positive\par picture did not save in EMR\par tx options discussed; offered Mohs as tx of choice, patient defers\par given lack of clinical BCC evident, discussed D&C and monitored but discussed risks including incomplete tx and risk of recrurrence\par patient prefers D&C and monitor site; if any recurrence, will need mohs\par \par Risks and benefits were discussed including scarring, bleeding, pain, incomplete treatment and recurrence. Discussed excision and/or Mohs offer more complete treatment. Patient verbalized understanding and prefers to have D&C done. \par \par 2% lido with epi, desiccation and curettage x 3 cycles;  hemostasis with Monsel's solution, bandaid and vaseline applied, wound care instructions reviewed.\par \par Size:  2.2 cm\par Location right posterior scalp\par

## 2020-08-18 NOTE — PHYSICAL EXAM
[FreeTextEntry3] : AAOx3, pleasant, NAD, no visual lymphadenopathy\par hair, scalp, face, nose, eyelids, ears, lips, oropharynx, neck, chest, abdomen, back, right arm, left arm, nails, and hands examined with all normal findings,\par pertinent findings include:\par \par well healing excision site with no visibly clinical BCC

## 2020-08-18 NOTE — HISTORY OF PRESENT ILLNESS
[FreeTextEntry1] : f/u BCC [de-identified] : 89 year old male here for f/u BCC. s/p excision. margins were positive.

## 2020-08-28 ENCOUNTER — RX RENEWAL (OUTPATIENT)
Age: 85
End: 2020-08-28

## 2020-08-31 ENCOUNTER — APPOINTMENT (OUTPATIENT)
Dept: DERMATOLOGY | Facility: CLINIC | Age: 85
End: 2020-08-31

## 2020-09-24 ENCOUNTER — APPOINTMENT (OUTPATIENT)
Dept: ELECTROPHYSIOLOGY | Facility: CLINIC | Age: 85
End: 2020-09-24
Payer: MEDICARE

## 2020-09-24 PROCEDURE — 93294 REM INTERROG EVL PM/LDLS PM: CPT

## 2020-09-24 PROCEDURE — 93296 REM INTERROG EVL PM/IDS: CPT

## 2020-10-13 ENCOUNTER — APPOINTMENT (OUTPATIENT)
Dept: FAMILY MEDICINE | Facility: CLINIC | Age: 85
End: 2020-10-13
Payer: MEDICARE

## 2020-10-13 VITALS
BODY MASS INDEX: 29.71 KG/M2 | WEIGHT: 174 LBS | DIASTOLIC BLOOD PRESSURE: 60 MMHG | TEMPERATURE: 98.2 F | RESPIRATION RATE: 15 BRPM | HEIGHT: 64 IN | SYSTOLIC BLOOD PRESSURE: 128 MMHG | HEART RATE: 80 BPM | OXYGEN SATURATION: 98 %

## 2020-10-13 PROCEDURE — G0008: CPT

## 2020-10-13 PROCEDURE — 36415 COLL VENOUS BLD VENIPUNCTURE: CPT

## 2020-10-13 PROCEDURE — 99214 OFFICE O/P EST MOD 30 MIN: CPT | Mod: 25

## 2020-10-13 PROCEDURE — 90662 IIV NO PRSV INCREASED AG IM: CPT

## 2020-10-13 RX ORDER — LEVOFLOXACIN 250 MG/1
250 TABLET, FILM COATED ORAL DAILY
Qty: 7 | Refills: 0 | Status: DISCONTINUED | COMMUNITY
Start: 2020-07-02 | End: 2020-07-13

## 2020-10-13 NOTE — ASSESSMENT
[FreeTextEntry1] : Pt is an 89 y/o M with PMH HTN, HLD, PVD, afib on Eliquis, CKD who presents today for  f/u \par Hx of Afib on Eliquis\par CHB: s/p Pacemaker. \par Anemia: iron deficiency, recent labs 6/20 by nephrologist Iron slightly low. Takes OTC. WIll check dose\par CKD last labs creat 1.8, has improved.\par Hx of partial nephrectomy for benign tumor.  Nephrologist follow 6/10/2020\par Follow up with Derm Skin cancer\par Flu vaccine administered

## 2020-10-13 NOTE — PHYSICAL EXAM
[Normal] : no acute distress, well nourished, well developed and well-appearing [Normal Sclera/Conjunctiva] : normal sclera/conjunctiva [de-identified] : site of excision clear healing well

## 2020-10-13 NOTE — HISTORY OF PRESENT ILLNESS
[FreeTextEntry1] : follow up [de-identified] : 88 yr old male is here for follow up\par Hx of Afib on Eliquis\par CHB: s/p Pacemaker. \par Anemia: iron deficiency, recent labs 6/20 by nephrologist Iron slightly low. Takes OTC. WIll check dose\par CKD last labs creat 1.8, has improved.\par Hx of partial nephrectomy for benign tumor.  Nephrologist follow 6/10\par Does not want dialysis if it gets worse\par PVD asymptomatic. Seen by cardiologist Dr Marquez 2/2020, has follow up next week.

## 2020-10-16 ENCOUNTER — APPOINTMENT (OUTPATIENT)
Dept: NEPHROLOGY | Facility: CLINIC | Age: 85
End: 2020-10-16
Payer: MEDICARE

## 2020-10-16 VITALS
DIASTOLIC BLOOD PRESSURE: 72 MMHG | HEART RATE: 78 BPM | HEIGHT: 64 IN | BODY MASS INDEX: 29.53 KG/M2 | SYSTOLIC BLOOD PRESSURE: 136 MMHG | WEIGHT: 173 LBS

## 2020-10-16 DIAGNOSIS — Z80.3 FAMILY HISTORY OF MALIGNANT NEOPLASM OF BREAST: ICD-10-CM

## 2020-10-16 DIAGNOSIS — Z87.891 PERSONAL HISTORY OF NICOTINE DEPENDENCE: ICD-10-CM

## 2020-10-16 DIAGNOSIS — Z85.038 PERSONAL HISTORY OF OTHER MALIGNANT NEOPLASM OF LARGE INTESTINE: ICD-10-CM

## 2020-10-16 DIAGNOSIS — Z87.2 PERSONAL HISTORY OF DISEASES OF THE SKIN AND SUBCUTANEOUS TISSUE: ICD-10-CM

## 2020-10-16 DIAGNOSIS — Z86.018 PERSONAL HISTORY OF OTHER BENIGN NEOPLASM: ICD-10-CM

## 2020-10-16 PROCEDURE — 99215 OFFICE O/P EST HI 40 MIN: CPT

## 2020-10-16 NOTE — PHYSICAL EXAM
[General Appearance - Alert] : alert [General Appearance - In No Acute Distress] : in no acute distress [Strabismus] : no strabismus was seen [Sclera] : the sclera and conjunctiva were normal [Outer Ear] : the ears and nose were normal in appearance [Neck Appearance] : the appearance of the neck was normal [Neck Cervical Mass (___cm)] : no neck mass was observed [Respiration, Rhythm And Depth] : normal respiratory rhythm and effort [] : no respiratory distress [Heart Rate And Rhythm] : heart rate was normal and rhythm regular [Auscultation Breath Sounds / Voice Sounds] : lungs were clear to auscultation bilaterally [Heart Sounds] : normal S1 and S2 [Edema] : there was no peripheral edema [Bowel Sounds] : normal bowel sounds [Abdomen Soft] : soft [No CVA Tenderness] : no ~M costovertebral angle tenderness [Abdomen Tenderness] : non-tender [No Focal Deficits] : no focal deficits [Skin Color & Pigmentation] : normal skin color and pigmentation [Oriented To Time, Place, And Person] : oriented to person, place, and time [Mood] : the mood was normal [Affect] : the affect was normal [FreeTextEntry1] : pleasant man

## 2020-10-16 NOTE — REVIEW OF SYSTEMS
[Negative] : Genitourinary [Eye Pain] : no eye pain [Red Eyes] : eyes not red [Earache] : no earache [Chest Pain] : no chest pain [Nosebleeds] : no nosebleeds [Itching] : no itching [Skin Lesions] : no skin lesions [Dizziness] : no dizziness [Anxiety] : no anxiety [Fainting] : no fainting [Depression] : no depression [Muscle Weakness] : no muscle weakness

## 2020-10-16 NOTE — HISTORY OF PRESENT ILLNESS
[FreeTextEntry1] : Patient is an 89 year old male with past medical history of BPH, HTN, Gout, Pre-Diabetic, benign R sided renal mass s/p partial nephrectomy in 2006, here for follow up of CKD.\par Last seen in June.\par \par Today, \par Pt seen and examined; states he feels good.\par  No new complaint.\par \par Lives with his son and daughter in law.\par \par \par \par \par \par \par

## 2020-10-16 NOTE — ASSESSMENT
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

## 2020-10-19 ENCOUNTER — TRANSCRIPTION ENCOUNTER (OUTPATIENT)
Age: 85
End: 2020-10-19

## 2020-10-19 LAB
ALBUMIN SERPL ELPH-MCNC: 4.4 G/DL
ALP BLD-CCNC: 134 U/L
ALT SERPL-CCNC: 24 U/L
ANION GAP SERPL CALC-SCNC: 12 MMOL/L
AST SERPL-CCNC: 24 U/L
BASOPHILS # BLD AUTO: 0.04 K/UL
BASOPHILS NFR BLD AUTO: 0.6 %
BILIRUB SERPL-MCNC: 0.6 MG/DL
BUN SERPL-MCNC: 32 MG/DL
CALCIUM SERPL-MCNC: 9.2 MG/DL
CHLORIDE SERPL-SCNC: 101 MMOL/L
CHOLEST SERPL-MCNC: 134 MG/DL
CHOLEST/HDLC SERPL: 2 RATIO
CO2 SERPL-SCNC: 28 MMOL/L
CREAT SERPL-MCNC: 1.64 MG/DL
EOSINOPHIL # BLD AUTO: 0.1 K/UL
EOSINOPHIL NFR BLD AUTO: 1.6 %
ESTIMATED AVERAGE GLUCOSE: 131 MG/DL
FERRITIN SERPL-MCNC: 344 NG/ML
GLUCOSE SERPL-MCNC: 106 MG/DL
HBA1C MFR BLD HPLC: 6.2 %
HCT VFR BLD CALC: 39.7 %
HDLC SERPL-MCNC: 68 MG/DL
HGB BLD-MCNC: 12.8 G/DL
IMM GRANULOCYTES NFR BLD AUTO: 0.5 %
IRON SATN MFR SERPL: 17 %
IRON SERPL-MCNC: 44 UG/DL
LDLC SERPL CALC-MCNC: 58 MG/DL
LYMPHOCYTES # BLD AUTO: 1.24 K/UL
LYMPHOCYTES NFR BLD AUTO: 19.6 %
MAN DIFF?: NORMAL
MCHC RBC-ENTMCNC: 32.1 PG
MCHC RBC-ENTMCNC: 32.2 GM/DL
MCV RBC AUTO: 99.5 FL
MONOCYTES # BLD AUTO: 0.48 K/UL
MONOCYTES NFR BLD AUTO: 7.6 %
NEUTROPHILS # BLD AUTO: 4.45 K/UL
NEUTROPHILS NFR BLD AUTO: 70.1 %
PLATELET # BLD AUTO: 188 K/UL
POTASSIUM SERPL-SCNC: 4.5 MMOL/L
PROT SERPL-MCNC: 6.3 G/DL
RBC # BLD: 3.99 M/UL
RBC # FLD: 14.3 %
SODIUM SERPL-SCNC: 141 MMOL/L
TIBC SERPL-MCNC: 256 UG/DL
TRIGL SERPL-MCNC: 45 MG/DL
UIBC SERPL-MCNC: 212 UG/DL
WBC # FLD AUTO: 6.34 K/UL

## 2020-11-12 ENCOUNTER — NON-APPOINTMENT (OUTPATIENT)
Age: 85
End: 2020-11-12

## 2020-11-16 ENCOUNTER — APPOINTMENT (OUTPATIENT)
Dept: DERMATOLOGY | Facility: CLINIC | Age: 85
End: 2020-11-16
Payer: MEDICARE

## 2020-11-16 VITALS — HEIGHT: 64 IN | BODY MASS INDEX: 29.53 KG/M2 | WEIGHT: 173 LBS

## 2020-11-16 DIAGNOSIS — L90.5 SCAR CONDITIONS AND FIBROSIS OF SKIN: ICD-10-CM

## 2020-11-16 PROCEDURE — 99213 OFFICE O/P EST LOW 20 MIN: CPT

## 2020-11-16 PROCEDURE — 99072 ADDL SUPL MATRL&STAF TM PHE: CPT

## 2020-12-15 ENCOUNTER — NON-APPOINTMENT (OUTPATIENT)
Age: 85
End: 2020-12-15

## 2020-12-15 ENCOUNTER — APPOINTMENT (OUTPATIENT)
Dept: CARDIOLOGY | Facility: CLINIC | Age: 85
End: 2020-12-15
Payer: MEDICARE

## 2020-12-15 VITALS
HEART RATE: 99 BPM | HEIGHT: 64 IN | SYSTOLIC BLOOD PRESSURE: 138 MMHG | DIASTOLIC BLOOD PRESSURE: 52 MMHG | OXYGEN SATURATION: 98 % | BODY MASS INDEX: 30.05 KG/M2 | WEIGHT: 176 LBS

## 2020-12-15 DIAGNOSIS — I45.9 CONDUCTION DISORDER, UNSPECIFIED: ICD-10-CM

## 2020-12-15 PROCEDURE — 99072 ADDL SUPL MATRL&STAF TM PHE: CPT

## 2020-12-15 PROCEDURE — 99214 OFFICE O/P EST MOD 30 MIN: CPT | Mod: 25

## 2020-12-15 PROCEDURE — 93000 ELECTROCARDIOGRAM COMPLETE: CPT

## 2020-12-15 NOTE — DISCUSSION/SUMMARY
[FreeTextEntry1] : Pt is a 89 y/o M who presents today for f/u. 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  He states that he has been feeling well and has no complaints today.  He is compliant with medications. He has been staying safe.  He denies any bleeding problems\par TTE 02/2019 EF 77% mild-mod MR/TR, mild AI, mild-mod LVH\par Nuclear stress test 02/2019 normal myocardial perfusion\par doing well, c/w current meds\par Pt will return in 6 mnths or sooner as needed but I encouraged communication via phone or portal if necessary. \par The described plan was discussed with the pt and son.  All questions and concerns were addressed to the best of my knowledge. \par \par \par

## 2020-12-15 NOTE — HISTORY OF PRESENT ILLNESS
[FreeTextEntry1] : Pt is a 89 y/o M who presents today for f/u. 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  He states that he has been feeling well and has no complaints today.  He is compliant with medications. He has been staying safe.  He denies any bleeding problems\par TTE 02/2019 EF 77% mild-mod MR/TR, mild AI, mild-mod LVH\par Nuclear stress test 02/2019 normal myocardial perfusion\par \par Tchol 153\par HDL 66\par LDL 79\par PMH: PVD s/p peripheral stent RLE with Dr Singh, afib on Eliquis, ambulates with cane, HLD, HTN, colon cancer\par Smoking status: quit in 1963\par Current exercise: none\par Daily water intake: 50 oz\par Daily caffeine intake: 1-2 cups coffee\par OTC medications: ASA\par Family hx: pt denies any immediate family history cardiac disease\par Previous cardiac testing: unsure\par Previous hospitalizations: gout/cellulitis\par surgeries: colon cancer, partial nephrectomy 2006 benign mass\par

## 2020-12-18 ENCOUNTER — RX RENEWAL (OUTPATIENT)
Age: 85
End: 2020-12-18

## 2020-12-27 ENCOUNTER — APPOINTMENT (OUTPATIENT)
Dept: ELECTROPHYSIOLOGY | Facility: CLINIC | Age: 85
End: 2020-12-27
Payer: MEDICARE

## 2020-12-28 PROCEDURE — 93294 REM INTERROG EVL PM/LDLS PM: CPT

## 2020-12-28 PROCEDURE — 93296 REM INTERROG EVL PM/IDS: CPT

## 2021-01-13 ENCOUNTER — APPOINTMENT (OUTPATIENT)
Dept: FAMILY MEDICINE | Facility: CLINIC | Age: 86
End: 2021-01-13
Payer: MEDICARE

## 2021-01-13 ENCOUNTER — NON-APPOINTMENT (OUTPATIENT)
Age: 86
End: 2021-01-13

## 2021-01-13 VITALS
TEMPERATURE: 98.2 F | WEIGHT: 174 LBS | OXYGEN SATURATION: 98 % | HEART RATE: 78 BPM | RESPIRATION RATE: 16 BRPM | DIASTOLIC BLOOD PRESSURE: 58 MMHG | SYSTOLIC BLOOD PRESSURE: 122 MMHG | HEIGHT: 64 IN | BODY MASS INDEX: 29.71 KG/M2

## 2021-01-13 DIAGNOSIS — C44.90 UNSPECIFIED MALIGNANT NEOPLASM OF SKIN, UNSPECIFIED: ICD-10-CM

## 2021-01-13 PROCEDURE — G0439: CPT

## 2021-01-13 PROCEDURE — G0442 ANNUAL ALCOHOL SCREEN 15 MIN: CPT

## 2021-01-13 PROCEDURE — 99072 ADDL SUPL MATRL&STAF TM PHE: CPT

## 2021-01-13 PROCEDURE — 93000 ELECTROCARDIOGRAM COMPLETE: CPT | Mod: 59

## 2021-01-13 PROCEDURE — 36415 COLL VENOUS BLD VENIPUNCTURE: CPT

## 2021-01-13 NOTE — HEALTH RISK ASSESSMENT
[Good] : ~his/her~  mood as  good [No falls in past year] : Patient reported no falls in the past year [0] : 2) Feeling down, depressed, or hopeless: Not at all (0) [None] : None [With Family] : lives with family [Retired] : retired [] :  [Feels Safe at Home] : Feels safe at home [Fully functional (bathing, dressing, toileting, transferring, walking, feeding)] : Fully functional (bathing, dressing, toileting, transferring, walking, feeding) [Fully functional (using the telephone, shopping, preparing meals, housekeeping, doing laundry, using] : Fully functional and needs no help or supervision to perform IADLs (using the telephone, shopping, preparing meals, housekeeping, doing laundry, using transportation, managing medications and managing finances) [Smoke Detector] : smoke detector [Carbon Monoxide Detector] : carbon monoxide detector [Seat Belt] :  uses seat belt [Name: ___] : Health Care Proxy's Name: [unfilled]  [Relationship: ___] : Relationship: [unfilled] [] : No [YearQuit] : 1963 [de-identified] : drinks daily, one with dinner  [[id number]] : 0 [Change in mental status noted] : No change in mental status noted [Language] : denies difficulty with language [Behavior] : denies difficulty with behavior [Sexually Active] : not sexually active [Reasoning] : denies difficulty with reasoning [Reports changes in hearing] : Reports no changes in hearing [Reports changes in vision] : Reports no changes in vision [Reports changes in dental health] : Reports no changes in dental health [ColonoscopyComments] : not a screening candidate [HIVDate] : 4/2019 [HepatitisCDate] : 4/2019 [Reviewed no changes] : Reviewed no changes [AdvancecareDate] : 1/2021

## 2021-01-13 NOTE — PHYSICAL EXAM
[No Acute Distress] : no acute distress [Well Nourished] : well nourished [Well Developed] : well developed [Well-Appearing] : well-appearing [Normal Sclera/Conjunctiva] : normal sclera/conjunctiva [PERRL] : pupils equal round and reactive to light [EOMI] : extraocular movements intact [Normal Outer Ear/Nose] : the outer ears and nose were normal in appearance [Normal Oropharynx] : the oropharynx was normal [Normal TMs] : both tympanic membranes were normal [Supple] : supple [No Lymphadenopathy] : no lymphadenopathy [No Respiratory Distress] : no respiratory distress  [Thyroid Normal, No Nodules] : the thyroid was normal and there were no nodules present [Clear to Auscultation] : lungs were clear to auscultation bilaterally [No Accessory Muscle Use] : no accessory muscle use [Normal S1, S2] : normal S1 and S2 [Pedal Pulses Present] : the pedal pulses are present [No Edema] : there was no peripheral edema [Soft] : abdomen soft [Non Tender] : non-tender [Non-distended] : non-distended [No Masses] : no abdominal mass palpated [No HSM] : no HSM [Normal Bowel Sounds] : normal bowel sounds [Normal Posterior Cervical Nodes] : no posterior cervical lymphadenopathy [Normal Anterior Cervical Nodes] : no anterior cervical lymphadenopathy [No CVA Tenderness] : no CVA  tenderness [No Spinal Tenderness] : no spinal tenderness [No Joint Swelling] : no joint swelling [Grossly Normal Strength/Tone] : grossly normal strength/tone [Normal Gait] : normal gait [Coordination Grossly Intact] : coordination grossly intact [No Focal Deficits] : no focal deficits [Normal Affect] : the affect was normal [Deep Tendon Reflexes (DTR)] : deep tendon reflexes were 2+ and symmetric [Normal Insight/Judgement] : insight and judgment were intact [de-identified] : bradycardia [de-identified] : rash on abdomen - erythematous, dry flaky skin

## 2021-01-13 NOTE — ASSESSMENT
[FreeTextEntry1] : Health maintenance\par comprehensive labs\par EKG No change\par Dr Marquez  cardiology recent visit.\par \par  \par depression screening - negative\par \par Skin Ca Dr Chau\par \par Hypertension\par well controlled\par continue current meds. On Amlodipine\par \par Atrial fibrillation\par rate controlled\par on Eliquis\par \par CKD\par Following regularly with nephrology Dr Peralta\par \par Iron Def on Iron Po 2 tabs a day.\par

## 2021-01-13 NOTE — HISTORY OF PRESENT ILLNESS
[FreeTextEntry1] : annual [de-identified] : Mr. TAVIA RAYMUNDO is a 89 year old male presenting for\par No complaints.\par Seen by cardiologist Dr Marquez recently.\par Dermatology follow up for Skin Ca\par

## 2021-01-18 ENCOUNTER — TRANSCRIPTION ENCOUNTER (OUTPATIENT)
Age: 86
End: 2021-01-18

## 2021-01-18 LAB
ALBUMIN SERPL ELPH-MCNC: 4.4 G/DL
ALP BLD-CCNC: 141 U/L
ALT SERPL-CCNC: 26 U/L
ANION GAP SERPL CALC-SCNC: 11 MMOL/L
AST SERPL-CCNC: 24 U/L
BASOPHILS # BLD AUTO: 0.06 K/UL
BASOPHILS NFR BLD AUTO: 0.9 %
BILIRUB SERPL-MCNC: 0.6 MG/DL
BUN SERPL-MCNC: 36 MG/DL
CALCIUM SERPL-MCNC: 9.5 MG/DL
CHLORIDE SERPL-SCNC: 102 MMOL/L
CO2 SERPL-SCNC: 25 MMOL/L
CREAT SERPL-MCNC: 1.8 MG/DL
EOSINOPHIL # BLD AUTO: 0.19 K/UL
EOSINOPHIL NFR BLD AUTO: 2.9 %
ESTIMATED AVERAGE GLUCOSE: 117 MG/DL
FERRITIN SERPL-MCNC: 321 NG/ML
GLUCOSE SERPL-MCNC: 121 MG/DL
HBA1C MFR BLD HPLC: 5.7 %
HCT VFR BLD CALC: 38.1 %
HGB BLD-MCNC: 12.5 G/DL
IMM GRANULOCYTES NFR BLD AUTO: 0.5 %
IRON SATN MFR SERPL: 25 %
IRON SERPL-MCNC: 61 UG/DL
LYMPHOCYTES # BLD AUTO: 1.09 K/UL
LYMPHOCYTES NFR BLD AUTO: 16.7 %
MAN DIFF?: NORMAL
MCHC RBC-ENTMCNC: 32.2 PG
MCHC RBC-ENTMCNC: 32.8 GM/DL
MCV RBC AUTO: 98.2 FL
MONOCYTES # BLD AUTO: 0.54 K/UL
MONOCYTES NFR BLD AUTO: 8.3 %
NEUTROPHILS # BLD AUTO: 4.62 K/UL
NEUTROPHILS NFR BLD AUTO: 70.7 %
PLATELET # BLD AUTO: 206 K/UL
POTASSIUM SERPL-SCNC: 4.2 MMOL/L
PROT SERPL-MCNC: 6.4 G/DL
RBC # BLD: 3.88 M/UL
RBC # FLD: 14.8 %
SODIUM SERPL-SCNC: 139 MMOL/L
TIBC SERPL-MCNC: 243 UG/DL
UIBC SERPL-MCNC: 182 UG/DL
VIT B12 SERPL-MCNC: >2000 PG/ML
WBC # FLD AUTO: 6.53 K/UL

## 2021-02-12 ENCOUNTER — APPOINTMENT (OUTPATIENT)
Dept: NEPHROLOGY | Facility: CLINIC | Age: 86
End: 2021-02-12
Payer: MEDICARE

## 2021-02-12 VITALS
BODY MASS INDEX: 30.22 KG/M2 | OXYGEN SATURATION: 98 % | HEART RATE: 72 BPM | WEIGHT: 177 LBS | DIASTOLIC BLOOD PRESSURE: 76 MMHG | SYSTOLIC BLOOD PRESSURE: 138 MMHG | HEIGHT: 64 IN | TEMPERATURE: 98.4 F

## 2021-02-12 PROCEDURE — 99214 OFFICE O/P EST MOD 30 MIN: CPT

## 2021-02-12 PROCEDURE — 99072 ADDL SUPL MATRL&STAF TM PHE: CPT

## 2021-02-12 NOTE — REVIEW OF SYSTEMS
[Negative] : Genitourinary [Eye Pain] : no eye pain [Red Eyes] : eyes not red [Earache] : no earache [Nosebleeds] : no nosebleeds [Chest Pain] : no chest pain [Skin Lesions] : no skin lesions [Itching] : no itching [Dizziness] : no dizziness [Fainting] : no fainting [Anxiety] : no anxiety [Depression] : no depression [Muscle Weakness] : no muscle weakness

## 2021-02-12 NOTE — HISTORY OF PRESENT ILLNESS
[FreeTextEntry1] : Patient is an 89 year old man with past medical history of BPH, HTN, Gout, Pre-Diabetic, benign R sided renal mass s/p partial nephrectomy in 2006, here for follow up of CKD.\par \par Today, \par Pt seen and examined; states he feels good.\par  No new complaint.\par Recently had labs done with Dr. Suggs.\par He is waiting to get the CoVID 19 vaccine \par \par Lives with his son and daughter in law.\par \par \par \par \par \par \par

## 2021-02-12 NOTE — ASSESSMENT
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

## 2021-03-29 ENCOUNTER — APPOINTMENT (OUTPATIENT)
Dept: ELECTROPHYSIOLOGY | Facility: CLINIC | Age: 86
End: 2021-03-29
Payer: MEDICARE

## 2021-03-30 ENCOUNTER — NON-APPOINTMENT (OUTPATIENT)
Age: 86
End: 2021-03-30

## 2021-03-30 PROCEDURE — 93296 REM INTERROG EVL PM/IDS: CPT

## 2021-03-30 PROCEDURE — 93294 REM INTERROG EVL PM/LDLS PM: CPT

## 2021-04-07 ENCOUNTER — APPOINTMENT (OUTPATIENT)
Dept: FAMILY MEDICINE | Facility: CLINIC | Age: 86
End: 2021-04-07
Payer: MEDICARE

## 2021-04-07 VITALS
HEART RATE: 78 BPM | SYSTOLIC BLOOD PRESSURE: 120 MMHG | HEIGHT: 64 IN | BODY MASS INDEX: 30.22 KG/M2 | OXYGEN SATURATION: 98 % | WEIGHT: 177 LBS | TEMPERATURE: 98 F | DIASTOLIC BLOOD PRESSURE: 52 MMHG | RESPIRATION RATE: 16 BRPM

## 2021-04-07 PROCEDURE — 99072 ADDL SUPL MATRL&STAF TM PHE: CPT

## 2021-04-07 PROCEDURE — G0442 ANNUAL ALCOHOL SCREEN 15 MIN: CPT | Mod: 59

## 2021-04-07 PROCEDURE — 99214 OFFICE O/P EST MOD 30 MIN: CPT | Mod: 25

## 2021-04-07 NOTE — PHYSICAL EXAM
[No Acute Distress] : no acute distress [Well Nourished] : well nourished [Well Developed] : well developed [Well-Appearing] : well-appearing [Normal Sclera/Conjunctiva] : normal sclera/conjunctiva [PERRL] : pupils equal round and reactive to light [EOMI] : extraocular movements intact [Normal Outer Ear/Nose] : the outer ears and nose were normal in appearance [Normal Oropharynx] : the oropharynx was normal [Normal TMs] : both tympanic membranes were normal [Supple] : supple [No Lymphadenopathy] : no lymphadenopathy [Thyroid Normal, No Nodules] : the thyroid was normal and there were no nodules present [No Respiratory Distress] : no respiratory distress  [Clear to Auscultation] : lungs were clear to auscultation bilaterally [No Accessory Muscle Use] : no accessory muscle use [Normal S1, S2] : normal S1 and S2 [Pedal Pulses Present] : the pedal pulses are present [No Edema] : there was no peripheral edema [Soft] : abdomen soft [Non Tender] : non-tender [Non-distended] : non-distended [No Masses] : no abdominal mass palpated [No HSM] : no HSM [Normal Bowel Sounds] : normal bowel sounds [Normal Posterior Cervical Nodes] : no posterior cervical lymphadenopathy [Normal Anterior Cervical Nodes] : no anterior cervical lymphadenopathy [No CVA Tenderness] : no CVA  tenderness [No Spinal Tenderness] : no spinal tenderness [No Joint Swelling] : no joint swelling [Grossly Normal Strength/Tone] : grossly normal strength/tone [Normal Gait] : normal gait [Coordination Grossly Intact] : coordination grossly intact [No Focal Deficits] : no focal deficits [Deep Tendon Reflexes (DTR)] : deep tendon reflexes were 2+ and symmetric [Normal Affect] : the affect was normal [Normal Insight/Judgement] : insight and judgment were intact [de-identified] : raised papule on the left side of the scalp temporal area. [de-identified] : bradycardia [de-identified] : rash on abdomen - erythematous, dry flaky skin

## 2021-04-07 NOTE — HISTORY OF PRESENT ILLNESS
[FreeTextEntry1] : follow up [de-identified] : Mr. TAVIA RAYMUNDO is a 89 year old male presenting for follow up\par HTN BP is stable. On Amlodipine.\par HLD on Atorvastatin.\par A1C elevated, diet is okay.\par No complaints.\par Seen by cardiologist Dr Marquez recently.\par Dermatology follow up for Skin Ca\par Seen by Dr Peralta, nephrologist 2/21\par

## 2021-04-07 NOTE — ASSESSMENT
[FreeTextEntry1] : HLD on Atorvastatin.\par A1C elevated, diet is okay.\par No complaints.\par Seen by cardiologist Dr Marquez recently.\par \par Dr Marquez  cardiology recent visit.\par SBIRT positive\par Reduce frequency per week.\par  \par depression screening - negative\par \par Skin Ca Dr Bryant\par \par Hypertension\par well controlled\par continue current meds. On Amlodipine\par \par Atrial fibrillation\par rate controlled\par on Eliquis\par \par CKD: Dr Peralta last visit 2/21\par Following regularly with nephrology Dr Peralta\par \par Iron Def on Iron Po 2 tabs a day.\par Labs done in office today

## 2021-04-12 ENCOUNTER — TRANSCRIPTION ENCOUNTER (OUTPATIENT)
Age: 86
End: 2021-04-12

## 2021-04-12 LAB
ALBUMIN SERPL ELPH-MCNC: 4.3 G/DL
ALP BLD-CCNC: 143 U/L
ALT SERPL-CCNC: 67 U/L
ANION GAP SERPL CALC-SCNC: 11 MMOL/L
AST SERPL-CCNC: 64 U/L
BILIRUB SERPL-MCNC: 0.5 MG/DL
BUN SERPL-MCNC: 39 MG/DL
CALCIUM SERPL-MCNC: 9.6 MG/DL
CHLORIDE SERPL-SCNC: 105 MMOL/L
CHOLEST SERPL-MCNC: 122 MG/DL
CO2 SERPL-SCNC: 26 MMOL/L
CREAT SERPL-MCNC: 1.71 MG/DL
ESTIMATED AVERAGE GLUCOSE: 126 MG/DL
FERRITIN SERPL-MCNC: 323 NG/ML
GLUCOSE SERPL-MCNC: 118 MG/DL
HBA1C MFR BLD HPLC: 6 %
HDLC SERPL-MCNC: 71 MG/DL
IRON SATN MFR SERPL: 22 %
IRON SERPL-MCNC: 56 UG/DL
LDLC SERPL CALC-MCNC: 44 MG/DL
NONHDLC SERPL-MCNC: 51 MG/DL
POTASSIUM SERPL-SCNC: 4.2 MMOL/L
PROT SERPL-MCNC: 6.5 G/DL
SODIUM SERPL-SCNC: 143 MMOL/L
TIBC SERPL-MCNC: 260 UG/DL
TRIGL SERPL-MCNC: 34 MG/DL
UIBC SERPL-MCNC: 204 UG/DL

## 2021-04-30 PROBLEM — D48.9 NEOPLASM OF UNCERTAIN BEHAVIOR: Status: ACTIVE | Noted: 2021-04-30

## 2021-05-03 ENCOUNTER — APPOINTMENT (OUTPATIENT)
Dept: DERMATOLOGY | Facility: CLINIC | Age: 86
End: 2021-05-03
Payer: MEDICARE

## 2021-05-03 ENCOUNTER — RX RENEWAL (OUTPATIENT)
Age: 86
End: 2021-05-03

## 2021-05-03 DIAGNOSIS — D48.9 NEOPLASM OF UNCERTAIN BEHAVIOR, UNSPECIFIED: ICD-10-CM

## 2021-05-03 PROCEDURE — 99072 ADDL SUPL MATRL&STAF TM PHE: CPT

## 2021-05-03 PROCEDURE — 99213 OFFICE O/P EST LOW 20 MIN: CPT | Mod: 25

## 2021-05-03 PROCEDURE — 11102 TANGNTL BX SKIN SINGLE LES: CPT

## 2021-05-03 NOTE — ASSESSMENT
[FreeTextEntry1] : 1) benign findings as above- education\par no evidence recurrence BCC\par \par 2) Shave bx location mid scalp\par diagnosis: r/o SCC vs. ISK\par  \par Shave biopsy performed today over above location, risks and benefits discussed including incomplete removal, not enough tissue for diagnosis scarring and infection, informed consent obtained, pictures taken,  cleaned with alcohol and anesthetized with 1%lido+epi, 0.3 cc total, hemostasis obtained with cautery, vaseline and bandaid placed, tolerated well, wound care reviewed, specimen sent to pathology.\par \par

## 2021-05-03 NOTE — PHYSICAL EXAM
[FreeTextEntry3] : AAOx3, pleasant, NAD, no visual lymphadenopathy\par hair, scalp, face, nose, eyelids, ears, lips, oropharynx, neck, chest, abdomen, back, right arm, left arm, nails, and hands examined with all normal findings,\par pertinent findings include:\par \par multiple benign nevi and lentigines\par well healing excision/D&C site\par large verrucous plaque on scalp

## 2021-05-06 LAB — CORE LAB BIOPSY: NORMAL

## 2021-06-15 ENCOUNTER — NON-APPOINTMENT (OUTPATIENT)
Age: 86
End: 2021-06-15

## 2021-06-15 ENCOUNTER — APPOINTMENT (OUTPATIENT)
Dept: CARDIOLOGY | Facility: CLINIC | Age: 86
End: 2021-06-15
Payer: MEDICARE

## 2021-06-15 VITALS
HEIGHT: 64 IN | OXYGEN SATURATION: 98 % | SYSTOLIC BLOOD PRESSURE: 140 MMHG | WEIGHT: 171 LBS | BODY MASS INDEX: 29.19 KG/M2 | HEART RATE: 71 BPM | DIASTOLIC BLOOD PRESSURE: 64 MMHG

## 2021-06-15 PROCEDURE — 99214 OFFICE O/P EST MOD 30 MIN: CPT | Mod: 25

## 2021-06-15 PROCEDURE — 99072 ADDL SUPL MATRL&STAF TM PHE: CPT

## 2021-06-15 PROCEDURE — 93000 ELECTROCARDIOGRAM COMPLETE: CPT

## 2021-06-23 ENCOUNTER — NON-APPOINTMENT (OUTPATIENT)
Age: 86
End: 2021-06-23

## 2021-06-23 NOTE — HISTORY OF PRESENT ILLNESS
[FreeTextEntry1] : Pt is a 89 y/o M who presents today for f/u. 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  He states that he has been feeling well and has no complaints today.  He is compliant with medications. He denies any bleeding problems\par COVID vaccine 03/2021 Pfizer\par \par TTE 02/2019 EF 77% mild-mod MR/TR, mild AI, mild-mod LVH\par Nuclear stress test 02/2019 normal myocardial perfusion\par \par labs 04/2021\par Tchol 122\par HDL 71\par LDL 44\par A1c 6.0\par Cr 1.71\par \par PMH: PVD s/p peripheral stent RLE with Dr Singh, afib on Eliquis, ambulates with cane, HLD, HTN, colon cancer, PPM\par Smoking status: quit in 1963\par Current exercise: none\par Daily water intake: 50 oz\par Daily caffeine intake: 1-2 cups coffee\par OTC medications: ASA\par Family hx: pt denies any immediate family history cardiac disease\par Previous cardiac testing: unsure\par Previous hospitalizations: gout/cellulitis\par surgeries: colon cancer, partial nephrectomy 2006 benign mass\par

## 2021-06-23 NOTE — DISCUSSION/SUMMARY
[FreeTextEntry1] : Pt is a 89 y/o M who presents today for f/u. 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  He states that he has been feeling well and has no complaints today.  He is compliant with medications. He has been staying safe.  He denies any bleeding problems\par TTE 02/2019 EF 77% mild-mod MR/TR, mild AI, mild-mod LVH\par Nuclear stress test 02/2019 normal myocardial perfusion\par doing well, c/w current meds\par Pt will return in 6 mnths or sooner as needed but I encouraged communication via phone or portal if necessary. \par The described plan was discussed with the pt and son.  All questions and concerns were addressed to the best of my knowledge. \par \par \par

## 2021-06-28 ENCOUNTER — NON-APPOINTMENT (OUTPATIENT)
Age: 86
End: 2021-06-28

## 2021-06-29 ENCOUNTER — APPOINTMENT (OUTPATIENT)
Dept: ELECTROPHYSIOLOGY | Facility: CLINIC | Age: 86
End: 2021-06-29
Payer: MEDICARE

## 2021-06-29 ENCOUNTER — APPOINTMENT (OUTPATIENT)
Dept: CARDIOLOGY | Facility: CLINIC | Age: 86
End: 2021-06-29

## 2021-06-29 ENCOUNTER — NON-APPOINTMENT (OUTPATIENT)
Age: 86
End: 2021-06-29

## 2021-06-29 PROCEDURE — 93296 REM INTERROG EVL PM/IDS: CPT

## 2021-06-29 PROCEDURE — 93294 REM INTERROG EVL PM/LDLS PM: CPT

## 2021-07-09 ENCOUNTER — APPOINTMENT (OUTPATIENT)
Dept: NEPHROLOGY | Facility: CLINIC | Age: 86
End: 2021-07-09
Payer: MEDICARE

## 2021-07-09 VITALS
HEIGHT: 64 IN | DIASTOLIC BLOOD PRESSURE: 60 MMHG | OXYGEN SATURATION: 97 % | BODY MASS INDEX: 29.71 KG/M2 | SYSTOLIC BLOOD PRESSURE: 110 MMHG | WEIGHT: 174 LBS | HEART RATE: 67 BPM

## 2021-07-09 PROCEDURE — 99215 OFFICE O/P EST HI 40 MIN: CPT

## 2021-07-09 PROCEDURE — 99072 ADDL SUPL MATRL&STAF TM PHE: CPT

## 2021-07-09 NOTE — PHYSICAL EXAM
[General Appearance - Alert] : alert [General Appearance - In No Acute Distress] : in no acute distress [Sclera] : the sclera and conjunctiva were normal [Strabismus] : no strabismus was seen [Outer Ear] : the ears and nose were normal in appearance [Neck Appearance] : the appearance of the neck was normal [Neck Cervical Mass (___cm)] : no neck mass was observed [] : no respiratory distress [Respiration, Rhythm And Depth] : normal respiratory rhythm and effort [Auscultation Breath Sounds / Voice Sounds] : lungs were clear to auscultation bilaterally [Heart Rate And Rhythm] : heart rate was normal and rhythm regular [Heart Sounds] : normal S1 and S2 [Edema] : there was no peripheral edema [Bowel Sounds] : normal bowel sounds [Abdomen Soft] : soft [Abdomen Tenderness] : non-tender [No CVA Tenderness] : no ~M costovertebral angle tenderness [Skin Color & Pigmentation] : normal skin color and pigmentation [No Focal Deficits] : no focal deficits [Oriented To Time, Place, And Person] : oriented to person, place, and time [Affect] : the affect was normal [Mood] : the mood was normal [FreeTextEntry1] : ppm in left chest wall

## 2021-07-09 NOTE — HISTORY OF PRESENT ILLNESS
[FreeTextEntry1] : Patient is an 89 year old man with past medical history of BPH, HTN, Gout, Pre-Diabetic, benign R sided renal mass s/p partial nephrectomy in 2006, here for follow up of CKD.\par \par Today, \par Pt seen and examined; states he feels good.\par  No new complaint.\par BB was adjusted by Dr. Marquez\par \par \par Lives with his son and daughter in law.\par \par \par \par \par \par \par

## 2021-07-10 LAB
25(OH)D3 SERPL-MCNC: 52.9 NG/ML
ALBUMIN SERPL ELPH-MCNC: 4.2 G/DL
ANION GAP SERPL CALC-SCNC: 13 MMOL/L
APPEARANCE: CLEAR
BACTERIA: NEGATIVE
BASOPHILS # BLD AUTO: 0.06 K/UL
BASOPHILS NFR BLD AUTO: 1 %
BILIRUBIN URINE: NEGATIVE
BLOOD URINE: NEGATIVE
BUN SERPL-MCNC: 40 MG/DL
CALCIUM SERPL-MCNC: 9.3 MG/DL
CALCIUM SERPL-MCNC: 9.3 MG/DL
CHLORIDE SERPL-SCNC: 102 MMOL/L
CO2 SERPL-SCNC: 24 MMOL/L
COLOR: COLORLESS
CREAT SERPL-MCNC: 1.91 MG/DL
CREAT SPEC-SCNC: 31 MG/DL
CREAT/PROT UR: 0.3 RATIO
EOSINOPHIL # BLD AUTO: 0.2 K/UL
EOSINOPHIL NFR BLD AUTO: 3.3 %
GLUCOSE QUALITATIVE U: NEGATIVE
GLUCOSE SERPL-MCNC: 126 MG/DL
HCT VFR BLD CALC: 36 %
HGB BLD-MCNC: 11.6 G/DL
HYALINE CASTS: 0 /LPF
IMM GRANULOCYTES NFR BLD AUTO: 0.3 %
KETONES URINE: NEGATIVE
LEUKOCYTE ESTERASE URINE: ABNORMAL
LYMPHOCYTES # BLD AUTO: 0.97 K/UL
LYMPHOCYTES NFR BLD AUTO: 16.1 %
MAN DIFF?: NORMAL
MCHC RBC-ENTMCNC: 31.8 PG
MCHC RBC-ENTMCNC: 32.2 GM/DL
MCV RBC AUTO: 98.6 FL
MICROSCOPIC-UA: NORMAL
MONOCYTES # BLD AUTO: 0.39 K/UL
MONOCYTES NFR BLD AUTO: 6.5 %
NEUTROPHILS # BLD AUTO: 4.38 K/UL
NEUTROPHILS NFR BLD AUTO: 72.8 %
NITRITE URINE: NEGATIVE
PARATHYROID HORMONE INTACT: 83 PG/ML
PH URINE: 5.5
PHOSPHATE SERPL-MCNC: 3.8 MG/DL
PLATELET # BLD AUTO: 193 K/UL
POTASSIUM SERPL-SCNC: 3.9 MMOL/L
PROT UR-MCNC: 8 MG/DL
PROTEIN URINE: NEGATIVE
RBC # BLD: 3.65 M/UL
RBC # FLD: 15.7 %
RED BLOOD CELLS URINE: 0 /HPF
SODIUM SERPL-SCNC: 139 MMOL/L
SPECIFIC GRAVITY URINE: 1.01
SQUAMOUS EPITHELIAL CELLS: 0 /HPF
UROBILINOGEN URINE: NORMAL
WBC # FLD AUTO: 6.02 K/UL
WHITE BLOOD CELLS URINE: 19 /HPF

## 2021-07-14 ENCOUNTER — APPOINTMENT (OUTPATIENT)
Dept: FAMILY MEDICINE | Facility: CLINIC | Age: 86
End: 2021-07-14
Payer: MEDICARE

## 2021-07-14 VITALS
OXYGEN SATURATION: 98 % | HEIGHT: 64 IN | SYSTOLIC BLOOD PRESSURE: 112 MMHG | HEART RATE: 66 BPM | RESPIRATION RATE: 14 BRPM | DIASTOLIC BLOOD PRESSURE: 42 MMHG | TEMPERATURE: 98 F | WEIGHT: 179 LBS | BODY MASS INDEX: 30.56 KG/M2

## 2021-07-14 PROCEDURE — 99072 ADDL SUPL MATRL&STAF TM PHE: CPT

## 2021-07-14 PROCEDURE — 99214 OFFICE O/P EST MOD 30 MIN: CPT

## 2021-07-14 NOTE — ASSESSMENT
[FreeTextEntry1] : HLD on Atorvastatin.\par \par A1C elevated, diet is okay.\par No complaints.\par Seen by cardiologist Dr Marquez recently.\par \par Skin Ca Dr Bryant 5/21\par \par Hypertension\par well controlled\par continue current meds. On Amlodipine\par \par Atrial fibrillation\par rate controlled\par on Eliquis\par \par CKD: Dr Peralta last visit 7/21\par Recent labs in chart\par Stable\par \par Iron Def on Iron Po 2 tabs a day.\par Follow up 11/21

## 2021-07-14 NOTE — HISTORY OF PRESENT ILLNESS
[FreeTextEntry1] : follow  [de-identified] : Mr. TAVIA RAYMUNDO is a 89 year old male presenting for follow up\par HTN BP is stable. On Amlodipine.\par HLD on Atorvastatin.\par A1C elevated, diet is okay.\par No complaints.\par Seen by cardiologist Dr Marquez recently.\par Dermatology follow up for Skin Ca\par CKD, Seen by Dr Peralta, nephrologist 2/21. Renal panel done stable\par A Fib on Eliquis.\par

## 2021-07-14 NOTE — PHYSICAL EXAM
[No Acute Distress] : no acute distress [Well Nourished] : well nourished [Well Developed] : well developed [Well-Appearing] : well-appearing [Normal Sclera/Conjunctiva] : normal sclera/conjunctiva [PERRL] : pupils equal round and reactive to light [EOMI] : extraocular movements intact [Normal Outer Ear/Nose] : the outer ears and nose were normal in appearance [Normal Oropharynx] : the oropharynx was normal [Normal TMs] : both tympanic membranes were normal [Supple] : supple [No Lymphadenopathy] : no lymphadenopathy [Thyroid Normal, No Nodules] : the thyroid was normal and there were no nodules present [No Respiratory Distress] : no respiratory distress  [Clear to Auscultation] : lungs were clear to auscultation bilaterally [No Accessory Muscle Use] : no accessory muscle use [Normal S1, S2] : normal S1 and S2 [Pedal Pulses Present] : the pedal pulses are present [No Edema] : there was no peripheral edema [Soft] : abdomen soft [Non Tender] : non-tender [Non-distended] : non-distended [No Masses] : no abdominal mass palpated [No HSM] : no HSM [Normal Bowel Sounds] : normal bowel sounds [Normal Posterior Cervical Nodes] : no posterior cervical lymphadenopathy [Normal Anterior Cervical Nodes] : no anterior cervical lymphadenopathy [No CVA Tenderness] : no CVA  tenderness [No Spinal Tenderness] : no spinal tenderness [No Joint Swelling] : no joint swelling [Grossly Normal Strength/Tone] : grossly normal strength/tone [Normal Gait] : normal gait [Coordination Grossly Intact] : coordination grossly intact [No Focal Deficits] : no focal deficits [Deep Tendon Reflexes (DTR)] : deep tendon reflexes were 2+ and symmetric [Normal Affect] : the affect was normal [Normal Insight/Judgement] : insight and judgment were intact [de-identified] : raised papule on the left side of the scalp temporal area. [de-identified] : bradycardia [de-identified] : rash on abdomen - erythematous, dry flaky skin

## 2021-09-22 ENCOUNTER — APPOINTMENT (OUTPATIENT)
Dept: FAMILY MEDICINE | Facility: CLINIC | Age: 86
End: 2021-09-22
Payer: MEDICARE

## 2021-09-22 PROCEDURE — G0008: CPT

## 2021-09-22 PROCEDURE — 90662 IIV NO PRSV INCREASED AG IM: CPT

## 2021-09-27 ENCOUNTER — APPOINTMENT (OUTPATIENT)
Dept: ELECTROPHYSIOLOGY | Facility: CLINIC | Age: 86
End: 2021-09-27
Payer: MEDICARE

## 2021-09-28 ENCOUNTER — NON-APPOINTMENT (OUTPATIENT)
Age: 86
End: 2021-09-28

## 2021-09-28 PROCEDURE — 93294 REM INTERROG EVL PM/LDLS PM: CPT

## 2021-09-28 PROCEDURE — 93296 REM INTERROG EVL PM/IDS: CPT

## 2021-10-05 ENCOUNTER — APPOINTMENT (OUTPATIENT)
Dept: FAMILY MEDICINE | Facility: CLINIC | Age: 86
End: 2021-10-05
Payer: MEDICARE

## 2021-10-05 VITALS
TEMPERATURE: 97.9 F | HEIGHT: 64 IN | OXYGEN SATURATION: 98 % | RESPIRATION RATE: 14 BRPM | DIASTOLIC BLOOD PRESSURE: 58 MMHG | SYSTOLIC BLOOD PRESSURE: 122 MMHG | BODY MASS INDEX: 29.53 KG/M2 | HEART RATE: 65 BPM | WEIGHT: 173 LBS

## 2021-10-05 DIAGNOSIS — N40.0 BENIGN PROSTATIC HYPERPLASIA WITHOUT LOWER URINARY TRACT SYMPMS: ICD-10-CM

## 2021-10-05 DIAGNOSIS — R35.1 NOCTURIA: ICD-10-CM

## 2021-10-05 PROCEDURE — 99214 OFFICE O/P EST MOD 30 MIN: CPT

## 2021-10-05 NOTE — HISTORY OF PRESENT ILLNESS
[FreeTextEntry1] : follow up [de-identified] : Mr. TAVIA RAYMUNDO is a 89 year old male presenting for follow up\par HTN BP is stable. On Amlodipine.\par HLD on Atorvastatin.\par A1C elevated, diet is okay.\par No complaints.\par Seen by cardiologist Dr Marquez 6/21\par Dermatology follow up for Skin Ca\par CKD, Seen by Dr Peralta, nephrologist 7/21. Renal panel done stable\par A Fib on Eliquis.\par Nocturia goes 3 times at night. On Tamsulosin\par

## 2021-10-05 NOTE — ASSESSMENT
[FreeTextEntry1] : HLD on Atorvastatin.\par \par A1C elevated, diet is okay.\par No complaints.\par Seen by cardiologist Dr Marquez recently.\par \par Skin Ca Dr Bryant 5/21\par \par Hypertension\par well controlled\par continue current meds. On Amlodipine\par \par Atrial fibrillation\par rate controlled\par on Eliquis\par \par CKD: Dr Peralta last visit 7/21\par Recent labs in chart\par Stable\par \par BPH: Nocturia goes 3 times at night. On Tamsulosin.\par See urologist\par will try [facility] [place]\par \par Iron Def on Iron Po 2 tabs a day.\par Follow up 3-4 months

## 2021-10-05 NOTE — PHYSICAL EXAM
[No Acute Distress] : no acute distress [Well Nourished] : well nourished [Well Developed] : well developed [Well-Appearing] : well-appearing [Normal Sclera/Conjunctiva] : normal sclera/conjunctiva [PERRL] : pupils equal round and reactive to light [EOMI] : extraocular movements intact [Normal Outer Ear/Nose] : the outer ears and nose were normal in appearance [Normal Oropharynx] : the oropharynx was normal [Normal TMs] : both tympanic membranes were normal [Supple] : supple [No Lymphadenopathy] : no lymphadenopathy [Thyroid Normal, No Nodules] : the thyroid was normal and there were no nodules present [No Respiratory Distress] : no respiratory distress  [Clear to Auscultation] : lungs were clear to auscultation bilaterally [No Accessory Muscle Use] : no accessory muscle use [Normal S1, S2] : normal S1 and S2 [Pedal Pulses Present] : the pedal pulses are present [No Edema] : there was no peripheral edema [Soft] : abdomen soft [Non Tender] : non-tender [Non-distended] : non-distended [No Masses] : no abdominal mass palpated [No HSM] : no HSM [Normal Bowel Sounds] : normal bowel sounds [Normal Posterior Cervical Nodes] : no posterior cervical lymphadenopathy [Normal Anterior Cervical Nodes] : no anterior cervical lymphadenopathy [No CVA Tenderness] : no CVA  tenderness [No Spinal Tenderness] : no spinal tenderness [No Joint Swelling] : no joint swelling [Grossly Normal Strength/Tone] : grossly normal strength/tone [Normal Gait] : normal gait [Coordination Grossly Intact] : coordination grossly intact [No Focal Deficits] : no focal deficits [Deep Tendon Reflexes (DTR)] : deep tendon reflexes were 2+ and symmetric [Normal Affect] : the affect was normal [Normal Insight/Judgement] : insight and judgment were intact [de-identified] : raised papule on the left side of the scalp temporal area. [de-identified] : bradycardia [de-identified] : rash on abdomen - erythematous, dry flaky skin

## 2021-10-18 ENCOUNTER — APPOINTMENT (OUTPATIENT)
Dept: DERMATOLOGY | Facility: CLINIC | Age: 86
End: 2021-10-18
Payer: MEDICARE

## 2021-10-18 DIAGNOSIS — D22.9 MELANOCYTIC NEVI, UNSPECIFIED: ICD-10-CM

## 2021-10-18 DIAGNOSIS — Z12.83 ENCOUNTER FOR SCREENING FOR MALIGNANT NEOPLASM OF SKIN: ICD-10-CM

## 2021-10-18 PROCEDURE — 99213 OFFICE O/P EST LOW 20 MIN: CPT

## 2021-11-08 ENCOUNTER — APPOINTMENT (OUTPATIENT)
Dept: NEPHROLOGY | Facility: CLINIC | Age: 86
End: 2021-11-08
Payer: MEDICARE

## 2021-11-08 VITALS
DIASTOLIC BLOOD PRESSURE: 70 MMHG | HEART RATE: 70 BPM | WEIGHT: 174 LBS | HEIGHT: 64 IN | SYSTOLIC BLOOD PRESSURE: 120 MMHG | OXYGEN SATURATION: 98 % | BODY MASS INDEX: 29.71 KG/M2 | TEMPERATURE: 98 F

## 2021-11-08 PROCEDURE — 99214 OFFICE O/P EST MOD 30 MIN: CPT

## 2021-11-08 NOTE — HISTORY OF PRESENT ILLNESS
[FreeTextEntry1] : Patient is an 89 year old man with past medical history of BPH, HTN, Gout, Pre-Diabetic, benign R sided renal mass s/p partial nephrectomy in 2006, here for follow up of CKD.\par \par Today, \par Pt seen and examined; states he feels good.\par No new complaint.\par Feels well.\par Got his COVID booster last month.\par \par \par Lives with his son and daughter in law.\par \par \par \par \par \par \par

## 2021-11-08 NOTE — ASSESSMENT
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

## 2021-11-09 ENCOUNTER — RX RENEWAL (OUTPATIENT)
Age: 86
End: 2021-11-09

## 2021-11-15 LAB
25(OH)D3 SERPL-MCNC: 47.6 NG/ML
ALBUMIN SERPL ELPH-MCNC: 4.3 G/DL
ANION GAP SERPL CALC-SCNC: 12 MMOL/L
APPEARANCE: CLEAR
BACTERIA: NEGATIVE
BASOPHILS # BLD AUTO: 0.05 K/UL
BASOPHILS NFR BLD AUTO: 0.8 %
BILIRUBIN URINE: NEGATIVE
BLOOD URINE: NEGATIVE
BUN SERPL-MCNC: 34 MG/DL
CALCIUM SERPL-MCNC: 9.3 MG/DL
CALCIUM SERPL-MCNC: 9.3 MG/DL
CHLORIDE SERPL-SCNC: 104 MMOL/L
CO2 SERPL-SCNC: 22 MMOL/L
COLOR: COLORLESS
CREAT SERPL-MCNC: 1.69 MG/DL
CREAT SPEC-SCNC: 21 MG/DL
CREAT/PROT UR: NORMAL RATIO
EOSINOPHIL # BLD AUTO: 0.07 K/UL
EOSINOPHIL NFR BLD AUTO: 1.1 %
GLUCOSE QUALITATIVE U: NEGATIVE
GLUCOSE SERPL-MCNC: 129 MG/DL
HCT VFR BLD CALC: 38.5 %
HGB BLD-MCNC: 12.5 G/DL
HYALINE CASTS: 1 /LPF
IMM GRANULOCYTES NFR BLD AUTO: 0.6 %
KETONES URINE: NEGATIVE
LEUKOCYTE ESTERASE URINE: ABNORMAL
LYMPHOCYTES # BLD AUTO: 1.06 K/UL
LYMPHOCYTES NFR BLD AUTO: 16.6 %
MAN DIFF?: NORMAL
MCHC RBC-ENTMCNC: 31.4 PG
MCHC RBC-ENTMCNC: 32.5 GM/DL
MCV RBC AUTO: 96.7 FL
MICROSCOPIC-UA: NORMAL
MONOCYTES # BLD AUTO: 0.45 K/UL
MONOCYTES NFR BLD AUTO: 7.1 %
NEUTROPHILS # BLD AUTO: 4.7 K/UL
NEUTROPHILS NFR BLD AUTO: 73.8 %
NITRITE URINE: NEGATIVE
PARATHYROID HORMONE INTACT: 96 PG/ML
PH URINE: 5
PHOSPHATE SERPL-MCNC: 3.7 MG/DL
PLATELET # BLD AUTO: 209 K/UL
POTASSIUM SERPL-SCNC: 4.1 MMOL/L
PROT UR-MCNC: <4 MG/DL
PROTEIN URINE: NEGATIVE
RBC # BLD: 3.98 M/UL
RBC # FLD: 15.3 %
RED BLOOD CELLS URINE: 0 /HPF
SODIUM SERPL-SCNC: 139 MMOL/L
SPECIFIC GRAVITY URINE: 1.01
SQUAMOUS EPITHELIAL CELLS: 1 /HPF
UROBILINOGEN URINE: NORMAL
WBC # FLD AUTO: 6.37 K/UL
WHITE BLOOD CELLS URINE: 17 /HPF

## 2021-11-16 ENCOUNTER — APPOINTMENT (OUTPATIENT)
Dept: DERMATOLOGY | Facility: CLINIC | Age: 86
End: 2021-11-16

## 2021-12-08 ENCOUNTER — APPOINTMENT (OUTPATIENT)
Dept: ELECTROPHYSIOLOGY | Facility: CLINIC | Age: 86
End: 2021-12-08
Payer: MEDICARE

## 2021-12-08 VITALS
BODY MASS INDEX: 28.34 KG/M2 | HEART RATE: 77 BPM | OXYGEN SATURATION: 100 % | DIASTOLIC BLOOD PRESSURE: 73 MMHG | HEIGHT: 64 IN | SYSTOLIC BLOOD PRESSURE: 142 MMHG | WEIGHT: 166 LBS

## 2021-12-08 DIAGNOSIS — R00.1 BRADYCARDIA, UNSPECIFIED: ICD-10-CM

## 2021-12-08 PROCEDURE — 93280 PM DEVICE PROGR EVAL DUAL: CPT

## 2021-12-15 ENCOUNTER — APPOINTMENT (OUTPATIENT)
Dept: CARDIOLOGY | Facility: CLINIC | Age: 86
End: 2021-12-15
Payer: MEDICARE

## 2021-12-15 ENCOUNTER — NON-APPOINTMENT (OUTPATIENT)
Age: 86
End: 2021-12-15

## 2021-12-15 VITALS
HEART RATE: 70 BPM | HEIGHT: 64 IN | WEIGHT: 169 LBS | BODY MASS INDEX: 28.85 KG/M2 | OXYGEN SATURATION: 97 % | SYSTOLIC BLOOD PRESSURE: 120 MMHG | DIASTOLIC BLOOD PRESSURE: 70 MMHG

## 2021-12-15 PROCEDURE — 99214 OFFICE O/P EST MOD 30 MIN: CPT | Mod: 25

## 2021-12-15 PROCEDURE — 93000 ELECTROCARDIOGRAM COMPLETE: CPT

## 2021-12-15 NOTE — REASON FOR VISIT
[Hyperlipidemia] : hyperlipidemia [Hypertension] : hypertension [Arrhythmia/ECG Abnorrmalities] : arrhythmia/ECG abnormalities

## 2021-12-15 NOTE — HISTORY OF PRESENT ILLNESS
[FreeTextEntry1] : Pt is a 89 y/o M who presents today for f/u. 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  He states that he has been feeling well and has no complaints today.  He is compliant with medications. He denies any bleeding problems\par COVID vaccine 03/2021 Pfizer, booster 10/2021\par \par TTE 02/2019 EF 77% mild-mod MR/TR, mild AI, mild-mod LVH\par Nuclear stress test 02/2019 normal myocardial perfusion\par \par labs 04/2021\par Tchol 122\par HDL 71\par LDL 44\par A1c 6.0\par Cr 1.71\par \par PMH: PVD s/p peripheral stent RLE with Dr Singh, afib on Eliquis, ambulates with cane, HLD, HTN, colon cancer, PPM\par Smoking status: quit in 1963\par Current exercise: none\par Daily water intake: 50 oz\par Daily caffeine intake: 1-2 cups coffee\par OTC medications: ASA\par Family hx: pt denies any immediate family history cardiac disease\par Previous cardiac testing: unsure\par Previous hospitalizations: gout/cellulitis\par surgeries: colon cancer, partial nephrectomy 2006 benign mass\par

## 2021-12-15 NOTE — DISCUSSION/SUMMARY
[FreeTextEntry1] : Pt is a 89 y/o M who presents today for f/u. 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 \par The described plan was discussed with the pt and son.  All questions and concerns were addressed to the best of my knowledge. \par \par \par

## 2021-12-27 ENCOUNTER — APPOINTMENT (OUTPATIENT)
Dept: ELECTROPHYSIOLOGY | Facility: CLINIC | Age: 86
End: 2021-12-27
Payer: MEDICARE

## 2021-12-27 VITALS
RESPIRATION RATE: 14 BRPM | SYSTOLIC BLOOD PRESSURE: 126 MMHG | HEART RATE: 70 BPM | DIASTOLIC BLOOD PRESSURE: 63 MMHG | WEIGHT: 172 LBS | OXYGEN SATURATION: 100 % | BODY MASS INDEX: 29.37 KG/M2 | HEIGHT: 64 IN

## 2021-12-27 DIAGNOSIS — Z95.0 PRESENCE OF CARDIAC PACEMAKER: ICD-10-CM

## 2021-12-27 PROCEDURE — 93280 PM DEVICE PROGR EVAL DUAL: CPT

## 2022-01-20 ENCOUNTER — APPOINTMENT (OUTPATIENT)
Dept: FAMILY MEDICINE | Facility: CLINIC | Age: 87
End: 2022-01-20
Payer: MEDICARE

## 2022-01-20 VITALS
DIASTOLIC BLOOD PRESSURE: 58 MMHG | TEMPERATURE: 98.3 F | HEART RATE: 80 BPM | BODY MASS INDEX: 30.05 KG/M2 | RESPIRATION RATE: 16 BRPM | SYSTOLIC BLOOD PRESSURE: 122 MMHG | WEIGHT: 176 LBS | OXYGEN SATURATION: 98 % | HEIGHT: 64 IN

## 2022-01-20 DIAGNOSIS — Z87.438 PERSONAL HISTORY OF OTHER DISEASES OF MALE GENITAL ORGANS: ICD-10-CM

## 2022-01-20 DIAGNOSIS — Z13.31 ENCOUNTER FOR SCREENING FOR DEPRESSION: ICD-10-CM

## 2022-01-20 DIAGNOSIS — C44.40 UNSPECIFIED MALIGNANT NEOPLASM OF SKIN OF SCALP AND NECK: ICD-10-CM

## 2022-01-20 DIAGNOSIS — Z13.39 ENCOUNTER FOR SCREENING EXAM FOR OTHER MENTAL HEALTH AND BEHAVIORAL DISORDERS: ICD-10-CM

## 2022-01-20 DIAGNOSIS — R94.5 ABNORMAL RESULTS OF LIVER FUNCTION STUDIES: ICD-10-CM

## 2022-01-20 PROCEDURE — 36415 COLL VENOUS BLD VENIPUNCTURE: CPT

## 2022-01-20 PROCEDURE — G0439: CPT

## 2022-01-20 PROCEDURE — G0444 DEPRESSION SCREEN ANNUAL: CPT | Mod: 59

## 2022-01-20 PROCEDURE — G0442 ANNUAL ALCOHOL SCREEN 15 MIN: CPT

## 2022-01-20 NOTE — HISTORY OF PRESENT ILLNESS
[FreeTextEntry1] : annual [de-identified] : Mr. TAVIA RAYMUNDO is a 89 year old male presenting for an annual\par started taking B12.\par HTN BP is stable. On Amlodipine.\par HLD on Atorvastatin.\par A1C elevated, diet is okay.\par No complaints.\par Seen by cardiologist Dr Marquez 6/21\par Dermatology follow up for Skin Ca\par CKD, Seen by Dr Peralta, nephrologist 7/21. Renal panel done stable\par A Fib on Eliquis. Amidarone started by Dr Allred. rate controlled on Metoprolol\par Nocturia goes 3 times at night. On Tamsulosin\par

## 2022-01-20 NOTE — HEALTH RISK ASSESSMENT
[Good] : ~his/her~ current health as good [Never] : Never [Yes] : Yes [4 or more  times a week (4 pts)] : 4 or more  times a week (4 points) [1 or 2 (0 pts)] : 1 or 2 (0 points) [Never (0 pts)] : Never (0 points) [No] : In the past 12 months have you used drugs other than those required for medical reasons? No [No falls in past year] : Patient reported no falls in the past year [0] : 2) Feeling down, depressed, or hopeless: Not at all (0) [PHQ-2 Negative - No further assessment needed] : PHQ-2 Negative - No further assessment needed [None] : None [With Family] : lives with family [Retired] : retired [] :  [Fully functional (bathing, dressing, toileting, transferring, walking, feeding)] : Fully functional (bathing, dressing, toileting, transferring, walking, feeding) [Fully functional (using the telephone, shopping, preparing meals, housekeeping, doing laundry, using] : Fully functional and needs no help or supervision to perform IADLs (using the telephone, shopping, preparing meals, housekeeping, doing laundry, using transportation, managing medications and managing finances) [Reports changes in hearing] : Reports changes in hearing [Smoke Detector] : smoke detector [Carbon Monoxide Detector] : carbon monoxide detector [Seat Belt] :  uses seat belt [Sunscreen] : uses sunscreen [Reviewed no changes] : Reviewed, no changes [Name: ___] : Health Care Proxy's Name: [unfilled]  [Relationship: ___] : Relationship: [unfilled] [Audit-CScore] : 4 [[id number]] : 0 [Change in mental status noted] : No change in mental status noted [Sexually Active] : not sexually active [Reports changes in vision] : Reports no changes in vision [Reports changes in dental health] : Reports no changes in dental health [de-identified] : son and DIL [de-identified] : Had an evaluation did not want to get hearing aide [AdvancecareDate] : 1/2022

## 2022-01-20 NOTE — REVIEW OF SYSTEMS
[Joint Pain] : joint pain [Joint Stiffness] : joint stiffness [Back Pain] : back pain [Negative] : Heme/Lymph [Joint Swelling] : no joint swelling [Skin Rash] : no skin rash

## 2022-01-20 NOTE — ASSESSMENT
[FreeTextEntry1] : Annual\par SBIRT positive reduce frequency drinks daily.\par Depression screen negative\par Check labs, in office today not done by nephrologist\par EKG 12/21 in chart\par \par HLD on Atorvastatin.\par \par A1C elevated, diet is okay.\par No complaints.\par Seen by cardiologist Dr Marquez recently.\par \par Skin Ca Dr Bryant 10/21\par follow up annual now.\par \par Hypertension\par well controlled\par continue current meds. On Amlodipine\par \par Atrial fibrillation\par rate controlled\par On Eliquis. Amidarone started by Dr Allred. rate controlled on Metoprolol\par on Eliquis\par \par CKD: Dr Peralta last visit11/21\par Recent labs in chart\par Stable\par \par BPH: Nocturia goes 3 times at night. On Tamsulosin.\par See urologist\par will try [facility] [place]\par \par Iron Def on Iron Po 2 tabs a day.\par Follow up 3-4 months\par He states he started taking B12 recently.

## 2022-01-20 NOTE — PHYSICAL EXAM
[No Acute Distress] : no acute distress [Well Nourished] : well nourished [Well Developed] : well developed [Well-Appearing] : well-appearing [Normal Sclera/Conjunctiva] : normal sclera/conjunctiva [PERRL] : pupils equal round and reactive to light [EOMI] : extraocular movements intact [Normal Outer Ear/Nose] : the outer ears and nose were normal in appearance [Normal Oropharynx] : the oropharynx was normal [Normal TMs] : both tympanic membranes were normal [Supple] : supple [No Lymphadenopathy] : no lymphadenopathy [Thyroid Normal, No Nodules] : the thyroid was normal and there were no nodules present [No Respiratory Distress] : no respiratory distress  [Clear to Auscultation] : lungs were clear to auscultation bilaterally [No Accessory Muscle Use] : no accessory muscle use [Normal S1, S2] : normal S1 and S2 [Pedal Pulses Present] : the pedal pulses are present [No Edema] : there was no peripheral edema [Soft] : abdomen soft [Non Tender] : non-tender [Non-distended] : non-distended [No Masses] : no abdominal mass palpated [No HSM] : no HSM [Normal Bowel Sounds] : normal bowel sounds [Normal Posterior Cervical Nodes] : no posterior cervical lymphadenopathy [Normal Anterior Cervical Nodes] : no anterior cervical lymphadenopathy [No CVA Tenderness] : no CVA  tenderness [No Spinal Tenderness] : no spinal tenderness [No Joint Swelling] : no joint swelling [Grossly Normal Strength/Tone] : grossly normal strength/tone [Normal Gait] : normal gait [Coordination Grossly Intact] : coordination grossly intact [No Focal Deficits] : no focal deficits [Deep Tendon Reflexes (DTR)] : deep tendon reflexes were 2+ and symmetric [Normal Affect] : the affect was normal [Normal Insight/Judgement] : insight and judgment were intact [de-identified] : raised papule on the left side of the scalp temporal area. [de-identified] : bradycardia [de-identified] : rash on abdomen - erythematous, dry flaky skin

## 2022-01-28 ENCOUNTER — APPOINTMENT (OUTPATIENT)
Dept: FAMILY MEDICINE | Facility: CLINIC | Age: 87
End: 2022-01-28

## 2022-01-31 LAB
ALBUMIN SERPL ELPH-MCNC: 4.3 G/DL
ALP BLD-CCNC: 99 U/L
ALT SERPL-CCNC: 19 U/L
ANION GAP SERPL CALC-SCNC: 11 MMOL/L
AST SERPL-CCNC: 23 U/L
BILIRUB SERPL-MCNC: 0.7 MG/DL
BUN SERPL-MCNC: 37 MG/DL
CALCIUM SERPL-MCNC: 9.4 MG/DL
CHLORIDE SERPL-SCNC: 101 MMOL/L
CHOLEST SERPL-MCNC: 136 MG/DL
CO2 SERPL-SCNC: 28 MMOL/L
CREAT SERPL-MCNC: 2.24 MG/DL
ESTIMATED AVERAGE GLUCOSE: 134 MG/DL
FERRITIN SERPL-MCNC: 276 NG/ML
GLUCOSE SERPL-MCNC: 133 MG/DL
HBA1C MFR BLD HPLC: 6.3 %
HDLC SERPL-MCNC: 70 MG/DL
IRON SATN MFR SERPL: 16 %
IRON SERPL-MCNC: 39 UG/DL
LDLC SERPL CALC-MCNC: 57 MG/DL
NONHDLC SERPL-MCNC: 66 MG/DL
POTASSIUM SERPL-SCNC: 4.1 MMOL/L
PROT SERPL-MCNC: 6.2 G/DL
SODIUM SERPL-SCNC: 140 MMOL/L
TIBC SERPL-MCNC: 246 UG/DL
TRIGL SERPL-MCNC: 42 MG/DL
UIBC SERPL-MCNC: 207 UG/DL

## 2022-02-11 ENCOUNTER — APPOINTMENT (OUTPATIENT)
Dept: ELECTROPHYSIOLOGY | Facility: CLINIC | Age: 87
End: 2022-02-11

## 2022-03-09 ENCOUNTER — APPOINTMENT (OUTPATIENT)
Dept: NEPHROLOGY | Facility: CLINIC | Age: 87
End: 2022-03-09
Payer: MEDICARE

## 2022-03-09 VITALS
HEIGHT: 64 IN | WEIGHT: 162 LBS | SYSTOLIC BLOOD PRESSURE: 108 MMHG | OXYGEN SATURATION: 97 % | HEART RATE: 54 BPM | BODY MASS INDEX: 27.66 KG/M2 | DIASTOLIC BLOOD PRESSURE: 56 MMHG

## 2022-03-09 PROCEDURE — 99214 OFFICE O/P EST MOD 30 MIN: CPT

## 2022-03-09 NOTE — HISTORY OF PRESENT ILLNESS
[FreeTextEntry1] : Patient is an 89 year old man with past medical history of BPH, HTN, Gout, Pre-Diabetic, benign R sided renal mass s/p partial nephrectomy in 2006, here for follow up of CKD.\par \par Today, \par Pt seen and examined; states he feels good.\par No new complaint.\par Feels well.\par Labs and meds reviewed\par He was started on amiodarone by Dr. Marquez\par \par Lives with his son and daughter in law.\par \par \par \par \par \par \par

## 2022-03-27 ENCOUNTER — APPOINTMENT (OUTPATIENT)
Dept: ELECTROPHYSIOLOGY | Facility: CLINIC | Age: 87
End: 2022-03-27
Payer: MEDICARE

## 2022-03-28 ENCOUNTER — NON-APPOINTMENT (OUTPATIENT)
Age: 87
End: 2022-03-28

## 2022-03-28 PROCEDURE — 93294 REM INTERROG EVL PM/LDLS PM: CPT

## 2022-03-28 PROCEDURE — 93296 REM INTERROG EVL PM/IDS: CPT

## 2022-04-27 ENCOUNTER — APPOINTMENT (OUTPATIENT)
Dept: INTERNAL MEDICINE | Facility: CLINIC | Age: 87
End: 2022-04-27
Payer: MEDICARE

## 2022-04-27 VITALS
WEIGHT: 157 LBS | HEIGHT: 64 IN | SYSTOLIC BLOOD PRESSURE: 98 MMHG | BODY MASS INDEX: 26.8 KG/M2 | DIASTOLIC BLOOD PRESSURE: 58 MMHG | OXYGEN SATURATION: 97 % | HEART RATE: 99 BPM | RESPIRATION RATE: 16 BRPM | TEMPERATURE: 98.5 F

## 2022-04-27 DIAGNOSIS — R63.4 ABNORMAL WEIGHT LOSS: ICD-10-CM

## 2022-04-27 DIAGNOSIS — R63.0 ANOREXIA: ICD-10-CM

## 2022-04-27 PROCEDURE — 36415 COLL VENOUS BLD VENIPUNCTURE: CPT

## 2022-04-27 PROCEDURE — 99215 OFFICE O/P EST HI 40 MIN: CPT | Mod: 25

## 2022-04-28 ENCOUNTER — INPATIENT (INPATIENT)
Facility: HOSPITAL | Age: 87
LOS: 2 days | Discharge: HOME | DRG: 683 | End: 2022-05-01
Attending: INTERNAL MEDICINE | Admitting: INTERNAL MEDICINE
Payer: MEDICARE

## 2022-04-28 VITALS
DIASTOLIC BLOOD PRESSURE: 60 MMHG | TEMPERATURE: 98 F | RESPIRATION RATE: 16 BRPM | SYSTOLIC BLOOD PRESSURE: 103 MMHG | OXYGEN SATURATION: 99 % | HEART RATE: 89 BPM | HEIGHT: 65 IN | WEIGHT: 156.97 LBS

## 2022-04-28 DIAGNOSIS — Z95.0 PRESENCE OF CARDIAC PACEMAKER: Chronic | ICD-10-CM

## 2022-04-28 DIAGNOSIS — N17.9 ACUTE KIDNEY FAILURE, UNSPECIFIED: ICD-10-CM

## 2022-04-28 LAB
ALBUMIN SERPL ELPH-MCNC: 3.3 G/DL — SIGNIFICANT CHANGE UP (ref 3.3–5)
ALBUMIN SERPL ELPH-MCNC: 4.2 G/DL
ALP BLD-CCNC: 101 U/L
ALP SERPL-CCNC: 90 U/L — SIGNIFICANT CHANGE UP (ref 40–120)
ALT FLD-CCNC: 34 U/L — SIGNIFICANT CHANGE UP (ref 12–78)
ALT SERPL-CCNC: 25 U/L
ANION GAP SERPL CALC-SCNC: 15 MMOL/L
ANION GAP SERPL CALC-SCNC: 4 MMOL/L — LOW (ref 5–17)
APPEARANCE UR: CLEAR — SIGNIFICANT CHANGE UP
AST SERPL-CCNC: 24 U/L — SIGNIFICANT CHANGE UP (ref 15–37)
AST SERPL-CCNC: 26 U/L
BASOPHILS # BLD AUTO: 0.05 K/UL
BASOPHILS # BLD AUTO: 0.05 K/UL — SIGNIFICANT CHANGE UP (ref 0–0.2)
BASOPHILS NFR BLD AUTO: 0.9 %
BASOPHILS NFR BLD AUTO: 1 % — SIGNIFICANT CHANGE UP (ref 0–2)
BILIRUB SERPL-MCNC: 0.6 MG/DL
BILIRUB SERPL-MCNC: 0.7 MG/DL — SIGNIFICANT CHANGE UP (ref 0.2–1.2)
BILIRUB UR-MCNC: NEGATIVE — SIGNIFICANT CHANGE UP
BUN SERPL-MCNC: 61 MG/DL — HIGH (ref 7–23)
BUN SERPL-MCNC: 62 MG/DL
CALCIUM SERPL-MCNC: 8.8 MG/DL — SIGNIFICANT CHANGE UP (ref 8.5–10.1)
CALCIUM SERPL-MCNC: 9.6 MG/DL
CHLORIDE SERPL-SCNC: 102 MMOL/L
CHLORIDE SERPL-SCNC: 102 MMOL/L — SIGNIFICANT CHANGE UP (ref 96–108)
CO2 SERPL-SCNC: 26 MMOL/L
CO2 SERPL-SCNC: 31 MMOL/L — SIGNIFICANT CHANGE UP (ref 22–31)
COLOR SPEC: YELLOW — SIGNIFICANT CHANGE UP
CREAT SERPL-MCNC: 3.56 MG/DL
CREAT SERPL-MCNC: 3.8 MG/DL — HIGH (ref 0.5–1.3)
DIFF PNL FLD: NEGATIVE — SIGNIFICANT CHANGE UP
EGFR: 14 ML/MIN/1.73M2 — LOW
EGFR: 15 ML/MIN/1.73M2
EOSINOPHIL # BLD AUTO: 0.05 K/UL
EOSINOPHIL # BLD AUTO: 0.05 K/UL — SIGNIFICANT CHANGE UP (ref 0–0.5)
EOSINOPHIL NFR BLD AUTO: 0.9 %
EOSINOPHIL NFR BLD AUTO: 1 % — SIGNIFICANT CHANGE UP (ref 0–6)
GLUCOSE SERPL-MCNC: 124 MG/DL
GLUCOSE SERPL-MCNC: 205 MG/DL — HIGH (ref 70–99)
GLUCOSE UR QL: NEGATIVE — SIGNIFICANT CHANGE UP
HCT VFR BLD CALC: 31.8 % — LOW (ref 39–50)
HCT VFR BLD CALC: 33.9 %
HGB BLD-MCNC: 10.4 G/DL — LOW (ref 13–17)
HGB BLD-MCNC: 11 G/DL
IMM GRANULOCYTES NFR BLD AUTO: 0.9 %
IMM GRANULOCYTES NFR BLD AUTO: 1.3 % — SIGNIFICANT CHANGE UP (ref 0–1.5)
KETONES UR-MCNC: NEGATIVE — SIGNIFICANT CHANGE UP
LEUKOCYTE ESTERASE UR-ACNC: ABNORMAL
LYMPHOCYTES # BLD AUTO: 0.75 K/UL — LOW (ref 1–3.3)
LYMPHOCYTES # BLD AUTO: 0.99 K/UL
LYMPHOCYTES # BLD AUTO: 14.3 % — SIGNIFICANT CHANGE UP (ref 13–44)
LYMPHOCYTES NFR BLD AUTO: 17.4 %
MAN DIFF?: NORMAL
MCHC RBC-ENTMCNC: 31.9 PG
MCHC RBC-ENTMCNC: 31.9 PG — SIGNIFICANT CHANGE UP (ref 27–34)
MCHC RBC-ENTMCNC: 32.4 GM/DL
MCHC RBC-ENTMCNC: 32.7 GM/DL — SIGNIFICANT CHANGE UP (ref 32–36)
MCV RBC AUTO: 97.5 FL — SIGNIFICANT CHANGE UP (ref 80–100)
MCV RBC AUTO: 98.3 FL
MONOCYTES # BLD AUTO: 0.4 K/UL
MONOCYTES # BLD AUTO: 0.47 K/UL — SIGNIFICANT CHANGE UP (ref 0–0.9)
MONOCYTES NFR BLD AUTO: 7 %
MONOCYTES NFR BLD AUTO: 9 % — SIGNIFICANT CHANGE UP (ref 2–14)
NEUTROPHILS # BLD AUTO: 3.86 K/UL — SIGNIFICANT CHANGE UP (ref 1.8–7.4)
NEUTROPHILS # BLD AUTO: 4.15 K/UL
NEUTROPHILS NFR BLD AUTO: 72.9 %
NEUTROPHILS NFR BLD AUTO: 73.4 % — SIGNIFICANT CHANGE UP (ref 43–77)
NITRITE UR-MCNC: NEGATIVE — SIGNIFICANT CHANGE UP
PH UR: 6 — SIGNIFICANT CHANGE UP (ref 5–8)
PLATELET # BLD AUTO: 218 K/UL — SIGNIFICANT CHANGE UP (ref 150–400)
PLATELET # BLD AUTO: 249 K/UL
POTASSIUM SERPL-MCNC: 3.6 MMOL/L — SIGNIFICANT CHANGE UP (ref 3.5–5.3)
POTASSIUM SERPL-SCNC: 3.6 MMOL/L — SIGNIFICANT CHANGE UP (ref 3.5–5.3)
POTASSIUM SERPL-SCNC: 4.4 MMOL/L
PROT SERPL-MCNC: 6.2 G/DL
PROT SERPL-MCNC: 6.2 GM/DL — SIGNIFICANT CHANGE UP (ref 6–8.3)
PROT UR-MCNC: NEGATIVE — SIGNIFICANT CHANGE UP
RBC # BLD: 3.26 M/UL — LOW (ref 4.2–5.8)
RBC # BLD: 3.45 M/UL
RBC # FLD: 15.8 % — HIGH (ref 10.3–14.5)
RBC # FLD: 16.1 %
SODIUM SERPL-SCNC: 137 MMOL/L — SIGNIFICANT CHANGE UP (ref 135–145)
SODIUM SERPL-SCNC: 142 MMOL/L
SP GR SPEC: 1.01 — SIGNIFICANT CHANGE UP (ref 1.01–1.02)
UROBILINOGEN FLD QL: NEGATIVE — SIGNIFICANT CHANGE UP
WBC # BLD: 5.25 K/UL — SIGNIFICANT CHANGE UP (ref 3.8–10.5)
WBC # FLD AUTO: 5.25 K/UL — SIGNIFICANT CHANGE UP (ref 3.8–10.5)
WBC # FLD AUTO: 5.69 K/UL

## 2022-04-28 PROCEDURE — 80048 BASIC METABOLIC PNL TOTAL CA: CPT

## 2022-04-28 PROCEDURE — 71045 X-RAY EXAM CHEST 1 VIEW: CPT | Mod: 26

## 2022-04-28 PROCEDURE — 99497 ADVNCD CARE PLAN 30 MIN: CPT | Mod: 25

## 2022-04-28 PROCEDURE — 82962 GLUCOSE BLOOD TEST: CPT

## 2022-04-28 PROCEDURE — 97116 GAIT TRAINING THERAPY: CPT | Mod: GP

## 2022-04-28 PROCEDURE — 99223 1ST HOSP IP/OBS HIGH 75: CPT

## 2022-04-28 PROCEDURE — 97163 PT EVAL HIGH COMPLEX 45 MIN: CPT | Mod: GP

## 2022-04-28 PROCEDURE — 85027 COMPLETE CBC AUTOMATED: CPT

## 2022-04-28 PROCEDURE — 93010 ELECTROCARDIOGRAM REPORT: CPT

## 2022-04-28 PROCEDURE — 99285 EMERGENCY DEPT VISIT HI MDM: CPT

## 2022-04-28 PROCEDURE — 36415 COLL VENOUS BLD VENIPUNCTURE: CPT

## 2022-04-28 PROCEDURE — 74176 CT ABD & PELVIS W/O CONTRAST: CPT | Mod: 26,MA

## 2022-04-28 PROCEDURE — 85610 PROTHROMBIN TIME: CPT

## 2022-04-28 PROCEDURE — 85730 THROMBOPLASTIN TIME PARTIAL: CPT

## 2022-04-28 RX ORDER — AMLODIPINE BESYLATE 2.5 MG/1
5 TABLET ORAL DAILY
Refills: 0 | Status: DISCONTINUED | OUTPATIENT
Start: 2022-04-28 | End: 2022-05-01

## 2022-04-28 RX ORDER — FERROUS SULFATE 325(65) MG
2 TABLET ORAL
Qty: 0 | Refills: 0 | DISCHARGE

## 2022-04-28 RX ORDER — FOLIC ACID 0.8 MG
1 TABLET ORAL DAILY
Refills: 0 | Status: DISCONTINUED | OUTPATIENT
Start: 2022-04-28 | End: 2022-04-28

## 2022-04-28 RX ORDER — CHOLECALCIFEROL (VITAMIN D3) 125 MCG
2000 CAPSULE ORAL DAILY
Refills: 0 | Status: DISCONTINUED | OUTPATIENT
Start: 2022-04-28 | End: 2022-05-01

## 2022-04-28 RX ORDER — ACETAMINOPHEN 500 MG
650 TABLET ORAL EVERY 6 HOURS
Refills: 0 | Status: DISCONTINUED | OUTPATIENT
Start: 2022-04-28 | End: 2022-05-01

## 2022-04-28 RX ORDER — PREGABALIN 225 MG/1
1000 CAPSULE ORAL DAILY
Refills: 0 | Status: DISCONTINUED | OUTPATIENT
Start: 2022-04-28 | End: 2022-05-01

## 2022-04-28 RX ORDER — GLUCAGON INJECTION, SOLUTION 0.5 MG/.1ML
1 INJECTION, SOLUTION SUBCUTANEOUS ONCE
Refills: 0 | Status: DISCONTINUED | OUTPATIENT
Start: 2022-04-28 | End: 2022-05-01

## 2022-04-28 RX ORDER — TAMSULOSIN HYDROCHLORIDE 0.4 MG/1
1 CAPSULE ORAL
Qty: 0 | Refills: 0 | DISCHARGE

## 2022-04-28 RX ORDER — APIXABAN 2.5 MG/1
2.5 TABLET, FILM COATED ORAL
Refills: 0 | Status: DISCONTINUED | OUTPATIENT
Start: 2022-04-28 | End: 2022-05-01

## 2022-04-28 RX ORDER — DEXTROSE 50 % IN WATER 50 %
25 SYRINGE (ML) INTRAVENOUS ONCE
Refills: 0 | Status: DISCONTINUED | OUTPATIENT
Start: 2022-04-28 | End: 2022-05-01

## 2022-04-28 RX ORDER — PREGABALIN 225 MG/1
1 CAPSULE ORAL
Qty: 0 | Refills: 0 | DISCHARGE

## 2022-04-28 RX ORDER — FERROUS SULFATE 325(65) MG
325 TABLET ORAL
Refills: 0 | Status: DISCONTINUED | OUTPATIENT
Start: 2022-04-28 | End: 2022-05-01

## 2022-04-28 RX ORDER — SODIUM CHLORIDE 9 MG/ML
1000 INJECTION INTRAMUSCULAR; INTRAVENOUS; SUBCUTANEOUS ONCE
Refills: 0 | Status: COMPLETED | OUTPATIENT
Start: 2022-04-28 | End: 2022-04-28

## 2022-04-28 RX ORDER — CHOLECALCIFEROL (VITAMIN D3) 125 MCG
1 CAPSULE ORAL
Qty: 0 | Refills: 0 | DISCHARGE

## 2022-04-28 RX ORDER — APIXABAN 2.5 MG/1
1 TABLET, FILM COATED ORAL
Qty: 0 | Refills: 0 | DISCHARGE

## 2022-04-28 RX ORDER — PYRIDOXINE HCL (VITAMIN B6) 100 MG
1 TABLET ORAL
Qty: 0 | Refills: 0 | DISCHARGE

## 2022-04-28 RX ORDER — DEXTROSE 50 % IN WATER 50 %
15 SYRINGE (ML) INTRAVENOUS ONCE
Refills: 0 | Status: DISCONTINUED | OUTPATIENT
Start: 2022-04-28 | End: 2022-05-01

## 2022-04-28 RX ORDER — ATORVASTATIN CALCIUM 80 MG/1
1 TABLET, FILM COATED ORAL
Qty: 0 | Refills: 0 | DISCHARGE

## 2022-04-28 RX ORDER — SODIUM CHLORIDE 9 MG/ML
1000 INJECTION, SOLUTION INTRAVENOUS
Refills: 0 | Status: DISCONTINUED | OUTPATIENT
Start: 2022-04-28 | End: 2022-05-01

## 2022-04-28 RX ORDER — INSULIN LISPRO 100/ML
VIAL (ML) SUBCUTANEOUS
Refills: 0 | Status: DISCONTINUED | OUTPATIENT
Start: 2022-04-28 | End: 2022-05-01

## 2022-04-28 RX ORDER — AMLODIPINE BESYLATE 2.5 MG/1
1 TABLET ORAL
Qty: 0 | Refills: 0 | DISCHARGE

## 2022-04-28 RX ORDER — PYRIDOXINE HCL (VITAMIN B6) 100 MG
50 TABLET ORAL DAILY
Refills: 0 | Status: DISCONTINUED | OUTPATIENT
Start: 2022-04-28 | End: 2022-05-01

## 2022-04-28 RX ORDER — DEXTROSE 50 % IN WATER 50 %
12.5 SYRINGE (ML) INTRAVENOUS ONCE
Refills: 0 | Status: DISCONTINUED | OUTPATIENT
Start: 2022-04-28 | End: 2022-05-01

## 2022-04-28 RX ORDER — PANTOPRAZOLE SODIUM 20 MG/1
40 TABLET, DELAYED RELEASE ORAL
Refills: 0 | Status: DISCONTINUED | OUTPATIENT
Start: 2022-04-28 | End: 2022-05-01

## 2022-04-28 RX ORDER — POLYETHYLENE GLYCOL 3350 17 G/17G
17 POWDER, FOR SOLUTION ORAL DAILY
Refills: 0 | Status: DISCONTINUED | OUTPATIENT
Start: 2022-04-28 | End: 2022-05-01

## 2022-04-28 RX ORDER — ATORVASTATIN CALCIUM 80 MG/1
10 TABLET, FILM COATED ORAL AT BEDTIME
Refills: 0 | Status: DISCONTINUED | OUTPATIENT
Start: 2022-04-28 | End: 2022-05-01

## 2022-04-28 RX ORDER — AMIODARONE HYDROCHLORIDE 400 MG/1
200 TABLET ORAL DAILY
Refills: 0 | Status: DISCONTINUED | OUTPATIENT
Start: 2022-04-28 | End: 2022-05-01

## 2022-04-28 RX ORDER — PANTOPRAZOLE SODIUM 20 MG/1
1 TABLET, DELAYED RELEASE ORAL
Qty: 0 | Refills: 0 | DISCHARGE

## 2022-04-28 RX ORDER — METOPROLOL TARTRATE 50 MG
1 TABLET ORAL
Qty: 0 | Refills: 0 | DISCHARGE

## 2022-04-28 RX ORDER — AMIODARONE HYDROCHLORIDE 400 MG/1
1 TABLET ORAL
Qty: 0 | Refills: 0 | DISCHARGE

## 2022-04-28 RX ORDER — SODIUM CHLORIDE 9 MG/ML
1000 INJECTION INTRAMUSCULAR; INTRAVENOUS; SUBCUTANEOUS
Refills: 0 | Status: DISCONTINUED | OUTPATIENT
Start: 2022-04-28 | End: 2022-05-01

## 2022-04-28 RX ORDER — THIAMINE MONONITRATE (VIT B1) 100 MG
100 TABLET ORAL DAILY
Refills: 0 | Status: DISCONTINUED | OUTPATIENT
Start: 2022-04-28 | End: 2022-04-28

## 2022-04-28 RX ORDER — METOPROLOL TARTRATE 50 MG
25 TABLET ORAL AT BEDTIME
Refills: 0 | Status: DISCONTINUED | OUTPATIENT
Start: 2022-04-28 | End: 2022-05-01

## 2022-04-28 RX ORDER — TAMSULOSIN HYDROCHLORIDE 0.4 MG/1
0.4 CAPSULE ORAL AT BEDTIME
Refills: 0 | Status: DISCONTINUED | OUTPATIENT
Start: 2022-04-28 | End: 2022-05-01

## 2022-04-28 RX ADMIN — ATORVASTATIN CALCIUM 10 MILLIGRAM(S): 80 TABLET, FILM COATED ORAL at 23:29

## 2022-04-28 RX ADMIN — APIXABAN 2.5 MILLIGRAM(S): 2.5 TABLET, FILM COATED ORAL at 23:28

## 2022-04-28 RX ADMIN — TAMSULOSIN HYDROCHLORIDE 0.4 MILLIGRAM(S): 0.4 CAPSULE ORAL at 23:29

## 2022-04-28 RX ADMIN — SODIUM CHLORIDE 1000 MILLILITER(S): 9 INJECTION INTRAMUSCULAR; INTRAVENOUS; SUBCUTANEOUS at 16:03

## 2022-04-28 RX ADMIN — SODIUM CHLORIDE 70 MILLILITER(S): 9 INJECTION INTRAMUSCULAR; INTRAVENOUS; SUBCUTANEOUS at 23:28

## 2022-04-28 NOTE — H&P ADULT - RESPIRATORY
3.50*   HGB  10.8*   HCT  32.4*   PLT  386     Last 3 CMP  Recent Labs      06/17/18   1849  06/18/18   0627  06/19/18   0528   NA  136  137  139   K  5.3*  5.5*  5.3*   CL  96*  99  102   CO2  26  25  25   BUN  56*  57*  52*   CREATININE  1.7*  1.8*  1.5*   CALCIUM  9.1  8.9  8.8        Assessment / Plan   Renal - BEREKET mostly prerenal from poor oral intake and use of diuretics  · Urine lytes, give a trial of IVF . · Decrease IVF to 50 ml/min  · BMP in AM           Hyperkalemia - 2/2 BEREKET and diet, low k diet and follow . She is getting dietary supplements  Essential hypertension- stable. Fracture left hip - S/P ORIF 6/11  meds reviewed and D/W patient and RN    RONALDO Lund D.  [facility]. detailed exam

## 2022-04-28 NOTE — ED ADULT NURSE NOTE - NS ED NURSE PATIENT LEFT UNIT TIME
22:22 Initiate Treatment: Biopsy performed today\\Radha home, start doxycycline 100mg by mouth twice daily (take with meal and full glass of liquid, be very careful with sun exposure as can sunburn more easily).\\nStart clobetasol 0.05% foam. Apply to affected area on scalp 1-2 times daily as needed. Do not apply to face Detail Level: Simple

## 2022-04-28 NOTE — ED PROVIDER NOTE - ATTENDING CONTRIBUTION TO CARE
Dr. Mahmood: I have personally performed a face to face bedside history and physical examination of this patient. I have discussed the history, examination, review of systems, assessment and plan of management with the resident. I have reviewed the electronic medical record and amended it to reflect my history, review of systems, physical exam, assessment and plan.

## 2022-04-28 NOTE — ED PROVIDER NOTE - PROGRESS NOTE DETAILS
Scribe SK for ED attending Dr. Mahmood: paging renal on call Dr. Read Palmira WU for ED attending Dr. Mahmood: Spoke with Dr. Puentes - will follow inpatient consult, advises CT abd/pelvis noncon, urine osmolality, urine creatinine Palmira WU for ED attending Dr. Mahmood: Spoke with Dr. Puentes - will follow as inpatient consultant, advises CT abd/pelvis noncon, urine osmolality, urine creatinine Palm[place] WU for ED attending Dr. Mahmood: hospitalist called for admission, phone message left

## 2022-04-28 NOTE — PATIENT PROFILE ADULT - FUNCTIONAL ASSESSMENT - BASIC MOBILITY 1.
"Problem: Goal Outcome Summary  Goal: Goal Outcome Summary  Outcome: No Change  /67 mmHg  Temp(Src) 97.7  F (36.5  C) (Axillary)  Resp 14  Ht 1.626 m (5' 4\")  Wt 61.5 kg (135 lb 9.3 oz)  BMI 23.26 kg/m2  SpO2 98%    VS: VSS  Neuro: A&Ox3, disoriented to time. Said it was 1927.  Cardiac: SR/ST  Respiratory: Sating 98% on 40% FiO2 trach. Bivona 6. Trach cares done.  GI: Multiple loose BM's, incontinent. Has brief on.  : Adequate urine output - bender cath  IV Access: R PIV SL'd  Drains/tubes: GJ tube. G to gravity, J has TF at 85 ml/hr, 125ml q4h flush.  Diet/appetite: NPO, ice chips ok  Activity:  Ax2  Pain: Back and abdominal pain, has dilaudid  Prn q3h  Skin: Blanchable redness on coccyx  Labs: Replaced Mg and K, recheck in AM  Has ativan prn q3h for anxiety. States she can't breath. Discussed with RT, and they agreed her respiratory status is stable. Pt air hungry. RN can call team if anxiety meds are not enough to keep pt comfortable.  Palliative came to see pt, and pt discussed with daughter she would like to be DNR. Palliative will discuss with team who should place DNR order.   Dietary will be adding fiber to help with loose BMs, and possibly add prn imodium. Will update nursing and team as applicable.    R: Continue with POC. Notify primary team with changes.            " 4 = No assist / stand by assistance

## 2022-04-28 NOTE — H&P ADULT - CONVERSATION DETAILS
17 min spent discussing advanced care planning. Pt states that he is a DNR/DNI and would like to allow natural death should it occur and does not want intubation or cpr under any circumstances. Pt is okay with IV fluids, medications, pressors, non  invasive ventilation.

## 2022-04-28 NOTE — ED ADULT NURSE REASSESSMENT NOTE - NS ED NURSE REASSESS COMMENT FT1
Report received from Josefina PAYNE RN. Patient ambulated to bathroom with minimal assistance. No complaints of pain or discomfort at this time. Patient asking for something to eat, advised we have to wait for CT-scan to come back, will keep patient updated on when he'll be able to eat. Safety maintained.

## 2022-04-28 NOTE — PHARMACOTHERAPY INTERVENTION NOTE - COMMENTS
Medication reconciliation completed.  Patient provided list of current medication; confirmed with Dr. First [facility].  Pt confirms that he took morning and afternoon doses.

## 2022-04-28 NOTE — H&P ADULT - HISTORY OF PRESENT ILLNESS
Pt is a 89 yo male with a  pmh/o BPH, UTI, DM2 diet controlled, CKD4, PVD, HTN, afib on Eliquis anemia, gout, spinal stenosis, OA, heart block/bradycardia s/p PPM, dilated cardiomyopathy with EF 70%, previously a daily etoh of wine/beer, who presents to ED due to abnormal outpatient labs. Pt states he "feels fine" but has been under increased stress lately and had noticed he has been more tired over past week. Pt also endorsed that he has been feeling abdominal discomfort, midline, not quantifiable on pain scale, non radiating, no a/a factors, intermittent. Pt also notes that sometimes with urination there is a white color to urine. States he knows something is wrong with his kidneys and endorses that he tries to drink plenty of water but it is difficult because he is on lasix and has to urinate frequently.     Denies chest pain, nausea, vomiting, diarrhea, palpations, HA, dysuria, hematuria, incontinence, cough, fever, shortness of breath, leg swelling, claudication, orthopnea, rash, paresthesias, gait change, changes in vision/hearing/speech.

## 2022-04-28 NOTE — HISTORY OF PRESENT ILLNESS
[FreeTextEntry8] : Mr. TAVIA RAYMUNDO  is    89 year male . \par He is pt. of .\par Patient is here with his daughter-in-law who stated that he have a loss of appetite for last 3 months has lost weight almost 19 pounds since January.\par He stated 2 weeks ago while he was cleaning his throat in the morning he saw brownish color stuff coming out from the throat, had twice in the last couple of weeks.\par Denied any sore throat or upper respiratory illness.\par He suffers with acid reflux on pantoprazole 40 mg.\par Takes iron supplement for anemia said his stool is always dark black in color.\par Had some abdominal discomfort as well, stated he feels better now.\par Daughter-in-law have a concern of his loss of appetite and weight, she also mentioned that there is a lot of stress going on in the family.\par

## 2022-04-28 NOTE — ED ADULT NURSE NOTE - NSIMPLEMENTINTERV_GEN_ALL_ED
Implemented All Fall with Harm Risk Interventions:  [place] to call system. Call bell, personal items and telephone within reach. Instruct patient to call for assistance. Room bathroom lighting operational. Non-slip footwear when patient is off stretcher. Physically safe environment: no spills, clutter or unnecessary equipment. Stretcher in lowest position, wheels locked, appropriate side rails in place. Provide visual cue, wrist band, yellow gown, etc. Monitor gait and stability. Monitor for mental status changes and reorient to person, place, and time. Review medications for side effects contributing to fall risk. Reinforce activity limits and safety measures with patient and family. Provide visual clues: red socks.

## 2022-04-28 NOTE — ED ADULT TRIAGE NOTE - CHIEF COMPLAINT QUOTE
pt presents to ED due to complaints of weakness with abdominal pain x 3 weeks lives with daughter pt also having complaints of coughing up blood seen by PMD and advised to come to ED due to elevated kidney function

## 2022-04-28 NOTE — ED ADULT NURSE NOTE - NS ED PATIENT SAFETY CONCERN
Nonrheumatic aortic valve stenosis Nonrheumatic aortic valve stenosis Nonrheumatic aortic valve stenosis Nonrheumatic aortic valve stenosis Nonrheumatic aortic valve stenosis Nonrheumatic aortic valve stenosis No

## 2022-04-28 NOTE — ED PROVIDER NOTE - EKG ADDITIONAL QUESTION - PERFORMED INDEPENDENT VISUALIZATION
February 14, 2020        KAMILA Odom  [address] [address]  Phone: [phone]  Fax: [phone]             [facility] [address]  [facility] [address]  Phone: [phone]   Patient: Rigo Palencia   MR Number: [id number]   YOB: 1955   Date of Visit: 2/14/2020       Dear Dr. KAMILA Odom    Thank you for referring Rigo Palencia to me for evaluation. Attached you will find relevant portions of my assessment and plan of care.    If you have questions, please do not hesitate to call me. I look forward to following Rigo Palencia along with you.    Sincerely,    Sunny Feldman MD    Enclosure    If you would like to receive this communication electronically, please contact [email or web address] or [phone] to request [employer] access.    [employer] is a tool which provides read-only access to select patient information with whom you have a relationship. Its easy to use and provides real time access to review your patients record including encounter summaries, notes, results, and demographic information.    If you feel you have received this communication in error or would no longer like to receive these types of communications, please e-mail [email or web address]       
Yes

## 2022-04-28 NOTE — ASSESSMENT
[FreeTextEntry1] : Patient is here with his daughter-in-law presented for  loss of appetite for last 3 months has lost weight almost 19 pounds since January.\par Order CAT scan of abdomen.\par Ordered CBC and CMP.\par Patient is on Ecotrin 81 mg and Eliquis for atrial fibrillation.\par As he complained about coughing blood in the sputum advised to hold onto Ecotrin until he sees his cardiology.\par Advised if he sees any more episodes of bleeding should go to the ER.\par \par Hypertension:\par Blood pressure is kind of in the lower range of 98/58, he is tachycardic heart rate of 99.\par He is on a amlodipine 5 mg, furosemide 20 mg, metoprolol succinate 25 mg once a day\par Advised to increase hydration.\par \par Followed lab results today which revealed decreased in GFR since January.\par Patient currently is on acute on chronic renal failure.,  He sees nephrology Dr. Peralta.\par Called daughter-in-law to discuss the lab results and asked her to call Dr. Peralta and take patient to the ER for acute deterioration of kidney function.\par .\par

## 2022-04-28 NOTE — ED PROVIDER NOTE - MUSCULOSKELETAL, MLM
Spine appears normal, range of motion is not limited, no muscle or joint tenderness, LOZANO x4, no focal swelling/tenderness

## 2022-04-28 NOTE — ED PROVIDER NOTE - OBJECTIVE STATEMENT
89 y/o male with PMHx of BPH, UTI, DM Type 2, CKD, PVD, HTN, Afib on Eliquis, anemia, gout, +pacemaker, spinal stenosis presents to ED c/o generalized weakness and mid abd pain for past 3 weeks. Pt had lab work done with elevated kidney functions, baseline creatinine 1.1, today creatinine 3.0. Pt reports he is under increased stress recently, noticed some brown blood while clearing throat and increased reflux, denies difficulty breathing, difficulty urinating, hematemesis, hemoptysis. Reports abdominal pain is mid-abd, constant, non-radiating, and mild associated with mild nausea, pain not aggravated by eating. Pt also reports increased fatigue and KING. Denies chest pain, SOB, cough. PMD: Dr. Suggs. Allergies: penicillin, shellfish. Vaccinated for COVID - pfizer x 3, wife says PCP is Dr. Alcaraz in [place].

## 2022-04-28 NOTE — ED ADULT NURSE NOTE - OBJECTIVE STATEMENT
PT to ED from home for c/o abdominal pain for multiple days. PT also was told by PMD that he had elevated creatinine levels and was instructed to come to ED for further evaluation. Denies fever, chills, nausea, vomiting, and diarrhea.

## 2022-04-28 NOTE — H&P ADULT - ASSESSMENT
89 yo male with a  pmh/o BPH, CKD4, who presents to ED due to abnormal outpatient labs in setting of abdominal discomfort and weakness, admitted due to:    #LUIS ALFREDO on CKD4  likely secondary to dehydration, obstructive uropathy 2/2 BPH  admit to med surg  nephrology consulted  CT imaging with renal cyst which may be loculated, distended bladder & ureter fullness, may require biopsy of cyst  strict i/o's  gentle hydration at 70cc/hr x 1 day  s/p 1L bolus in ED  urine studies ordered    #Anemia  c/w iron supplement  h/h around baseline  c/w AC with close monitoring of h/h    #DMII with hyperglycemia  diet controlled  AISS  Carb restriction  POC qAC/HS    #Asymptomatic pyuria  hold abx, pt afebrile  f/u urine culture    #ETOH use  states no longer drinks daily  no evidence of WD  c/w supplements    #Dilated cardiomyopathy  #HTN/HLD  #Afib s/p PPM  c/w norvasc  c/w pacerone  c/w lipitor  c/w metoprolol  DASH/TLC rest.     #Gout  hold allopurinol due to LUIS ALFREDO  reinstate when fxn improves

## 2022-04-28 NOTE — ED PROVIDER NOTE - CONSTITUTIONAL, MLM
normal... elderly white male, alert, no respiratory discomfort, nontoxic, no acute distress, no sentence shortening

## 2022-04-28 NOTE — ED PROVIDER NOTE - ENMT, MLM
Airway patent, Nasal mucosa clear. Throat has no vesicles, no oropharyngeal exudates and uvula is midline, mildly dry mucous membranes, +upper denture

## 2022-04-28 NOTE — ED PROVIDER NOTE - CLINICAL SUMMARY MEDICAL DECISION MAKING FREE TEXT BOX
89 y/o male with PMHx of BPH, UTI, DM Type 2, CKD, PVD, HTN, Afib on Eliquis, anemia, gout, +pacemaker, spinal stenosis presents to ED c/o generalized weakness and mid abd pain for past 3 weeks. Pt had lab work done with elevated kidney functions, baseline creatinine 1.1, today creatinine 3.0. VSS, no respiratory discomfort, not hypoxic, abdomen benign, pt appears mildly dehydrated.     Plan: EKG, CXR, labs including urine with culture, IVF, monitor observe reassess, renal consult, expect admission for LUIS ALFREDO

## 2022-04-28 NOTE — PATIENT PROFILE ADULT - FALL HARM RISK - HARM RISK INTERVENTIONS

## 2022-04-29 LAB
ANION GAP SERPL CALC-SCNC: 6 MMOL/L — SIGNIFICANT CHANGE UP (ref 5–17)
APTT BLD: 35.6 SEC — HIGH (ref 27.5–35.5)
BUN SERPL-MCNC: 58 MG/DL — HIGH (ref 7–23)
CALCIUM SERPL-MCNC: 8.7 MG/DL — SIGNIFICANT CHANGE UP (ref 8.5–10.1)
CHLORIDE SERPL-SCNC: 107 MMOL/L — SIGNIFICANT CHANGE UP (ref 96–108)
CO2 SERPL-SCNC: 27 MMOL/L — SIGNIFICANT CHANGE UP (ref 22–31)
CREAT SERPL-MCNC: 3.38 MG/DL — HIGH (ref 0.5–1.3)
EGFR: 16 ML/MIN/1.73M2 — LOW
GLUCOSE SERPL-MCNC: 151 MG/DL — HIGH (ref 70–99)
HCT VFR BLD CALC: 32.5 % — LOW (ref 39–50)
HGB BLD-MCNC: 10.5 G/DL — LOW (ref 13–17)
INR BLD: 1.54 RATIO — HIGH (ref 0.88–1.16)
MCHC RBC-ENTMCNC: 31.7 PG — SIGNIFICANT CHANGE UP (ref 27–34)
MCHC RBC-ENTMCNC: 32.3 GM/DL — SIGNIFICANT CHANGE UP (ref 32–36)
MCV RBC AUTO: 98.2 FL — SIGNIFICANT CHANGE UP (ref 80–100)
OSMOLALITY UR: 278 MOSM/KG — SIGNIFICANT CHANGE UP (ref 50–1200)
PLATELET # BLD AUTO: 218 K/UL — SIGNIFICANT CHANGE UP (ref 150–400)
POTASSIUM SERPL-MCNC: 3.6 MMOL/L — SIGNIFICANT CHANGE UP (ref 3.5–5.3)
POTASSIUM SERPL-SCNC: 3.6 MMOL/L — SIGNIFICANT CHANGE UP (ref 3.5–5.3)
PROTHROM AB SERPL-ACNC: 18 SEC — HIGH (ref 10.5–13.4)
RBC # BLD: 3.31 M/UL — LOW (ref 4.2–5.8)
RBC # FLD: 15.9 % — HIGH (ref 10.3–14.5)
SODIUM SERPL-SCNC: 140 MMOL/L — SIGNIFICANT CHANGE UP (ref 135–145)
WBC # BLD: 5.85 K/UL — SIGNIFICANT CHANGE UP (ref 3.8–10.5)
WBC # FLD AUTO: 5.85 K/UL — SIGNIFICANT CHANGE UP (ref 3.8–10.5)

## 2022-04-29 PROCEDURE — 99232 SBSQ HOSP IP/OBS MODERATE 35: CPT

## 2022-04-29 RX ADMIN — Medication 325 MILLIGRAM(S): at 20:13

## 2022-04-29 RX ADMIN — TAMSULOSIN HYDROCHLORIDE 0.4 MILLIGRAM(S): 0.4 CAPSULE ORAL at 21:15

## 2022-04-29 RX ADMIN — Medication 2000 UNIT(S): at 09:49

## 2022-04-29 RX ADMIN — Medication 325 MILLIGRAM(S): at 09:49

## 2022-04-29 RX ADMIN — AMIODARONE HYDROCHLORIDE 200 MILLIGRAM(S): 400 TABLET ORAL at 09:49

## 2022-04-29 RX ADMIN — Medication 2: at 12:03

## 2022-04-29 RX ADMIN — Medication 50 MILLIGRAM(S): at 09:49

## 2022-04-29 RX ADMIN — ATORVASTATIN CALCIUM 10 MILLIGRAM(S): 80 TABLET, FILM COATED ORAL at 21:15

## 2022-04-29 RX ADMIN — PREGABALIN 1000 MICROGRAM(S): 225 CAPSULE ORAL at 09:49

## 2022-04-29 RX ADMIN — APIXABAN 2.5 MILLIGRAM(S): 2.5 TABLET, FILM COATED ORAL at 09:49

## 2022-04-29 RX ADMIN — PANTOPRAZOLE SODIUM 40 MILLIGRAM(S): 20 TABLET, DELAYED RELEASE ORAL at 05:36

## 2022-04-29 RX ADMIN — APIXABAN 2.5 MILLIGRAM(S): 2.5 TABLET, FILM COATED ORAL at 21:14

## 2022-04-29 NOTE — CONSULT NOTE ADULT - CONSULT REQUESTED DATE/TIME
History  Chief Complaint   Patient presents with    Urinary Catheter Insertion or Check     evaluate catheter- pt had urolift procedure 8/30 10am, leaking cath     HPI     Patient had a urolift procedure by dr Caitlin Nugent yesterday  Now reporting a leaking catheter  Noted some urine leaking from around the urinary catheter  Noted that his urinary catheter was pulling somewhat  No f/c/s/n/v/d  No cp or sob  No abdominal pain  No dysuria, hematuria  MDM well appearing 76 yom concerned about hays issue, currently draining  Normal appearing hays balloon on bedside US  Patient mostly concerned about his sleeping on the left side and the hays being stuck to the left leg, will change  The patient (and any family present) verbalized understanding of the discharge instructions and warnings that would necessitate return to the Emergency Department  Gave verbal in addition to written discharge instructions  Specifically highlighted areas of special concern regarding the written and verbal discharge instructions and return precautions  All questions were answered prior to discharge  Prior to Admission Medications   Prescriptions Last Dose Informant Patient Reported? Taking?    HYDROcodone-acetaminophen (NORCO) 5-325 mg per tablet   No No   Sig: Take 1 tablet by mouth every 6 (six) hours as needed for pain (For severe pain) for up to 10 days Max Daily Amount: 4 tablets   Multiple Vitamins-Minerals (CENTRUM ULTRA MENS PO)  Self Yes No   Sig: Take 1 tablet by mouth daily   acetaminophen (TYLENOL) 500 mg tablet  Self Yes No   Sig: Take 2 tablets by mouth daily as needed   amLODIPine (NORVASC) 5 mg tablet  Self No No   Sig: Take 1 tablet (5 mg total) by mouth every morning   aspirin (ASPIRIN LOW DOSE) 81 MG tablet  Self Yes No   Sig: Take 1 tablet by mouth daily   dicyclomine (BENTYL) 20 mg tablet  Self No No   Sig: Take 1 tablet (20 mg total) by mouth every 6 (six) hours As needed for abdominal pain docusate sodium (COLACE) 100 mg capsule   No No   Sig: Take 1 capsule (100 mg total) by mouth 2 (two) times a day for 15 days   fenofibrate (TRICOR) 145 mg tablet   No No   Sig: Take 1 tablet (145 mg total) by mouth daily   ibuprofen (MOTRIN) 200 mg tablet   No No   Sig: Take 3 tablets (600 mg total) by mouth every 6 (six) hours as needed for mild pain   lisinopril (ZESTRIL) 40 mg tablet  Self No No   Sig: Take 1 tablet (40 mg total) by mouth daily   loperamide (IMODIUM A-D) 2 MG tablet  Self Yes No   Sig: Take 1 tablet by mouth daily as needed   lovastatin (MEVACOR) 40 MG tablet  Self No No   Sig: Take one tablet by mouth one time daily   metoprolol tartrate (LOPRESSOR) 25 mg tablet  Self No No   Sig: TAKE ONE TABLET BY MOUTH TWICE DAILY    omeprazole (PriLOSEC) 20 mg delayed release capsule   No No   Sig: Take 1 capsule (20 mg total) by mouth daily for 90 days   phenazopyridine (PYRIDIUM) 200 mg tablet   No No   Sig: Take 1 tablet (200 mg total) by mouth 3 (three) times a day as needed (Pain with urination) for up to 3 days      Facility-Administered Medications: None       Past Medical History:   Diagnosis Date    Apnea     Apnea     CPAP (continuous positive airway pressure) dependence     Diverticulitis     Diverticulitis     Hypercholesterolemia     Hyperlipidemia     Hypertension     Mitral valve disorder     Mitral valve disorder     Sleep apnea     Thoracic neuritis     Thoracic neuritis        Past Surgical History:   Procedure Laterality Date    COLONOSCOPY      Complete    CORONARY ANGIOPLASTY      ESOPHAGOGASTRODUODENOSCOPY      Diagnostic    FOOT SURGERY      HERNIA REPAIR      KNEE SURGERY Left     NEUROPLASTY / TRANSPOSITION MEDIAN NERVE AT CARPAL TUNNEL      NJ COLONOSCOPY FLX DX W/COLLJ SPEC WHEN PFRMD N/A 4/10/2018    Procedure: EGD AND COLONOSCOPY;  Surgeon: Rakel Cuello MD;  Location: [facility];   Service: Gastroenterology    NJ CYSTOURETHRO W/IMPLANT N/A 8/30/2018 Procedure: CYSTOSCOPY WITH INSERTION Sukhi Roca;  Surgeon: Fransico Freeman MD;  Location: [facility];  Service: Urology    LA TRANSURETHRAL INCISION OF PROSTATE N/A 8/30/2018    Procedure: TRANSURETHRAL INCISION OF PROSTATE (TUIP); Surgeon: Fransico Freeman MD;  Location: [facility];  Service: Urology    SHOULDER SURGERY      TARSAL TUNNEL RELEASE      Neuroplasty Decompression       Family History   Problem Relation Age of Onset    Diabetes Mother         Mellitus    Heart disease Mother         Arteriosclerotic Cardiovascular    Hypertension Mother     Cancer Father     Arthritis Father         Rheu Mult Site W Involv Of Organs And Systems    Thyroid disease Sister     Diabetes Brother         Diabetes:Mellitus    Heart disease Brother     Hypertension Brother     Coronary artery disease Family      I have reviewed and agree with the history as documented  Social History   Substance Use Topics    Smoking status: Former Smoker     Packs/day: 1 00     Years: 17 00     Quit date: 1978    Smokeless tobacco: Never Used    Alcohol use No        Review of Systems   All other systems reviewed and are negative  Physical Exam  Physical Exam   Constitutional: He is oriented to person, place, and time  He appears well-developed and well-nourished  HENT:   Head: Normocephalic and atraumatic  Eyes: EOM are normal  Pupils are equal, round, and reactive to light  Neck: Normal range of motion  Neck supple  Cardiovascular: Normal rate, regular rhythm and normal heart sounds  No murmur heard  Pulmonary/Chest: Effort normal and breath sounds normal  No respiratory distress  He has no wheezes  Abdominal: Soft  Bowel sounds are normal  He exhibits no distension  There is no tenderness  Genitourinary: Penis normal    Genitourinary Comments: Normal appearing hays, freely mobile   Musculoskeletal: Normal range of motion  He exhibits no edema or tenderness     Neurological: He is alert and oriented to person, place, and time  No cranial nerve deficit  Coordination normal    Skin: Skin is warm and dry  He is not diaphoretic  No erythema  Psychiatric: He has a normal mood and affect  His behavior is normal    Nursing note and vitals reviewed  Vital Signs  ED Triage Vitals [08/31/18 0050]   Temperature Pulse Respirations Blood Pressure SpO2   98 4 °F (36 9 °C) 75 18 135/69 95 %      Temp Source Heart Rate Source Patient Position - Orthostatic VS BP Location FiO2 (%)   Oral Monitor Lying Right arm --      Pain Score       1           Vitals:    08/31/18 0050   BP: 135/69   Pulse: 75   Patient Position - Orthostatic VS: Lying       Visual Acuity      ED Medications  Medications - No data to display    Diagnostic Studies  Results Reviewed     None                 No orders to display              Procedures  Procedures       Phone Contacts  ED Phone Contact    ED Course                               MDM  CritCare Time    Disposition  Final diagnoses: Samuel catheter problem [facility])     Time reflects when diagnosis was documented in both MDM as applicable and the Disposition within this note     Time User Action Codes Description Comment    8/31/2018  2:15 AM [facility], [address] [facility]  9XXA] Samuel catheter problem [facility])       ED Disposition     ED Disposition Condition Comment    Discharge  Subhash Post discharge to home/self care  Condition at discharge: Good        Follow-up Information     Follow up With Specialties Details Why Contact Perry Quiroz DO Internal Medicine In 1 day  [address] [facility]  [phone]            Patient's Medications   Discharge Prescriptions    No medications on file     No discharge procedures on file      ED Provider  Electronically Signed by           Suzy Ryan MD  08/31/18 [id number] 29-Apr-2022 15:13

## 2022-04-29 NOTE — PROGRESS NOTE ADULT - ASSESSMENT
89 yo male with a  pmh/o BPH, CKD4, who presents to ED due to abnormal outpatient labs in setting of abdominal discomfort and weakness, admitted due to:    #LUIS ALFREDO on CKD4  likely secondary to dehydration, obstructive uropathy 2/2 BPH  admit to med surg  nephrology consulted  CT imaging with renal cyst which may be loculated, distended bladder & ureter fullness, may require biopsy of cyst  strict i/o's  gentle hydration at 70cc/hr x 1 day - Creatinine improving      #Anemia  c/w iron supplement  h/h around baseline  c/w AC with close monitoring of h/h    #DMII with hyperglycemia  diet controlled  AISS  Carb restriction  POC qAC/HS    #Asymptomatic pyuria  hold abx, pt afebrile  f/u urine culture    #ETOH use  states no longer drinks daily  no evidence of WD  c/w supplements    #Dilated cardiomyopathy  #HTN/HLD  #Afib s/p PPM  c/w norvasc  c/w pacerone  c/w lipitor  c/w metoprolol  DASH/TLC rest.     #Gout  hold allopurinol due to LUIS ALFREDO  reinstate when fxn improves      #Possible depression  I spoke to patient's MICHELLE White with whom he lives - patient's son and she are going through a divorce and she states that the patient has stated he feels like dying due to the turmoil the divorce is causing in their lives. I advised her that we should call Psychiatry to get their input - she did not think it is necessary as he has never voiced any intention to harm himself or others and he was only upset - but think Psychiatry input will be valuable - at this time she is not opposed to it, will consult     discussed with RN

## 2022-04-29 NOTE — CONSULT NOTE ADULT - SUBJECTIVE AND OBJECTIVE BOX
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 States feeling and eating well and eager to leave. He is unclear of his baseline creatinine. Saw Fabian last, 2 months ago.     PAST MEDICAL & SURGICAL HISTORY:  Arthritis    Spinal stenosis    Gout    Anemia    Afib    HTN (hypertension)    PVD (peripheral vascular disease)    CKD (chronic kidney disease)    DM2 (diabetes mellitus, type 2)    UTI (urinary tract infection)    BPH (benign prostatic hyperplasia)    Pacemaker        MEDICATIONS  (STANDING):  aMIOdarone    Tablet 200 milliGRAM(s) Oral daily  amLODIPine   Tablet 5 milliGRAM(s) Oral daily  apixaban 2.5 milliGRAM(s) Oral two times a day  atorvastatin 10 milliGRAM(s) Oral at bedtime  cholecalciferol 2000 Unit(s) Oral daily  cyanocobalamin 1000 MICROGram(s) Oral daily  dextrose 5%. 1000 milliLiter(s) (100 mL/Hr) IV Continuous <Continuous>  dextrose 5%. 1000 milliLiter(s) (50 mL/Hr) IV Continuous <Continuous>  dextrose 50% Injectable 25 Gram(s) IV Push once  dextrose 50% Injectable 12.5 Gram(s) IV Push once  dextrose 50% Injectable 25 Gram(s) IV Push once  ferrous    sulfate 325 milliGRAM(s) Oral two times a day  glucagon  Injectable 1 milliGRAM(s) IntraMuscular once  insulin lispro (ADMELOG) corrective regimen sliding scale   SubCutaneous three times a day before meals  metoprolol succinate ER 25 milliGRAM(s) Oral at bedtime  pantoprazole    Tablet 40 milliGRAM(s) Oral before breakfast  pyridoxine 50 milliGRAM(s) Oral daily  sodium chloride 0.9%. 1000 milliLiter(s) (70 mL/Hr) IV Continuous <Continuous>  tamsulosin 0.4 milliGRAM(s) Oral at bedtime    MEDICATIONS  (PRN):  acetaminophen     Tablet .. 650 milliGRAM(s) Oral every 6 hours PRN Temp greater or equal to 38C (100.4F), Mild Pain (1 - 3)  dextrose Oral Gel 15 Gram(s) Oral once PRN Blood Glucose LESS THAN 70 milliGRAM(s)/deciliter  polyethylene glycol 3350 17 Gram(s) Oral daily PRN Constipation      Allergies    penicillins (Hives)  shellfish (Other)    Intolerances        SOCIAL HISTORY:  no etoh/cigg    FAMILY HISTORY:  Patient&#x27;s father is     Patient&#x27;s mother is         REVIEW OF SYSTEMS:    CONSTITUTIONAL: stable weakness, no fevers or chills  EYES/ENT: No visual changes;  No vertigo or throat pain   NECK: No pain or stiffness  RESPIRATORY: No cough, wheezing, hemoptysis; No shortness of breath  CARDIOVASCULAR: No chest pain or palpitations  GASTROINTESTINAL: No abdominal or epigastric pain. No nausea, vomiting, or hematemesis; No diarrhea or constipation. No melena or hematochezia.  GENITOURINARY: No dysuria, frequency or hematuria  NEUROLOGICAL: No numbness or weakness  SKIN: No itching, burning, rashes, or lesions   All other review of systems is negative unless indicated above.      T(C): , Max: 36.7 (22 @ 15:25)  T(F): , Max: 98.1 (22 @ 15:25)  HR: 59 (22 @ 12:40)  BP: 98/49 (22 @ 12:40)  BP(mean): 72 (22 @ 22:13)  RR: 18 (22 @ 12:40)  SpO2: 96% (22 @ 12:40)  Wt(kg): --     @ 07:01  -   @ 07:00  --------------------------------------------------------  IN: 0 mL / OUT: 770 mL / NET: -770 mL     @ 07:01  -   @ 15:13  --------------------------------------------------------  IN: 0 mL / OUT: 100 mL / NET: -100 mL      Height (cm): 165.1 ( @ 15:25)  Weight (kg): 70.3 ( @ 22:45)  BMI (kg/m2): 25.8 ( @ 22:45)  BSA (m2): 1.77 ( @ 22:45)    PHYSICAL EXAM:    Constitutional: frail  HEENT: PERRLA, EOMI,  MMM  Neck: No LAD, No JVD  Respiratory: CTAB  Cardiovascular: S1 and S2, RRR  Gastrointestinal: BS+, soft, NT/ND  Extremities: No peripheral edema  Neurological: A/O x 3, no focal deficits  Psychiatric: Normal mood, normal affect  : No Arora  Skin: No rashes  Access: Not applicable        LABS:                        10.5   5.85  )-----------( 218      ( 2022 08:24 )             32.5     2022 08:24    140    |  107    |  58     ----------------------------<  151    3.6     |  27     |  3.38   2022 15:57    137    |  102    |  61     ----------------------------<  205    3.6     |  31     |  3.80     Ca    8.7        2022 08:24  Ca    8.8        2022 15:57    TPro  6.2    /  Alb  3.3    /  TBili  0.7    /  DBili  x      /  AST  24     /  ALT  34     /  AlkPhos  90     2022 15:57          Urine Studies:  Urinalysis Basic - ( 2022 18:55 )    Color: Yellow / Appearance: Clear / S.010 / pH: x  Gluc: x / Ketone: Negative  / Bili: Negative / Urobili: Negative   Blood: x / Protein: Negative / Nitrite: Negative   Leuk Esterase: Trace / RBC: 3-5 /HPF / WBC 11-25   Sq Epi: x / Non Sq Epi: Occasional / Bacteria: Occasional      Osmolality, Random Urine: 278 mosm/Kg ( @ 18:58)        RADIOLOGY & ADDITIONAL STUDIES:

## 2022-04-29 NOTE — CONSULT NOTE ADULT - ASSESSMENT
91 BPH, UTI, DM2 diet controlled, CKD4 [place] , PVD, HTN, afib on Eliquis anemia, gout, spinal stenosis, OA, heart block/bradycardia s/p PPM, dilated cardiomyopathy with EF 70%, previously a daily etoh of wine/beer, who presents to ED due to abnormal outpatient labs.    CKD IV  -Obtain labs from Dr Peralta, suspect near baseline  -Can keep IVF for now, stop if taking optimal PO or by AM  -CT noted, get US for fullness if function does not continue to trend down  -NSAID/contrast avoidance      CM  -Oral meds  -[facility]    thanks, will follow  dc with staff
Detail Level: Detailed
Quality 110: Preventive Care And Screening: Influenza Immunization: Influenza Immunization not Administered for Documented Reasons.

## 2022-04-29 NOTE — PHYSICAL THERAPY INITIAL EVALUATION ADULT - MODALITIES TREATMENT COMMENTS
Pt left as rec'd supine in bed in NAD with IV intact. CBIR. Pt instructed to not get up alone. Bed alarm activated. SHANTANU Prakash present and aware

## 2022-04-29 NOTE — PHYSICAL THERAPY INITIAL EVALUATION ADULT - GAIT DISTANCE, PT EVAL
3 feet via Marching in Place - Ambulation Distance limited at SHANTANU Prakash's request due to asymptomatic Hypotension (SBP in high 70's and low 80's) - Will attempt further ambulation distance next PT Session upon improvement in BP

## 2022-04-29 NOTE — PROGRESS NOTE ADULT - SUBJECTIVE AND OBJECTIVE BOX
CC: Pt is a 89 yo male with a  pmh/o BPH, UTI, DM2 diet controlled, CKD4, PVD, HTN, afib on Eliquis anemia, gout, spinal stenosis, OA, heart block/bradycardia s/p PPM, dilated cardiomyopathy with EF 70%, previously a daily etoh of wine/beer, who presents to ED due to abnormal outpatient labs. Pt states he "feels fine" but has been under increased stress lately and had noticed he has been more tired over past week. Pt also endorsed that he has been feeling abdominal discomfort, midline, not quantifiable on pain scale, non radiating, no a/a factors, intermittent. Pt also notes that sometimes with urination there is a white color to urine. States he knows something is wrong with his kidneys and endorses that he tries to drink plenty of water but it is difficult because he is on lasix and has to urinate frequently.   Denies chest pain, nausea, vomiting, diarrhea, palpations, HA, dysuria, hematuria, incontinence, cough, fever, shortness of breath, leg swelling, claudication, orthopnea, rash, paresthesias, gait change, changes in vision/hearing/speech.         Vital Signs Last 24 Hrs  T(C): 36.4 (29 Apr 2022 15:55), Max: 36.7 (29 Apr 2022 12:40)  T(F): 97.6 (29 Apr 2022 15:55), Max: 98.1 (29 Apr 2022 12:40)  HR: 90 (29 Apr 2022 15:55) (59 - 90)  BP: 93/52 (29 Apr 2022 15:55) (88/43 - 99/61)  BP(mean): 72 (28 Apr 2022 22:13) (72 - 73)  RR: 18 (29 Apr 2022 15:55) (18 - 18)  SpO2: 95% (29 Apr 2022 15:55) (95% - 100%)  Constitutional: NAD, awake and alert  HEENT: PERR, EOMI  Neck: Soft and supple,  No JVD  Respiratory: Breath sounds are clear bilaterally, No wheezing, rales or rhonchi  Cardiovascular: S1 and S2, regular rate and rhythm, no Murmurs  Gastrointestinal: Bowel Sounds present, soft, nontender, nondistended  Extremities: No peripheral edema  Vascular: 2+ peripheral pulses  Neurological: A/O x 3, no focal deficits  Musculoskeletal: 5/5 strength b/l upper and lower extremities  Skin: No rashes    MEDICATIONS:  MEDICATIONS  (STANDING):  aMIOdarone    Tablet 200 milliGRAM(s) Oral daily  amLODIPine   Tablet 5 milliGRAM(s) Oral daily  apixaban 2.5 milliGRAM(s) Oral two times a day  atorvastatin 10 milliGRAM(s) Oral at bedtime  cholecalciferol 2000 Unit(s) Oral daily  cyanocobalamin 1000 MICROGram(s) Oral daily  ferrous    sulfate 325 milliGRAM(s) Oral two times a day  insulin lispro (ADMELOG) corrective regimen sliding scale   SubCutaneous three times a day before meals  metoprolol succinate ER 25 milliGRAM(s) Oral at bedtime  pantoprazole    Tablet 40 milliGRAM(s) Oral before breakfast  pyridoxine 50 milliGRAM(s) Oral daily  sodium chloride 0.9%. 1000 milliLiter(s) (70 mL/Hr) IV Continuous <Continuous>  tamsulosin 0.4 milliGRAM(s) Oral at bedtime      LABS: All Labs Reviewed:                      10.5   5.85  )-----------( 218      ( 29 Apr 2022 08:24 )             32.5   140  |  107  |  58<H>  ----------------------------<  151<H>  3.6   |  27  |  3.38<H>  Ca    8.7      29 Apr 2022 08:24  TPro  6.2  /  Alb  3.3  /  TBili  0.7  /  DBili  x   /  AST  24  /  ALT  34  /  AlkPhos  90  04-28  PT/INR - ( 29 Apr 2022 08:24 )   PT: 18.0 sec;   INR: 1.54 ratio    PTT - ( 29 Apr 2022 08:24 )  PTT:35.6 sec      RADIOLOGY/EKG:  CTAP   IMPRESSION:  Bilateral renal cortical thinning with atrophy of the lower pole of the   left kidney.  Mild fullness of the ureters, nonspecific.  No bowel obstruction.       CC: Pt is a 89 yo male with a  pmh/o BPH, UTI, DM2 diet controlled, CKD4, PVD, HTN, afib on Eliquis anemia, gout, spinal stenosis, OA, heart block/bradycardia s/p PPM, dilated cardiomyopathy with EF 70%, previously a daily etoh of wine/beer, who presents to ED due to abnormal outpatient labs. Pt states he "feels fine" but has been under increased stress lately and had noticed he has been more tired over past week. Pt also endorsed that he has been feeling abdominal discomfort, midline, not quantifiable on pain scale, non radiating, no a/a factors, intermittent. Pt also notes that sometimes with urination there is a white color to urine. States he knows something is wrong with his kidneys and endorses that he tries to drink plenty of water but it is difficult because he is on lasix and has to urinate frequently.   Denies chest pain, nausea, vomiting, diarrhea, palpations, HA, dysuria, hematuria, incontinence, cough, fever, shortness of breath, leg swelling, claudication, orthopnea, rash, paresthesias, gait change, changes in vision/hearing/speech.     4/29 - no cp palps sob abdo pain tingling or numbness     Vital Signs Last 24 Hrs  T(C): 36.4 (29 Apr 2022 15:55), Max: 36.7 (29 Apr 2022 12:40)  T(F): 97.6 (29 Apr 2022 15:55), Max: 98.1 (29 Apr 2022 12:40)  HR: 90 (29 Apr 2022 15:55) (59 - 90)  BP: 93/52 (29 Apr 2022 15:55) (88/43 - 99/61)  BP(mean): 72 (28 Apr 2022 22:13) (72 - 73)  RR: 18 (29 Apr 2022 15:55) (18 - 18)  SpO2: 95% (29 Apr 2022 15:55) (95% - 100%)  Constitutional: NAD, awake and alert  HEENT: PERR, EOMI  Neck: Soft and supple,  No JVD  Respiratory: Breath sounds are clear bilaterally, No wheezing, rales or rhonchi  Cardiovascular: S1 and S2, regular rate and rhythm, no Murmurs  Gastrointestinal: Bowel Sounds present, soft, nontender, nondistended  Extremities: No peripheral edema  Vascular: 2+ peripheral pulses  Neurological: A/O x 3, no focal deficits  Musculoskeletal: 5/5 strength b/l upper and lower extremities  Skin: No rashes    MEDICATIONS:  MEDICATIONS  (STANDING):  aMIOdarone    Tablet 200 milliGRAM(s) Oral daily  amLODIPine   Tablet 5 milliGRAM(s) Oral daily  apixaban 2.5 milliGRAM(s) Oral two times a day  atorvastatin 10 milliGRAM(s) Oral at bedtime  cholecalciferol 2000 Unit(s) Oral daily  cyanocobalamin 1000 MICROGram(s) Oral daily  ferrous    sulfate 325 milliGRAM(s) Oral two times a day  insulin lispro (ADMELOG) corrective regimen sliding scale   SubCutaneous three times a day before meals  metoprolol succinate ER 25 milliGRAM(s) Oral at bedtime  pantoprazole    Tablet 40 milliGRAM(s) Oral before breakfast  pyridoxine 50 milliGRAM(s) Oral daily  sodium chloride 0.9%. 1000 milliLiter(s) (70 mL/Hr) IV Continuous <Continuous>  tamsulosin 0.4 milliGRAM(s) Oral at bedtime      LABS: All Labs Reviewed:                      10.5   5.85  )-----------( 218      ( 29 Apr 2022 08:24 )             32.5   140  |  107  |  58<H>  ----------------------------<  151<H>  3.6   |  27  |  3.38<H>  Ca    8.7      29 Apr 2022 08:24  TPro  6.2  /  Alb  3.3  /  TBili  0.7  /  DBili  x   /  AST  24  /  ALT  34  /  AlkPhos  90  04-28  PT/INR - ( 29 Apr 2022 08:24 )   PT: 18.0 sec;   INR: 1.54 ratio    PTT - ( 29 Apr 2022 08:24 )  PTT:35.6 sec      RADIOLOGY/EKG:  CTAP   IMPRESSION:  Bilateral renal cortical thinning with atrophy of the lower pole of the   left kidney.  Mild fullness of the ureters, nonspecific.  No bowel obstruction.

## 2022-04-30 LAB
ANION GAP SERPL CALC-SCNC: 7 MMOL/L — SIGNIFICANT CHANGE UP (ref 5–17)
BUN SERPL-MCNC: 54 MG/DL — HIGH (ref 7–23)
CALCIUM SERPL-MCNC: 8.8 MG/DL — SIGNIFICANT CHANGE UP (ref 8.5–10.1)
CHLORIDE SERPL-SCNC: 110 MMOL/L — HIGH (ref 96–108)
CO2 SERPL-SCNC: 26 MMOL/L — SIGNIFICANT CHANGE UP (ref 22–31)
CREAT SERPL-MCNC: 3.02 MG/DL — HIGH (ref 0.5–1.3)
EGFR: 19 ML/MIN/1.73M2 — LOW
GLUCOSE SERPL-MCNC: 135 MG/DL — HIGH (ref 70–99)
POTASSIUM SERPL-MCNC: 4.2 MMOL/L — SIGNIFICANT CHANGE UP (ref 3.5–5.3)
POTASSIUM SERPL-SCNC: 4.2 MMOL/L — SIGNIFICANT CHANGE UP (ref 3.5–5.3)
SODIUM SERPL-SCNC: 143 MMOL/L — SIGNIFICANT CHANGE UP (ref 135–145)

## 2022-04-30 PROCEDURE — 99232 SBSQ HOSP IP/OBS MODERATE 35: CPT

## 2022-04-30 RX ORDER — LANOLIN ALCOHOL/MO/W.PET/CERES
3 CREAM (GRAM) TOPICAL AT BEDTIME
Refills: 0 | Status: DISCONTINUED | OUTPATIENT
Start: 2022-04-30 | End: 2022-05-01

## 2022-04-30 RX ADMIN — Medication 3 MILLIGRAM(S): at 21:29

## 2022-04-30 RX ADMIN — PANTOPRAZOLE SODIUM 40 MILLIGRAM(S): 20 TABLET, DELAYED RELEASE ORAL at 06:19

## 2022-04-30 RX ADMIN — Medication 50 MILLIGRAM(S): at 10:19

## 2022-04-30 RX ADMIN — AMIODARONE HYDROCHLORIDE 200 MILLIGRAM(S): 400 TABLET ORAL at 10:21

## 2022-04-30 RX ADMIN — TAMSULOSIN HYDROCHLORIDE 0.4 MILLIGRAM(S): 0.4 CAPSULE ORAL at 21:28

## 2022-04-30 RX ADMIN — APIXABAN 2.5 MILLIGRAM(S): 2.5 TABLET, FILM COATED ORAL at 10:20

## 2022-04-30 RX ADMIN — Medication 325 MILLIGRAM(S): at 10:20

## 2022-04-30 RX ADMIN — PREGABALIN 1000 MICROGRAM(S): 225 CAPSULE ORAL at 10:20

## 2022-04-30 RX ADMIN — Medication 2000 UNIT(S): at 10:20

## 2022-04-30 RX ADMIN — APIXABAN 2.5 MILLIGRAM(S): 2.5 TABLET, FILM COATED ORAL at 21:28

## 2022-04-30 RX ADMIN — ATORVASTATIN CALCIUM 10 MILLIGRAM(S): 80 TABLET, FILM COATED ORAL at 21:28

## 2022-04-30 RX ADMIN — Medication 325 MILLIGRAM(S): at 18:44

## 2022-04-30 NOTE — PROGRESS NOTE ADULT - ASSESSMENT
89 yo male with a  pmh/o BPH, CKD4, who presents to ED due to abnormal outpatient labs in setting of abdominal discomfort and weakness, admitted due to:    #LUIS ALFREDO on CKD4  likely secondary to dehydration, obstructive uropathy 2/2 BPH  admit to med surg  nephrology consulted  CT imaging with renal cyst which may be loculated, distended bladder & ureter fullness, may require biopsy of cyst  s/p IVFs - Creatinine improving and patient eating and drinking today - will watch off IVFs and check Cr tmr  If stable Cr tmr, can dc home       #Anemia  c/w iron supplement  h/h around baseline  c/w AC with close monitoring of h/h    #DMII with hyperglycemia  diet controlled  AISS  Carb restriction  POC qAC/HS    #Asymptomatic pyuria  hold abx, pt afebrile  f/u urine culture    #ETOH use  states no longer drinks daily  no evidence of WD  c/w supplements    #Dilated cardiomyopathy  #HTN/HLD  #Afib s/p PPM  c/w norvasc  c/w pacerone  c/w lipitor  c/w metoprolol  DASH/TLC rest.     #Gout  hold allopurinol due to LUIS ALFREDO  reinstate when fxn improves    #Possible depression  Patient denies feeling depressed, denies SI and HI     discussed with RN

## 2022-04-30 NOTE — PROGRESS NOTE ADULT - ASSESSMENT
91 BPH, UTI, DM2 diet controlled, CKD4 [place]eralta [place] , PVD, HTN, afib on Eliquis anemia, gout, spinal stenosis, OA, heart block/bradycardia s/p PPM, dilated cardiomyopathy with EF 70%, previously a daily etoh of wine/beer, who presents to ED due to abnormal outpatient labs.    CKD IV  -Obtain labs from Dr Peralta, suspect near baseline  -Can keep IVF off, taking po  -CT noted, get US for fullness if function does not continue to trend down  -NSAID/contrast avoidance  -AM labs pending, ordered as stat      CM  -Oral meds  -[facility]    dc with Dr Dee on 4/29 evening  dc with staff   89 BPH, UTI, DM2 diet controlled, CKD4 followed [place] Peralta [place] , PVD, HTN, afib on Eliquis anemia, gout, spinal stenosis, OA, heart block/bradycardia s/p PPM, dilated cardiomyopathy with EF 70%, previously a daily etoh of wine/beer, who presents to ED due to abnormal outpatient labs.    CKD IV  -Obtain labs from Dr Peralta, suspect near baseline  -Can keep IVF off, taking po  -CT noted, get US for fullness if function does not continue to trend down. or as outpatient to monitor for etiology/resolution  -NSAID/contrast avoidance  -AM labs pending, ordered as stat      CM  -Oral meds  -[facility]    dc with Dr Dee on 4/29 evening  dc with staff

## 2022-04-30 NOTE — PROGRESS NOTE ADULT - SUBJECTIVE AND OBJECTIVE BOX
Patient is a 89y Male who reports no complaints as new.      MEDICATIONS  (STANDING):  aMIOdarone    Tablet 200 milliGRAM(s) Oral daily  amLODIPine   Tablet 5 milliGRAM(s) Oral daily  apixaban 2.5 milliGRAM(s) Oral two times a day  atorvastatin 10 milliGRAM(s) Oral at bedtime  cholecalciferol 2000 Unit(s) Oral daily  cyanocobalamin 1000 MICROGram(s) Oral daily  dextrose 5%. 1000 milliLiter(s) (100 mL/Hr) IV Continuous <Continuous>  dextrose 5%. 1000 milliLiter(s) (50 mL/Hr) IV Continuous <Continuous>  dextrose 50% Injectable 25 Gram(s) IV Push once  dextrose 50% Injectable 12.5 Gram(s) IV Push once  dextrose 50% Injectable 25 Gram(s) IV Push once  ferrous    sulfate 325 milliGRAM(s) Oral two times a day  glucagon  Injectable 1 milliGRAM(s) IntraMuscular once  insulin lispro (ADMELOG) corrective regimen sliding scale   SubCutaneous three times a day before meals  metoprolol succinate ER 25 milliGRAM(s) Oral at bedtime  pantoprazole    Tablet 40 milliGRAM(s) Oral before breakfast  pyridoxine 50 milliGRAM(s) Oral daily  sodium chloride 0.9%. 1000 milliLiter(s) (70 mL/Hr) IV Continuous <Continuous>  tamsulosin 0.4 milliGRAM(s) Oral at bedtime    MEDICATIONS  (PRN):  acetaminophen     Tablet .. 650 milliGRAM(s) Oral every 6 hours PRN Temp greater or equal to 38C (100.4F), Mild Pain (1 - 3)  dextrose Oral Gel 15 Gram(s) Oral once PRN Blood Glucose LESS THAN 70 milliGRAM(s)/deciliter  polyethylene glycol 3350 17 Gram(s) Oral daily PRN Constipation        T(C): , Max: 36.7 (22 @ 12:40)  T(F): , Max: 98.1 (22 @ 12:40)  HR: 100 (22 @ 07:45)  BP: 108/65 (22 @ 07:45)  BP(mean): --  RR: 18 (22 @ 07:45)  SpO2: 100% (22 @ 07:45)  Wt(kg): --     07: @ 07:00  --------------------------------------------------------  IN: 0 mL / OUT: 300 mL / NET: -300 mL     @ 07: @ 10:54  --------------------------------------------------------  IN: 0 mL / OUT: 125 mL / NET: -125 mL    PHYSICAL EXAM:    Constitutional: NAD, frail  HEENT: MMM  dist  Cardiovascular: S1 and S2   Extremities:chr peripheral edema  Neurological: A/O x 3   : No Arora  Skin: No rashes  Access: Not applicable        LABS:                        10.5   5.85  )-----------( 218      ( 2022 08:24 )             32.5     2022 08:24    140    |  107    |  58     ----------------------------<  151    3.6     |  27     |  3.38   2022 15:57    137    |  102    |  61     ----------------------------<  205    3.6     |  31     |  3.80     Ca    8.7        2022 08:24  Ca    8.8        2022 15:57    TPro  6.2    /  Alb  3.3    /  TBili  0.7    /  DBili  x      /  AST  24     /  ALT  34     /  AlkPhos  90     2022 15:57          Urine Studies:  Urinalysis Basic - ( 2022 18:55 )    Color: Yellow / Appearance: Clear / S.010 / pH: x  Gluc: x / Ketone: Negative  / Bili: Negative / Urobili: Negative   Blood: x / Protein: Negative / Nitrite: Negative   Leuk Esterase: Trace / RBC: 3-5 /HPF / WBC 11-25   Sq Epi: x / Non Sq Epi: Occasional / Bacteria: Occasional      Osmolality, Random Urine: 278 mosm/Kg ( @ 18:58)        RADIOLOGY & ADDITIONAL STUDIES:

## 2022-04-30 NOTE — PROGRESS NOTE ADULT - SUBJECTIVE AND OBJECTIVE BOX
CC: Pt is a 89 yo male with a  pmh/o BPH, UTI, DM2 diet controlled, CKD4, PVD, HTN, afib on Eliquis anemia, gout, spinal stenosis, OA, heart block/bradycardia s/p PPM, dilated cardiomyopathy with EF 70%, previously a daily etoh of wine/beer, who presents to ED due to abnormal outpatient labs. Pt states he "feels fine" but has been under increased stress lately and had noticed he has been more tired over past week. Pt also endorsed that he has been feeling abdominal discomfort, midline, not quantifiable on pain scale, non radiating, no a/a factors, intermittent. Pt also notes that sometimes with urination there is a white color to urine. States he knows something is wrong with his kidneys and endorses that he tries to drink plenty of water but it is difficult because he is on lasix and has to urinate frequently.   Denies chest pain, nausea, vomiting, diarrhea, palpations, HA, dysuria, hematuria, incontinence, cough, fever, shortness of breath, leg swelling, claudication, orthopnea, rash, paresthesias, gait change, changes in vision/hearing/speech.     4/29 - no cp palps sob abdo pain tingling or numbness   4/30 - no cp palps sob abdo pain; denies feeling depressed, denies any intention to hurt himself or others. considering going to [place] to see his son     Vital Signs Last 24 Hrs  T(F): 97.4 (30 Apr 2022 16:27), Max: 97.6 (29 Apr 2022 20:57)  HR: 100 (30 Apr 2022 16:27) (90 - 100)  BP: 99/58 (30 Apr 2022 16:27) (99/58 - 108/65)  RR: 18 (30 Apr 2022 16:27) (18 - 18)  SpO2: 100% (30 Apr 2022 16:27) (97% - 100%)  Constitutional: NAD, awake and alert  HEENT: PERR, EOMI  Neck: Soft and supple,  No JVD  Respiratory: Breath sounds are clear bilaterally, No wheezing, rales or rhonchi  Cardiovascular: S1 and S2, regular rate and rhythm, no Murmurs  Gastrointestinal: Bowel Sounds present, soft, nontender, nondistended  Extremities: No peripheral edema  Vascular: 2+ peripheral pulses  Neurological: A/O x 3, no focal deficits  Musculoskeletal: 5/5 strength b/l upper and lower extremities  Skin: No rashes    RADIOLOGY/EKG:  CTAP   IMPRESSION:  Bilateral renal cortical thinning with atrophy of the lower pole of the   left kidney.  Mild fullness of the ureters, nonspecific.  No bowel obstruction.    LABS: All Labs Reviewed:                      10.5   5.85  )-----------( 218      ( 29 Apr 2022 08:24 )             32.5   143  |  110<H>  |  54<H>  ----------------------------<  135<H>  4.2   |  26  |  3.02<H>  Ca    8.8      30 Apr 2022 11:44  PT/INR - ( 29 Apr 2022 08:24 )   PT: 18.0 sec;   INR: 1.54 ratio        MEDS:   acetaminophen     Tablet .. 650 milliGRAM(s) Oral every 6 hours PRN  aMIOdarone    Tablet 200 milliGRAM(s) Oral daily  amLODIPine   Tablet 5 milliGRAM(s) Oral daily  apixaban 2.5 milliGRAM(s) Oral two times a day  atorvastatin 10 milliGRAM(s) Oral at bedtime  cholecalciferol 2000 Unit(s) Oral daily  cyanocobalamin 1000 MICROGram(s) Oral daily  ferrous    sulfate 325 milliGRAM(s) Oral two times a day  insulin lispro (ADMELOG) corrective regimen sliding scale   SubCutaneous three times a day before meals  metoprolol succinate ER 25 milliGRAM(s) Oral at bedtime  pantoprazole    Tablet 40 milliGRAM(s) Oral before breakfast  polyethylene glycol 3350 17 Gram(s) Oral daily PRN  pyridoxine 50 milliGRAM(s) Oral daily  sodium chloride 0.9%. 1000 milliLiter(s) IV Continuous <Continuous>  tamsulosin 0.4 milliGRAM(s) Oral at bedtime

## 2022-05-01 ENCOUNTER — TRANSCRIPTION ENCOUNTER (OUTPATIENT)
Age: 87
End: 2022-05-01

## 2022-05-01 VITALS
SYSTOLIC BLOOD PRESSURE: 99 MMHG | TEMPERATURE: 98 F | DIASTOLIC BLOOD PRESSURE: 53 MMHG | HEART RATE: 97 BPM | OXYGEN SATURATION: 94 % | RESPIRATION RATE: 18 BRPM

## 2022-05-01 DIAGNOSIS — F43.20 ADJUSTMENT DISORDER, UNSPECIFIED: ICD-10-CM

## 2022-05-01 LAB
ANION GAP SERPL CALC-SCNC: 5 MMOL/L — SIGNIFICANT CHANGE UP (ref 5–17)
BUN SERPL-MCNC: 47 MG/DL — HIGH (ref 7–23)
CALCIUM SERPL-MCNC: 9.3 MG/DL — SIGNIFICANT CHANGE UP (ref 8.5–10.1)
CHLORIDE SERPL-SCNC: 110 MMOL/L — HIGH (ref 96–108)
CO2 SERPL-SCNC: 26 MMOL/L — SIGNIFICANT CHANGE UP (ref 22–31)
CREAT SERPL-MCNC: 3 MG/DL — HIGH (ref 0.5–1.3)
EGFR: 19 ML/MIN/1.73M2 — LOW
GLUCOSE SERPL-MCNC: 137 MG/DL — HIGH (ref 70–99)
POTASSIUM SERPL-MCNC: 4.2 MMOL/L — SIGNIFICANT CHANGE UP (ref 3.5–5.3)
POTASSIUM SERPL-SCNC: 4.2 MMOL/L — SIGNIFICANT CHANGE UP (ref 3.5–5.3)
SODIUM SERPL-SCNC: 141 MMOL/L — SIGNIFICANT CHANGE UP (ref 135–145)

## 2022-05-01 PROCEDURE — 99239 HOSP IP/OBS DSCHRG MGMT >30: CPT

## 2022-05-01 PROCEDURE — 90792 PSYCH DIAG EVAL W/MED SRVCS: CPT

## 2022-05-01 RX ORDER — POLYETHYLENE GLYCOL 3350 17 G/17G
1 POWDER, FOR SOLUTION ORAL
Qty: 0 | Refills: 0 | DISCHARGE

## 2022-05-01 RX ORDER — ALLOPURINOL 300 MG
0.5 TABLET ORAL
Qty: 0 | Refills: 0 | DISCHARGE

## 2022-05-01 RX ORDER — FUROSEMIDE 40 MG
0.5 TABLET ORAL
Qty: 0 | Refills: 0 | DISCHARGE

## 2022-05-01 RX ADMIN — Medication 50 MILLIGRAM(S): at 09:40

## 2022-05-01 RX ADMIN — PREGABALIN 1000 MICROGRAM(S): 225 CAPSULE ORAL at 09:39

## 2022-05-01 RX ADMIN — Medication 2000 UNIT(S): at 09:39

## 2022-05-01 RX ADMIN — Medication 325 MILLIGRAM(S): at 09:40

## 2022-05-01 RX ADMIN — APIXABAN 2.5 MILLIGRAM(S): 2.5 TABLET, FILM COATED ORAL at 09:40

## 2022-05-01 RX ADMIN — AMIODARONE HYDROCHLORIDE 200 MILLIGRAM(S): 400 TABLET ORAL at 09:40

## 2022-05-01 RX ADMIN — PANTOPRAZOLE SODIUM 40 MILLIGRAM(S): 20 TABLET, DELAYED RELEASE ORAL at 06:29

## 2022-05-01 NOTE — BEHAVIORAL HEALTH ASSESSMENT NOTE - NSBHCHARTREVIEWINVESTIGATE_PSY_A_CORE FT
Ventricular Rate 88 BPM    Atrial Rate 83 BPM    QRS Duration 190 ms    Q-T Interval 472 ms    QTC Calculation(Bazett) 571 ms    P Axis 95 degrees    R Axis 57 degrees    T Axis -75 degrees    Diagnosis Line Ventricular-paced rhythm  Abnormal ECG  When compared with ECG of 07-FEB-2020 06:36,  Vent. rate has increased BY  28 BPM

## 2022-05-01 NOTE — PROGRESS NOTE ADULT - ASSESSMENT
89 BPH, UTI, DM2 diet controlled, CKD4 [place] Peralta [place] , PVD, HTN, afib on Eliquis anemia, gout, spinal stenosis, OA, heart block/bradycardia s/p PPM, dilated cardiomyopathy with EF 70%, previously a daily etoh of wine/beer, who presents to ED due to abnormal outpatient labs.    CKD IV  -Keep even, renal stable   -Will need outpatient follow up with Dr Peralta  -CT noted, will need outpatient follow up with US for fullness for etiology/resolution/workup  -NSAID/contrast avoidance    CM  -Oral meds  -[facility]    dc with staff

## 2022-05-01 NOTE — DISCHARGE NOTE PROVIDER - CARE PROVIDER_API CALL
Gus Peralta)  Internal Medicine; Nephrology  [address], [place] [id number]  Phone: [phone]  Fax: [phone]  Follow Up Time: 1-3 days    [place] [address]  Phone: [phone]  Fax: [phone]  Follow Up Time: 1-3 days

## 2022-05-01 NOTE — DISCHARGE NOTE NURSING/CASE MANAGEMENT/SOCIAL WORK - PATIENT PORTAL LINK FT
You can access the FollowMyHealth Patient Portal offered by [facility] by registering at the following website: [email or web address]. By joining [employer]’s FollowMyHealth portal, you will also be able to view your health information using other applications (apps) compatible with our system.

## 2022-05-01 NOTE — DISCHARGE NOTE PROVIDER - CARE PROVIDERS DIRECT ADDRESSES
,DirectAddress_Unknown,[email or web address]

## 2022-05-01 NOTE — PROGRESS NOTE ADULT - SUBJECTIVE AND OBJECTIVE BOX
Patient is a 89y Male who reports no complaints as new, asleep but arousable.        MEDICATIONS  (STANDING):  aMIOdarone    Tablet 200 milliGRAM(s) Oral daily  amLODIPine   Tablet 5 milliGRAM(s) Oral daily  apixaban 2.5 milliGRAM(s) Oral two times a day  atorvastatin 10 milliGRAM(s) Oral at bedtime  cholecalciferol 2000 Unit(s) Oral daily  cyanocobalamin 1000 MICROGram(s) Oral daily  dextrose 5%. 1000 milliLiter(s) (100 mL/Hr) IV Continuous <Continuous>  dextrose 5%. 1000 milliLiter(s) (50 mL/Hr) IV Continuous <Continuous>  dextrose 50% Injectable 25 Gram(s) IV Push once  dextrose 50% Injectable 12.5 Gram(s) IV Push once  dextrose 50% Injectable 25 Gram(s) IV Push once  ferrous    sulfate 325 milliGRAM(s) Oral two times a day  glucagon  Injectable 1 milliGRAM(s) IntraMuscular once  insulin lispro (ADMELOG) corrective regimen sliding scale   SubCutaneous three times a day before meals  melatonin 3 milliGRAM(s) Oral at bedtime  metoprolol succinate ER 25 milliGRAM(s) Oral at bedtime  pantoprazole    Tablet 40 milliGRAM(s) Oral before breakfast  pyridoxine 50 milliGRAM(s) Oral daily  sodium chloride 0.9%. 1000 milliLiter(s) (70 mL/Hr) IV Continuous <Continuous>  tamsulosin 0.4 milliGRAM(s) Oral at bedtime    MEDICATIONS  (PRN):  acetaminophen     Tablet .. 650 milliGRAM(s) Oral every 6 hours PRN Temp greater or equal to 38C (100.4F), Mild Pain (1 - 3)  dextrose Oral Gel 15 Gram(s) Oral once PRN Blood Glucose LESS THAN 70 milliGRAM(s)/deciliter  polyethylene glycol 3350 17 Gram(s) Oral daily PRN Constipation        T(C): , Max: 36.7 (04-30-22 @ 21:25)  T(F): , Max: 98 (04-30-22 @ 21:25)  HR: 97 (05-01-22 @ 07:57)  BP: 99/53 (05-01-22 @ 07:57)  BP(mean): 70 (04-30-22 @ 21:25)  RR: 18 (05-01-22 @ 07:57)  SpO2: 94% (05-01-22 @ 07:57)  Wt(kg): --    04-30 @ 07:01  -  05-01 @ 07:00  --------------------------------------------------------  IN: 1300 mL / OUT: 1450 mL / NET: -150 mL          PHYSICAL EXAM:    Constitutional: NAD, frail  HEENT:  MM  Respiratory: dist  Cardiovascular: S1 and S2   Extremities: chronic peripheral edema  Neurological: Asleep but arousable           LABS:    01 May 2022 07:17    141    |  110    |  47     ----------------------------<  137    4.2     |  26     |  3.00   30 Apr 2022 11:44    143    |  110    |  54     ----------------------------<  135    4.2     |  26     |  3.02   29 Apr 2022 08:24    140    |  107    |  58     ----------------------------<  151    3.6     |  27     |  3.38   28 Apr 2022 15:57    137    |  102    |  61     ----------------------------<  205    3.6     |  31     |  3.80     Ca    9.3        01 May 2022 07:17  Ca    8.8        30 Apr 2022 11:44  Ca    8.7        29 Apr 2022 08:24  Ca    8.8        28 Apr 2022 15:57    TPro  6.2    /  Alb  3.3    /  TBili  0.7    /  DBili  x      /  AST  24     /  ALT  34     /  AlkPhos  90     28 Apr 2022 15:57          Urine Studies:          RADIOLOGY & ADDITIONAL STUDIES:

## 2022-05-01 NOTE — DISCHARGE NOTE PROVIDER - NSDCCPCAREPLAN_GEN_ALL_CORE_FT
PRINCIPAL DISCHARGE DIAGNOSIS  Diagnosis: Acute kidney injury (nontraumatic)  Assessment and Plan of Treatment: Your creatinine is better now; see Dr Peralta within a week and check your bloodwork - Cr.  If your creatinine is stable, talk to Dr Peralta about resuming allopurinol and lasix.   If you feel ill - have chest pain, palpitations, shortness of breath or loss of appetite, leg swelling, call your doctor or come to the ED

## 2022-05-01 NOTE — DISCHARGE NOTE PROVIDER - NSDCFUSCHEDAPPT_GEN_ALL_CORE_FT
Cami Suggs  [facility]  [facility] [address]  Scheduled Appointment: 05/23/2022    Laurence Marquez  [facility]  Cardio [address]  Scheduled Appointment: 06/15/2022    [facility]  CardioElectro [address]  Scheduled Appointment: 06/28/2022    Gus Peralta  [facility]  Nephro [address]  Scheduled Appointment: 07/13/2022

## 2022-05-01 NOTE — BEHAVIORAL HEALTH ASSESSMENT NOTE - NSBHCHARTREVIEWVS_PSY_A_CORE FT
Vital Signs Last 24 Hrs  T(C): 36.4 (01 May 2022 07:57), Max: 36.7 (30 Apr 2022 21:25)  T(F): 97.5 (01 May 2022 07:57), Max: 98 (30 Apr 2022 21:25)  HR: 97 (01 May 2022 07:57) (97 - 100)  BP: 99/53 (01 May 2022 07:57) (99/53 - 104/61)  BP(mean): 70 (30 Apr 2022 21:25) (70 - 70)  RR: 18 (01 May 2022 07:57) (16 - 18)  SpO2: 94% (01 May 2022 07:57) (94% - 100%)

## 2022-05-01 NOTE — BEHAVIORAL HEALTH ASSESSMENT NOTE - RISK ASSESSMENT
Low Acute Suicide Risk Low suicide risk:  Acute risk factors: acute medical problems   Long term: aging white man   Protective factors: supportive family

## 2022-05-01 NOTE — BEHAVIORAL HEALTH ASSESSMENT NOTE - SUBSTANCE USE
[facility] GERIATRIC SERVICES       Patient Angela Beckham  MRN: [id number]        Reason for Visit     Chief Complaint   Patient presents with     Hospital F/U       Code Status     CPR/Full code     Assessment     Acute ischemic rt MCA CVA  Acute encephalopathy/delirium secondary to CVA  New onset atrial fibrillation with rapid ventricular response  C-spine ligamentous injury  Persistent leukocytosis  Chronic lower extremity edema  Diabetes type 2  Hypertension  Generalized weakness    Plan     Pt is admitted to TCU for strengthening and rehab.  Patient admitted after a welfare check was done on her and she was found on the floor.  She was brought in with acute delirium to the hospital subsequently work-up revealed the delirium was secondary to CVA.  Imaging had shown acute ischemic CVA.  Neurology was consulted.  They suspect this is embolic.  She is on atorvastatin 20 mg daily.  Aspirin has been discontinued.  Speech-language pathology recommended a dysphagia diet.  She is on a modified diet  She is on anticoagulation with Eliquis.  Noted to have elevated heart rates with atrial fibrillation which is new in onset.  Currently discharged on a higher dose of metoprolol.  Continue Eliquis.  Noted to have a C-spine ligamentous injury on imaging.  Neurosurgery consulted.  Given an Rueter collar.  She will need an outpatient cervical MRI to further work-up for ligamentous injury.  Also noted to have chronic lymphedema bilateral lower extremity with worsening she was given a higher dose of diuretics.  Weights to be monitored.  Recheck labs for leukocytosis.  Has pressure injury of her back.  Also noted to have a blister on her BKA stump which will require wound care  Diabetes treatment optimized she remains on Metformin and glimepiride as well as Lantus.  Continue to monitor  So far all blood sugars are under 200  Patient is also exhibiting signs of some cognitive impairment.  [facility] 12/15.  Will await for a CPT.  Recheck  labs  Continue with PT/OT-at present is quite weak and requires a two-person assist with ADLs and a Kendy for transfers  She is also reportedly incontinent for bowel and bladder.  She continues to have several psychosocial issues and remains unrealistic in her expectations.  She told me she like to go home in the evening.  When I asked her how she had no answer she thought somebody would transfer her home.  She has been living independently as per her and believes she can still manage her cares though she just had an episode of bowel incontinence in her bed.  On questioning she believes she could have clean herself up.  In addition she stated that she has been driving up till now.  She also has been managing her cares independently with minimal psychosocial support other than some friends she does not have any children.  Will await cognitive testing results as well    will have  involved suspect she may no longer be a candidate for living independently.    Patient however is determined that she wants to go home  She denies any weakness from her CVA though at present she cannot walk.  Continue with her PT and OT.  Total time spent is 45 minutes with 28 minutes spent face-to-face talking this patient reviewing her care concerns including discharge planning and her ability to live independently especially with below the knee amputation.  Patient reports that she has been doing well so far and managing her cares including driving all by herself prior to hospitalization.  She has no recall of events leading to her being found on the floor    History     Patient is a very pleasant 73 year old female who is admitted to TCU  Patient was admitted after she was found on the floor.  She was brought to the emergency room  Imaging did reveal that she had acute ischemic CVA.  She also had acute encephalopathy with fluctuating level of consciousness.  Neurology was consulted.  She also was found a new onset atrial  fibrillation with rapid ventricular response.  She has been started on metoprolol.  Noted to have a C-spine ligamentous injury on CT imaging.  Neurosurgery consulted and she has been given an Lostine collar.  She had significant edema of her bilateral lower extremity with worsening diuretics were resumed at discharge  Also noted to have a pressure injury and discharged to the TCU for strengthening and rehab    Past Medical & Surgical History     PAST MEDICAL HISTORY:   Past Medical History:   Diagnosis Date     Diabetic neuropathy (H)      HTN      Type 2 diabetes mellitus (H)       PAST SURGICAL HISTORY:  natividad henderson      Past Social History     Reviewed,  has an unknown smoking status. She has never used smokeless tobacco. She reports that she does not drink alcohol and does not use drugs.    Family History     Reviewed, and family history includes Alzheimer Disease in her father; Cerebrovascular Disease in her maternal grandmother and mother; Diabetes in her paternal grandfather; Hypertension in her brother and mother; Obesity in her brother; Respiratory in her father.    Medication List   Post Discharge Medication Reconciliation Status: Post Discharge Medication Reconciliation Status: discharge medications reconciled, continue medications without change.  Current Outpatient Medications   Medication     acetaminophen (TYLENOL) 325 MG tablet     apixaban ANTICOAGULANT (ELIQUIS) 5 MG tablet     atorvastatin (LIPITOR) 20 MG tablet     cyanocobalamin 1000 MCG SUBL     dimethicone 1 % external cream     furosemide (LASIX) 20 MG tablet     glimepiride (AMARYL) 1 MG tablet     melatonin 3 MG tablet     METFORMIN  MG OR TABS     metoprolol tartrate (LOPRESSOR) 100 MG tablet     vitamin B-Complex     vitamin D3 (CHOLECALCIFEROL) 250 mcg (71130 units) capsule     GLIMEPIRIDE 4 MG OR TABS     LISINOPRIL 20 MG OR TABS     SB LOW DOSE ASA EC 81 MG OR TBEC     SIMVASTATIN 20 MG OR TABS     No current facility-administered  "medications for this visit.       Allergies     No Known Allergies    Review of Systems   A comprehensive review of 14 systems was done. Pertinent findings noted here and in history of present illness. All the rest negative.  Constitutional: Negative.  Negative for fever, chills, she has  activity change, appetite change and fatigue.   HENT: Negative for congestion and facial swelling.    Eyes: Negative for photophobia, redness and visual disturbance.   Respiratory: Negative for cough and chest tightness.    Cardiovascular: Negative for chest pain, palpitations and leg swelling.   Gastrointestinal: Negative for nausea, diarrhea, constipation, blood in stool and abdominal distention.   Genitourinary: Negative.  Reporting incontinence  Musculoskeletal: Currently very weak and bedbound she cannot walk currently  Skin: Negative.    Neurological: Negative for dizziness, tremors, syncope, weakness, light-headedness and headaches.   Denies any weakness from cva even though she is bed bound  Hematological: Does not bruise/bleed easily.   Psychiatric/Behavioral: Negative.  Affect is flat  Patient is unrealistic in her expectations thinking that she could discharge tonight to her own home and come back tomorrow  Continues to insist she can manage her own cares      Physical Exam   /75   Pulse 99   Temp 97.8  F (36.6  C)   Resp 18   Ht 1.588 m (5' 2.5\")   Wt 94.3 kg (208 lb)   SpO2 96%   BMI 37.44 kg/m       Constitutional: Oriented to person, place, and time and appears well-developed.   HEENT:  Normocephalic and atraumatic.  Eyes: Conjunctivae and EOM are normal. Pupils are equal, round, and reactive to light. No discharge.  No scleral icterus. Nose normal. Mouth/Throat: Oropharynx is clear and moist. No oropharyngeal exudate.    NECK: Normal range of motion. Neck supple. No JVD present. No tracheal deviation present. No thyromegaly present.   CARDIOVASCULAR: Normal rate, regular rhythm and intact distal pulses. "  Exam reveals no gallop and no friction rub.  Systolic murmur present.  PULMONARY: Effort normal and breath sounds normal. No respiratory distress.No Wheezing or rales.  ABDOMEN: Soft. Bowel sounds are normal. No distension and no mass.  There is no tenderness. There is no rebound and no guarding. No HSM.  Stage 2 pressure injury on left low back  MUSCULOSKELETAL: Normal range of motion. No edema and no tenderness. Mild kyphosis, no tenderness.  Has a left BKA  LYMPH NODES: Has no cervical, supraclavicular, axillary and groin adenopathy.   NEUROLOGICAL: Alert and oriented to person, place, and time. No cranial nerve deficit.  Normal muscle tone. Coordination normal.   GENITOURINARY: Deferred exam.  SKIN: Skin is warm and dry. No rash noted. No erythema. No pallor.   Skin blister on her left BKA is healing with no concern for infection  EXTREMITIES: No cyanosis, no clubbing, no edema. No Deformity.  PSYCHIATRIC: abNormal mood, affect and behavior.  Does not have much recall of recent events.  Expectations are also somewhat unrealistic      Lab Results       Creat 0.9    Electronically signed by    Ginny Johnson MD                        None known

## 2022-05-01 NOTE — BEHAVIORAL HEALTH ASSESSMENT NOTE - DESCRIPTION
BPH, UTI, DM2 diet controlled, CKD4 [place]eral[place] , PVD, HTN, afib on Eliquis anemia, gout, spinal stenosis, OA, heart block/bradycardia s/p PPM, dilated cardiomyopathy with EF 70%,

## 2022-05-01 NOTE — DISCHARGE NOTE NURSING/CASE MANAGEMENT/SOCIAL WORK - NSDCPEFALRISK_GEN_ALL_CORE
For information on Fall & Injury Prevention, visit: [email or web address] OR  [email or web address] OR  https://www.cdc.gov/steadi/patient.html

## 2022-05-01 NOTE — BEHAVIORAL HEALTH ASSESSMENT NOTE - THOUGHT ASSOCIATIONS
Case Cam (:  1965) is a 64 y.o. female,New patient, here for evaluation of the following chief complaint(s):  Establish Care        SUBJECTIVE/OBJECTIVE:  HPI    64 y.o. female here to establish care    No major medical issues except for mild elevated lipids  Not on any meds. Remote hx of smoker , quit in . No depression issues   and lives with      Employed as a nurse     Mother with hx of afibb and pacer  Brother with hx of pancreatic cancer  Sister with renal cancer     Past Medical History:   Diagnosis Date    Vision impairment     wears glasses and contacts     Past Surgical History:   Procedure Laterality Date    COLONOSCOPY N/A 3/10/2020    COLONOSCOPY DIAGNOSTIC performed by Ketty Beach MD at [address] [place] [address] Right     KNEE ARTHROSCOPY Left     partial medial menisectomy    KNEE ARTHROSCOPY Left      No Known Allergies    Current Outpatient Medications   Medication Sig Dispense Refill    acetaminophen (TYLENOL) 500 MG tablet Take 500 mg by mouth every 6 hours as needed for Pain      lubiprostone (AMITIZA) 24 MCG capsule Take 1 capsule by mouth 2 times daily (with meals) for 5 days Samples given [id number] exp  12 capsule 0    Cholecalciferol (VITAMIN D3) 125 MCG (5000 UT) TABS Take 1 tablet by mouth daily       No current facility-administered medications for this visit.       Social History     Socioeconomic History    Marital status:      Spouse name: Not on file    Number of children: Not on file    Years of education: Not on file    Highest education level: Not on file   Occupational History    Not on file   Social Needs    Financial resource strain: Not on file    Food insecurity     Worry: Not on file     Inability: Not on file    Transportation needs     Medical: Not on file     Non-medical: Not on file   Tobacco Use    Smoking status: Former Smoker     Quit date: 2003     Years since quittin.5  Smokeless tobacco: Never Used   Substance and Sexual Activity    Alcohol use: No    Drug use: No    Sexual activity: Not on file   Lifestyle    Physical activity     Days per week: Not on file     Minutes per session: Not on file    Stress: Not on file   Relationships    Social connections     Talks on phone: Not on file     Gets together: Not on file     Attends Synagogue service: Not on file     Active member of club or organization: Not on file     Attends meetings of clubs or organizations: Not on file     Relationship status: Not on file    Intimate partner violence     Fear of current or ex partner: Not on file     Emotionally abused: Not on file     Physically abused: Not on file     Forced sexual activity: Not on file   Other Topics Concern    Not on file   Social History Narrative    2018 ,living with ;children grown;nurse at [employer];      Family History   Problem Relation Age of Onset    Heart Disease Mother          26   Scheryl Gemma Arthritis Father          36    Prostate Cancer Brother          age 54;   Scheryl Gemma Kidney Cancer Sister         renal cancer at age 39;   Scheryl Gemma Parkinsonism Maternal Grandfather     No Known Problems Daughter     No Known Problems Daughter     No Known Problems Son        Review of Systems    Constitutional: Negative for fever or chills  HENT: Negative for sore throat   Eyes: Negative for redness   Respiratory: Negative  for dyspnea, cough   Cardiovascular: Negative for chest pain   Gastrointestinal:neg for abd pain, nausea or vomiting or diarrhea  No melena or BRPR  Genitourinary: Negative for hematuria   Musculoskeletal: Negative for arthralgias   Skin: Negative for rash   Neurological: Negative for syncope   Hematological: Negative for adenopathy   Psychiatric/Behavorial: Negative for anxiety      Physical Exam    Vitals:    21 1133   BP: 120/70   Pulse: 70   Resp: 18   Temp: 98.4 °F (36.9 °C)         General: healthy appearing middle aged female ,   Awake, alert and oriented. Appears to be not in any distress  Mucous Membranes:  Pink , anicteric  HEENT - MM clear. TM normal.   No Submandibular LN palpable  Neck: No JVD, no carotid bruit, no thyromegaly  Chest:  Clear to auscultation bilaterally, no added sounds  Cardiovascular:  RRR S1S2 heard, no murmurs or gallops  Abdomen:  Soft, undistended, non tender, no organomegaly, BS present  Extremities: No edema or cyanosis. Distal pulses well felt  Neurological : grossly normal  Non focal     Wt Readings from Last 3 Encounters:   03/11/21 138 lb (62.6 kg)   03/10/20 129 lb 9.6 oz (58.8 kg)   03/09/20 132 lb (59.9 kg)          Diagnosis Orders   1. Annual physical exam  COMPREHENSIVE METABOLIC PANEL    Lipid, Fasting    CBC WITH AUTO DIFFERENTIAL    TSH with Reflex    URIC ACID    URINALYSIS   2. Mixed hyperlipidemia         Mild hyperlipidemia - pt working on diet  Can try red yeast rice  If willing  Pt does not want any statins    Annual blood work ordered    Refuses covid vaccine, had covid infection in 1/21  Recommend shingrix  Had mammo regularly   Had colonoscopy       fw yearly    An electronic signature was used to authenticate this note.     --Brandon Santos MD Normal

## 2022-05-01 NOTE — BEHAVIORAL HEALTH ASSESSMENT NOTE - NSBHCHARTREVIEWLAB_PSY_A_CORE FT
05-01    141  |  110<H>  |  47<H>  ----------------------------<  137<H>  4.2   |  26  |  3.00<H>    Ca    9.3      01 May 2022 07:17

## 2022-05-01 NOTE — BEHAVIORAL HEALTH ASSESSMENT NOTE - SUMMARY
PT is a 89 YOWM with no past psych hx, who, reportedly,  expressed unhappiness about son getting .   PT denies feeling depressed, he is  bright spontaneous,  smiling,. denies anxiety denies sleep and appetite problems (other then noise in hospital), denies SI, HI, AH, VH, PI. NO psych hx,. remote hs of EtOH abuse, no trauma, no legal, hx no guns,

## 2022-05-01 NOTE — DISCHARGE NOTE PROVIDER - HOSPITAL COURSE
CC: Pt is a 89 yo male with a  pmh/o BPH, UTI, DM2 diet controlled, CKD4, PVD, HTN, afib on Eliquis anemia, gout, spinal stenosis, OA, heart block/bradycardia s/p PPM, dilated cardiomyopathy with EF 70%, previously a daily etoh of wine/beer, who presents to ED due to abnormal outpatient labs. Pt states he "feels fine" but has been under increased stress lately and had noticed he has been more tired over past week. Pt also endorsed that he has been feeling abdominal discomfort, midline, not quantifiable on pain scale, non radiating, no a/a factors, intermittent. Pt also notes that sometimes with urination there is a white color to urine. States he knows something is wrong with his kidneys and endorses that he tries to drink plenty of water but it is difficult because he is on lasix and has to urinate frequently.     #LUIS ALFREDO on CKD4  likely secondary to dehydration, obstructive uropathy 2/2 BPH  admit to med surg  CT imaging with renal cyst which may be loculated, distended bladder & ureter fullness, may require biopsy of cyst  s/p IVFs - Creatinine improving and patient eating and drinking today - watched off IVFs and Cr stable, recheck bloodwork at PCP/Nephrology office     #Anemia of Chronic Kidney Disease, no active blood loss   c/w iron supplement  c/w AC with close monitoring of h/h    #DMII with hyperglycemia  Carb restriction  POC qAC/HS, RISS     #Asymptomatic pyuria  hold abx, pt afebrile  f/u urine culture - NTD     #ETOH use  states no longer drinks daily  no evidence of WD  c/w supplements    #Dilated cardiomyopathy  #HTN/HLD  #Afib s/p PPM  c/w norvasc  c/w pacerone  c/w lipitor  c/w metoprolol  DASH/TLC rest.     #Gout  hold allopurinol due to LUIS ALFREDO  reinstate when fxn improves    #Possible depression  Patient denies feeling depressed, denies SI and HI CC: Pt is a 89 yo male with a  pmh/o BPH, UTI, DM2 diet controlled, CKD4, PVD, HTN, afib on Eliquis anemia, gout, spinal stenosis, OA, heart block/bradycardia s/p PPM, dilated cardiomyopathy with EF 70%, previously a daily etoh of wine/beer, who presents to ED due to abnormal outpatient labs. Pt states he "feels fine" but has been under increased stress lately and had noticed he has been more tired over past week. Pt also endorsed that he has been feeling abdominal discomfort, midline, not quantifiable on pain scale, non radiating, no a/a factors, intermittent. Pt also notes that sometimes with urination there is a white color to urine. States he knows something is wrong with his kidneys and endorses that he tries to drink plenty of water but it is difficult because he is on lasix and has to urinate frequently.     HOSPITAL COURSE  #LUIS ALFREDO on CKD4  likely secondary to dehydration, obstructive uropathy 2/2 BPH  CT imaging with renal cyst which may be loculated, distended bladder & ureter fullness, may require biopsy of cyst  s/p IVFs - Creatinine improving and patient eating and drinking today - watched off IVFs and Cr stable, recheck bloodwork at PCP/Nephrology office     #Anemia of Chronic Kidney Disease, no active blood loss   c/w iron supplement  c/w AC with close monitoring of h/h    #DMII with hyperglycemia  Carb restriction  POC qAC/HS, RISS     #Asymptomatic pyuria  hold abx, pt afebrile  f/u urine culture - NTD     #ETOH use  states no longer drinks daily  no evidence of WD  c/w supplements    #Dilated cardiomyopathy  #HTN/HLD  #Afib s/p PPM  c/w norvasc,  pacerone and BB   c/w lipitor    #Gout  hold allopurinol due to LUIS ALFREDO  reinstate when fxn improves    #Possible depression  Patient denies feeling depressed, denies SI and HI     OTHER DETAILS:   5/1 - no cp palps sob, in good spirits, again denies feeling sad or depressed and is eager to go home     PHYSICAL EXAM:  Vital Signs Last 24 Hrs  T(F): 97.5 (01 May 2022 07:57), Max: 98 (30 Apr 2022 21:25)  HR: 97 (01 May 2022 07:57) (97 - 100)  BP: 99/53 (01 May 2022 07:57) (99/53 - 104/61)  RR: 18 (01 May 2022 07:57) (16 - 18)  SpO2: 94% (01 May 2022 07:57) (94% - 100%)  GENERAL: NAD, able to lie flat in bed  HEAD:  Atraumatic, Normocephalic  EYES: EOMI, PERRLA, normal sclera  ENT: Moist mucous membranes  NECK: Supple, No JVD, no nuchal rigidity  CHEST/LUNG: Clear to auscultation bilaterally; No rales, rhonchi, wheezing, or rubs. Unlabored respirations  HEART: Regular rate and rhythm; No murmurs, rubs, or gallops  ABDOMEN: Bowel sounds present; Soft, Nontender, Nondistended. No hepatomegaly  EXTREMITIES:  no pitting bilaterally  NERVOUS SYSTEM:  Alert & Oriented X3, speech clear. No focal motor or sensory deficits  MSK: FROM all 4 extremities, full and equal strength  SKIN: No rashes or lesions    LABS: All Labs Reviewed:  141  |  110<H>  |  47<H>  ----------------------------<  137<H>  4.2   |  26  |  3.00<H>  Ca    9.3      01 May 2022 07:17  RADIOLOGY/EKG:  < from: CT Abdomen and Pelvis No Cont (04.28.22 @ 19:25) >  Bilateral renal cortical thinning with atrophy of the lower pole of the   left kidney.  Mild fullness of the ureters, nonspecific.  No bowel obstruction.    time spent on discharge- 55 mins

## 2022-05-01 NOTE — DISCHARGE NOTE PROVIDER - PROVIDER TOKENS
PROVIDER:[TOKEN:[[id number]],FOLLOWUP:[1-3 days]],PROVIDER:[TOKEN:[[id number]],FOLLOWUP:[1-3 days]]

## 2022-05-01 NOTE — DISCHARGE NOTE PROVIDER - NSDCMRMEDTOKEN_GEN_ALL_CORE_FT
allopurinol 300 mg oral tablet: 0.5 tab(s) orally once a day  amiodarone 200 mg oral tablet: 1 tab(s) orally once a day (in the afternoon)  amLODIPine 5 mg oral tablet: 1 tab(s) orally once a day  apixaban 2.5 mg oral tablet: 1 tab(s) orally 2 times a day  atorvastatin 10 mg oral tablet: 1 tab(s) orally once a day (in the evening)  ClearLax oral powder for reconstitution: 1  orally , As Needed  cyanocobalamin 1000 mcg oral tablet: 1 tab(s) orally once a day  ferrous sulfate 325 mg (65 mg elemental iron) oral tablet: 2 tab(s) orally once a day  furosemide 20 mg oral tablet: 0.5 tab(s) orally 2 times a day  Metoprolol Succinate ER 25 mg oral tablet, extended release: 1 tab(s) orally once a day (in the evening)  pantoprazole 40 mg oral delayed release tablet: 1 tab(s) orally once a day after dinner  tamsulosin 0.4 mg oral capsule: 1 cap(s) orally once a day  Vitamin B6 50 mg oral tablet: 1 tab(s) orally once a day  Vitamin D3 50 mcg (2000 intl units) oral tablet: 1 tab(s) orally once a day   amiodarone 200 mg oral tablet: 1 tab(s) orally once a day (in the afternoon)  amLODIPine 5 mg oral tablet: 1 tab(s) orally once a day  apixaban 2.5 mg oral tablet: 1 tab(s) orally 2 times a day  atorvastatin 10 mg oral tablet: 1 tab(s) orally once a day (in the evening)  ClearLax oral powder for reconstitution: 1  orally once a day, As Needed  cyanocobalamin 1000 mcg oral tablet: 1 tab(s) orally once a day  ferrous sulfate 325 mg (65 mg elemental iron) oral tablet: 2 tab(s) orally once a day  Metoprolol Succinate ER 25 mg oral tablet, extended release: 1 tab(s) orally once a day (in the evening)  pantoprazole 40 mg oral delayed release tablet: 1 tab(s) orally once a day after dinner  tamsulosin 0.4 mg oral capsule: 1 cap(s) orally once a day  Vitamin B6 50 mg oral tablet: 1 tab(s) orally once a day  Vitamin D3 50 mcg (2000 intl units) oral tablet: 1 tab(s) orally once a day

## 2022-05-02 ENCOUNTER — NON-APPOINTMENT (OUTPATIENT)
Age: 87
End: 2022-05-02

## 2022-05-03 ENCOUNTER — APPOINTMENT (OUTPATIENT)
Dept: NEPHROLOGY | Facility: CLINIC | Age: 87
End: 2022-05-03
Payer: MEDICARE

## 2022-05-03 VITALS
SYSTOLIC BLOOD PRESSURE: 102 MMHG | OXYGEN SATURATION: 99 % | WEIGHT: 157 LBS | DIASTOLIC BLOOD PRESSURE: 58 MMHG | BODY MASS INDEX: 26.8 KG/M2 | HEART RATE: 100 BPM | HEIGHT: 64 IN

## 2022-05-03 LAB
-  AMPICILLIN: SIGNIFICANT CHANGE UP
-  CIPROFLOXACIN: SIGNIFICANT CHANGE UP
-  DAPTOMYCIN: SIGNIFICANT CHANGE UP
-  GENTAMICIN SYNERGY: SIGNIFICANT CHANGE UP
-  LEVOFLOXACIN: SIGNIFICANT CHANGE UP
-  LINEZOLID: SIGNIFICANT CHANGE UP
-  NITROFURANTOIN: SIGNIFICANT CHANGE UP
-  PENICILLIN: SIGNIFICANT CHANGE UP
-  RIFAMPIN: SIGNIFICANT CHANGE UP
-  STREPTOMYCIN SYNERGY: SIGNIFICANT CHANGE UP
-  TETRACYCLINE: SIGNIFICANT CHANGE UP
-  VANCOMYCIN: SIGNIFICANT CHANGE UP
CULTURE RESULTS: SIGNIFICANT CHANGE UP
METHOD TYPE: SIGNIFICANT CHANGE UP
ORGANISM # SPEC MICROSCOPIC CNT: SIGNIFICANT CHANGE UP
ORGANISM # SPEC MICROSCOPIC CNT: SIGNIFICANT CHANGE UP
SPECIMEN SOURCE: SIGNIFICANT CHANGE UP

## 2022-05-03 PROCEDURE — 99496 TRANSJ CARE MGMT HIGH F2F 7D: CPT | Mod: 25

## 2022-05-03 PROCEDURE — 36415 COLL VENOUS BLD VENIPUNCTURE: CPT

## 2022-05-03 RX ORDER — PANTOPRAZOLE 40 MG/1
40 TABLET, DELAYED RELEASE ORAL
Qty: 90 | Refills: 0 | Status: DISCONTINUED | COMMUNITY
Start: 2018-12-28 | End: 2022-05-03

## 2022-05-03 RX ORDER — ALLOPURINOL 300 MG/1
300 TABLET ORAL
Qty: 90 | Refills: 1 | Status: COMPLETED | COMMUNITY
Start: 2019-05-20 | End: 2022-05-03

## 2022-05-03 NOTE — REVIEW OF SYSTEMS
[Eye Pain] : no eye pain [Red Eyes] : eyes not red [Earache] : no earache [Nosebleeds] : no nosebleeds [Chest Pain] : no chest pain [Skin Lesions] : no skin lesions [Itching] : no itching [Dizziness] : no dizziness [Fainting] : no fainting [Anxiety] : no anxiety [Depression] : no depression [Muscle Weakness] : no muscle weakness [Negative] : Genitourinary

## 2022-05-03 NOTE — PHYSICAL EXAM
[General Appearance - Alert] : alert [General Appearance - In No Acute Distress] : in no acute distress [Sclera] : the sclera and conjunctiva were normal [Strabismus] : no strabismus was seen [Outer Ear] : the ears and nose were normal in appearance [Neck Appearance] : the appearance of the neck was normal [Neck Cervical Mass (___cm)] : no neck mass was observed [] : no respiratory distress [Respiration, Rhythm And Depth] : normal respiratory rhythm and effort [Auscultation Breath Sounds / Voice Sounds] : lungs were clear to auscultation bilaterally [Heart Rate And Rhythm] : heart rate was normal and rhythm regular [Heart Sounds] : normal S1 and S2 [FreeTextEntry1] : ppm in left chest wall [Edema] : there was no peripheral edema [Bowel Sounds] : normal bowel sounds [Abdomen Soft] : soft [Abdomen Tenderness] : non-tender [No CVA Tenderness] : no ~M costovertebral angle tenderness [Skin Color & Pigmentation] : normal skin color and pigmentation [No Focal Deficits] : no focal deficits [Oriented To Time, Place, And Person] : oriented to person, place, and time [Affect] : the affect was normal [Mood] : the mood was normal

## 2022-05-03 NOTE — ASSESSMENT
[FreeTextEntry1] : 1) CKD IV in the setting of long standing HTN, partial nephrectomy\par Scr stable; lytes wnl\par he wouldn't  want to pursue dialysis if need arises.\par \par 2) HTN/ Volume\par Bp stable; pt euvolemic on exam\par Pt off lasix; repeating labs today\par \par 3) Anemia of CKD with superimposed iron def\par Hb at goal- continue po iron\par \par 4) BPH\par Continue Flomax\par \par RTC in 1 mo

## 2022-05-03 NOTE — HISTORY OF PRESENT ILLNESS
[FreeTextEntry1] : Patient is an 89 year old man with past medical history of BPH, HTN, Gout, Pre-Diabetic, benign R sided renal mass s/p partial nephrectomy in 2006, here for follow up of CKD.\par \par Today, \par Pt seen and examined; states he feels good.\par No new complaint.\par Feels well.\par Labs and meds reviewed\par He was started on amiodarone by Dr. Marquez\par \par Lives with his son and daughter in law.\par \par \par \par \par Current: Pt here for post hospitalization discharge visit; was admitted to [facility] from April 27th-May 1st 2022; followed up by Yuni Fernandez RN within 24 hours of discharge; here within 7 days of discharge for follow up. No complaints; feels well; off diuretic at current;\par \par

## 2022-05-04 ENCOUNTER — APPOINTMENT (OUTPATIENT)
Dept: INTERNAL MEDICINE | Facility: CLINIC | Age: 87
End: 2022-05-04
Payer: MEDICARE

## 2022-05-04 VITALS
BODY MASS INDEX: 26.98 KG/M2 | HEART RATE: 101 BPM | SYSTOLIC BLOOD PRESSURE: 98 MMHG | WEIGHT: 158 LBS | RESPIRATION RATE: 16 BRPM | TEMPERATURE: 98.4 F | DIASTOLIC BLOOD PRESSURE: 59 MMHG | OXYGEN SATURATION: 97 % | HEIGHT: 64 IN

## 2022-05-04 DIAGNOSIS — Z09 ENCOUNTER FOR FOLLOW-UP EXAMINATION AFTER COMPLETED TREATMENT FOR CONDITIONS OTHER THAN MALIGNANT NEOPLASM: ICD-10-CM

## 2022-05-04 DIAGNOSIS — R10.9 UNSPECIFIED ABDOMINAL PAIN: ICD-10-CM

## 2022-05-04 LAB — DEPRECATED AMINO ACIDS UR-IMP: ABNORMAL

## 2022-05-04 PROCEDURE — 99496 TRANSJ CARE MGMT HIGH F2F 7D: CPT

## 2022-05-04 NOTE — PHYSICAL EXAM
[Pedal Pulses Present] : the pedal pulses are present [No Edema] : there was no peripheral edema [Normal] : normal gait, coordination grossly intact, no focal deficits and deep tendon reflexes were 2+ and symmetric

## 2022-05-04 NOTE — HISTORY OF PRESENT ILLNESS
[Post-hospitalization from ___ Hospital] : Post-hospitalization from [unfilled] Hospital [Admitted on: ___] : The patient was admitted on [unfilled] [Discharged on ___] : discharged on [unfilled] [Discharge Summary] : discharge summary [Discharge Med List] : discharge medication list [FreeTextEntry2] : Mr. ZACKARY flores  is a 89 yo male with a Hx of  BPH,  DM2 diet controlled, CKD4, PVD,HTN, afib on Eliquis, anemia, gout, spinal stenosis, OA, heart block/bradycardia s/p PPM, dilated cardiomyopathy with EF 70%.\par He was admitted for acute on chronic renal failure.\par He was given IV hydration, allopurinol and Lasix was discontinued.  His kidney function improved a little he was discharged to follow-up with nephrology.\par Patient have seen nephrology had repeat blood works done pending results.  Patient had a discussion with nephrology about dialysis which he refused if needed.\par He is doing well today his vitals are stable, no ankle edema.

## 2022-05-04 NOTE — ASSESSMENT
[FreeTextEntry1] : Patient is here for further follow-up from hospital discharge.\par He was admitted with acute kidney injury.  His kidney function improved and he was discharged home for follow-up with nephrology.\par I reviewed hospital labs and discharge note.\par Patient seen nephrology on 05/03.  I reviewed the note.  He had labs drawn at nephrology clinic yesterday.\par Reconciled discharge medication.  Allopurinol and Lasix are on hold.\par Patient is doing well since discharge from hospital.\par \par Hypertension:\par Blood pressure is slightly lower than the normal range.  Mainly is not symptomatic denied any dizziness or lightheadedness.\par He is on amlodipine 5 mg, metoprolol succinate 25 mg.\par Atrial fibrillation:\par Have appointment to see cardiology next month he is on metoprolol and Eliquis 2.5 mg twice daily.\par His Ecotrin 81 mg is on hold as he had some blood tinge in his sputum.

## 2022-05-05 ENCOUNTER — FORM ENCOUNTER (OUTPATIENT)
Age: 87
End: 2022-05-05

## 2022-05-05 LAB
ALBUMIN SERPL ELPH-MCNC: 4.2 G/DL
ANION GAP SERPL CALC-SCNC: 15 MMOL/L
APPEARANCE: CLEAR
BACTERIA: NEGATIVE
BASOPHILS # BLD AUTO: 0.04 K/UL
BASOPHILS NFR BLD AUTO: 0.7 %
BILIRUBIN URINE: NEGATIVE
BLOOD URINE: NEGATIVE
BUN SERPL-MCNC: 50 MG/DL
CALCIUM SERPL-MCNC: 9.2 MG/DL
CALCIUM SERPL-MCNC: 9.2 MG/DL
CHLORIDE SERPL-SCNC: 105 MMOL/L
CO2 SERPL-SCNC: 22 MMOL/L
COLOR: COLORLESS
CREAT SERPL-MCNC: 2.91 MG/DL
CREAT SPEC-SCNC: 17 MG/DL
CREAT/PROT UR: 0.5 RATIO
EGFR: 20 ML/MIN/1.73M2
EOSINOPHIL # BLD AUTO: 0.09 K/UL
EOSINOPHIL NFR BLD AUTO: 1.5 %
GLUCOSE QUALITATIVE U: NEGATIVE
GLUCOSE SERPL-MCNC: 103 MG/DL
HCT VFR BLD CALC: 33.2 %
HGB BLD-MCNC: 10.6 G/DL
HYALINE CASTS: 0 /LPF
IMM GRANULOCYTES NFR BLD AUTO: 0.5 %
KETONES URINE: NEGATIVE
LEUKOCYTE ESTERASE URINE: ABNORMAL
LYMPHOCYTES # BLD AUTO: 1.01 K/UL
LYMPHOCYTES NFR BLD AUTO: 17.1 %
MAN DIFF?: NORMAL
MCHC RBC-ENTMCNC: 31.4 PG
MCHC RBC-ENTMCNC: 31.9 GM/DL
MCV RBC AUTO: 98.2 FL
MICROSCOPIC-UA: NORMAL
MONOCYTES # BLD AUTO: 0.46 K/UL
MONOCYTES NFR BLD AUTO: 7.8 %
NEUTROPHILS # BLD AUTO: 4.29 K/UL
NEUTROPHILS NFR BLD AUTO: 72.4 %
NITRITE URINE: NEGATIVE
PARATHYROID HORMONE INTACT: 102 PG/ML
PH URINE: 6
PHOSPHATE SERPL-MCNC: 3.8 MG/DL
PLATELET # BLD AUTO: 199 K/UL
POTASSIUM SERPL-SCNC: 4.4 MMOL/L
PROT UR-MCNC: 8 MG/DL
PROTEIN URINE: NEGATIVE
RBC # BLD: 3.38 M/UL
RBC # FLD: 16.2 %
RED BLOOD CELLS URINE: 0 /HPF
SODIUM SERPL-SCNC: 142 MMOL/L
SPECIFIC GRAVITY URINE: 1.01
SQUAMOUS EPITHELIAL CELLS: 0 /HPF
UROBILINOGEN URINE: NORMAL
WBC # FLD AUTO: 5.92 K/UL
WHITE BLOOD CELLS URINE: 16 /HPF

## 2022-05-06 DIAGNOSIS — E78.5 HYPERLIPIDEMIA, UNSPECIFIED: ICD-10-CM

## 2022-05-06 DIAGNOSIS — E11.51 TYPE 2 DIABETES MELLITUS WITH DIABETIC PERIPHERAL ANGIOPATHY WITHOUT GANGRENE: ICD-10-CM

## 2022-05-06 DIAGNOSIS — Z95.0 PRESENCE OF CARDIAC PACEMAKER: ICD-10-CM

## 2022-05-06 DIAGNOSIS — Z88.0 ALLERGY STATUS TO PENICILLIN: ICD-10-CM

## 2022-05-06 DIAGNOSIS — E11.65 TYPE 2 DIABETES MELLITUS WITH HYPERGLYCEMIA: ICD-10-CM

## 2022-05-06 DIAGNOSIS — Z66 DO NOT RESUSCITATE: ICD-10-CM

## 2022-05-06 DIAGNOSIS — I12.9 HYPERTENSIVE CHRONIC KIDNEY DISEASE WITH STAGE 1 THROUGH STAGE 4 CHRONIC KIDNEY DISEASE, OR UNSPECIFIED CHRONIC KIDNEY DISEASE: ICD-10-CM

## 2022-05-06 DIAGNOSIS — D63.1 ANEMIA IN CHRONIC KIDNEY DISEASE: ICD-10-CM

## 2022-05-06 DIAGNOSIS — I42.0 DILATED CARDIOMYOPATHY: ICD-10-CM

## 2022-05-06 DIAGNOSIS — N17.9 ACUTE KIDNEY FAILURE, UNSPECIFIED: ICD-10-CM

## 2022-05-06 DIAGNOSIS — E86.0 DEHYDRATION: ICD-10-CM

## 2022-05-06 DIAGNOSIS — M10.9 GOUT, UNSPECIFIED: ICD-10-CM

## 2022-05-06 DIAGNOSIS — N40.0 BENIGN PROSTATIC HYPERPLASIA WITHOUT LOWER URINARY TRACT SYMPTOMS: ICD-10-CM

## 2022-05-06 DIAGNOSIS — N18.4 CHRONIC KIDNEY DISEASE, STAGE 4 (SEVERE): ICD-10-CM

## 2022-05-06 DIAGNOSIS — N13.9 OBSTRUCTIVE AND REFLUX UROPATHY, UNSPECIFIED: ICD-10-CM

## 2022-05-06 DIAGNOSIS — Z91.013 ALLERGY TO SEAFOOD: ICD-10-CM

## 2022-05-06 DIAGNOSIS — I48.91 UNSPECIFIED ATRIAL FIBRILLATION: ICD-10-CM

## 2022-05-06 DIAGNOSIS — E11.22 TYPE 2 DIABETES MELLITUS WITH DIABETIC CHRONIC KIDNEY DISEASE: ICD-10-CM

## 2022-05-06 DIAGNOSIS — Z79.01 LONG TERM (CURRENT) USE OF ANTICOAGULANTS: ICD-10-CM

## 2022-05-06 PROBLEM — R10.9 ABDOMINAL PAIN: Status: ACTIVE | Noted: 2022-05-06

## 2022-05-23 ENCOUNTER — APPOINTMENT (OUTPATIENT)
Dept: FAMILY MEDICINE | Facility: CLINIC | Age: 87
End: 2022-05-23
Payer: MEDICARE

## 2022-05-23 VITALS
HEART RATE: 100 BPM | BODY MASS INDEX: 27.14 KG/M2 | RESPIRATION RATE: 16 BRPM | SYSTOLIC BLOOD PRESSURE: 92 MMHG | WEIGHT: 159 LBS | DIASTOLIC BLOOD PRESSURE: 52 MMHG | OXYGEN SATURATION: 99 % | HEIGHT: 64 IN | TEMPERATURE: 98.1 F

## 2022-05-23 PROCEDURE — 36415 COLL VENOUS BLD VENIPUNCTURE: CPT

## 2022-05-23 PROCEDURE — 99214 OFFICE O/P EST MOD 30 MIN: CPT | Mod: 25

## 2022-05-23 NOTE — PHYSICAL EXAM
[Normal Sclera/Conjunctiva] : normal sclera/conjunctiva [Supple] : supple [Normal Rate] : normal rate  [Normal S1, S2] : normal S1 and S2 [Pedal Pulses Present] : the pedal pulses are present [No Edema] : there was no peripheral edema [No CVA Tenderness] : no CVA  tenderness [No Spinal Tenderness] : no spinal tenderness [No Rash] : no rash [Normal] : affect was normal and insight and judgment were intact [de-identified] : irregular

## 2022-05-23 NOTE — ASSESSMENT
[FreeTextEntry1] : CKD\par Patient seen nephrology Dr Lora on 05/03. \par Labs in chart\par Allopurinol and Lasix are on hold.\par Patient is doing well since discharge from hospital.\par \par Hypertension:\par Blood pressure is slightly low\par He is asymptomatic.\par He is on amlodipine 5 mg, metoprolol succinate 25 mg.\par \par Atrial fibrillation:\par  cardiology 6/22\par on metoprolol and Eliquis 2.5 mg twice daily.\par Restart Ecotrin 81 mg \par Follow up in 3 months

## 2022-05-23 NOTE — HISTORY OF PRESENT ILLNESS
[FreeTextEntry1] : Follow up [de-identified] : Mr. TAVIA RAYMUNDO is a 89 year old male presenting for a follow up\par Recent Hospitalization for LUIS ALFREDO\par Improved.\par Seen by Nephrologist recently\par On atorvastatin, wants Rx renewed.\par \par HTN BP is low. On Amlodipine and Metoprolol. No complaints of dizziness.\par HLD on Atorvastatin.\par A1C elevated, diet is okay.\par No complaints.\par Seen by cardiologist Dr Marquez 6/21\par Dermatology follow up for Skin Ca\par CKD, Seen by Dr Peralta, nephrologist 5/22. Renal panel done stable\par A Fib on Eliquis. Amidarone. Dr Allred. on Metoprolol\par Nocturia goes 3 times at night. On Tamsulosin\par

## 2022-05-30 ENCOUNTER — TRANSCRIPTION ENCOUNTER (OUTPATIENT)
Age: 87
End: 2022-05-30

## 2022-05-30 LAB
ALBUMIN SERPL ELPH-MCNC: 4.4 G/DL
ANION GAP SERPL CALC-SCNC: 14 MMOL/L
BUN SERPL-MCNC: 45 MG/DL
CALCIUM SERPL-MCNC: 9.2 MG/DL
CHLORIDE SERPL-SCNC: 104 MMOL/L
CO2 SERPL-SCNC: 24 MMOL/L
CREAT SERPL-MCNC: 2.63 MG/DL
EGFR: 22 ML/MIN/1.73M2
GLUCOSE SERPL-MCNC: 115 MG/DL
PHOSPHATE SERPL-MCNC: 3.7 MG/DL
POTASSIUM SERPL-SCNC: 4.3 MMOL/L
SODIUM SERPL-SCNC: 142 MMOL/L

## 2022-05-31 ENCOUNTER — RX RENEWAL (OUTPATIENT)
Age: 87
End: 2022-05-31

## 2022-06-01 ENCOUNTER — FORM ENCOUNTER (OUTPATIENT)
Age: 87
End: 2022-06-01

## 2022-06-07 ENCOUNTER — FORM ENCOUNTER (OUTPATIENT)
Age: 87
End: 2022-06-07

## 2022-06-09 ENCOUNTER — APPOINTMENT (OUTPATIENT)
Dept: INTERNAL MEDICINE | Facility: CLINIC | Age: 87
End: 2022-06-09

## 2022-06-14 ENCOUNTER — APPOINTMENT (OUTPATIENT)
Dept: NEPHROLOGY | Facility: CLINIC | Age: 87
End: 2022-06-14
Payer: MEDICARE

## 2022-06-14 VITALS
BODY MASS INDEX: 26.29 KG/M2 | HEART RATE: 98 BPM | HEIGHT: 64 IN | OXYGEN SATURATION: 97 % | DIASTOLIC BLOOD PRESSURE: 56 MMHG | SYSTOLIC BLOOD PRESSURE: 98 MMHG | WEIGHT: 154 LBS

## 2022-06-14 PROCEDURE — 99215 OFFICE O/P EST HI 40 MIN: CPT

## 2022-06-14 RX ORDER — ASPIRIN 81 MG
81 TABLET, DELAYED RELEASE (ENTERIC COATED) ORAL DAILY
Refills: 0 | Status: DISCONTINUED | COMMUNITY
End: 2022-06-14

## 2022-06-14 NOTE — ASSESSMENT
[FreeTextEntry1] : 1) CKD IV in the setting of long standing HTN, partial nephrectomy\par Scr stable; lytes wnl\par he wouldn't  want to pursue dialysis if need arises.\par \par 2) HTN/ Volume\par Bp stable; pt euvolemic on exam\par Pt off lasix; repeating labs today\par \par 3) Anemia of CKD with superimposed iron def\par Hb at goal- continue po iron\par \par 4) BPH\par Continue Flomax\par \par RTC in 4mo Detail Level: Simple Other (Free Text): Patient will take her antivirals presently and prior to next treatment Note Text (......Xxx Chief Complaint.): This diagnosis correlates with the

## 2022-06-14 NOTE — HISTORY OF PRESENT ILLNESS
[FreeTextEntry1] : Patient is an 89 year old man with past medical history of BPH, HTN, Gout, Pre-Diabetic, benign R sided renal mass s/p partial nephrectomy in 2006, here for follow up of CKD.\par \par Today, \par Pt seen and examined; states he feels good.\par No new complaint.\par Feels well.\par Labs and meds reviewed\par He was started on amiodarone by Dr. Marquez\par \par Lives with his son and daughter in law.\par Pt here for post hospitalization discharge visit; was admitted to [facility] from April 27th-May 1st 2022; followed up by Yuni Fernandez RN within 24 hours of discharge; here within 7 days of discharge for follow up. No complaints; feels well; off diuretic at current;\par \par \par Current: Pt seen/examined; no complaints;\par

## 2022-06-15 ENCOUNTER — APPOINTMENT (OUTPATIENT)
Dept: CARDIOLOGY | Facility: CLINIC | Age: 87
End: 2022-06-15
Payer: MEDICARE

## 2022-06-15 ENCOUNTER — NON-APPOINTMENT (OUTPATIENT)
Age: 87
End: 2022-06-15

## 2022-06-15 VITALS
WEIGHT: 154 LBS | HEIGHT: 66 IN | DIASTOLIC BLOOD PRESSURE: 60 MMHG | BODY MASS INDEX: 24.75 KG/M2 | SYSTOLIC BLOOD PRESSURE: 94 MMHG | HEART RATE: 112 BPM

## 2022-06-15 PROCEDURE — 99214 OFFICE O/P EST MOD 30 MIN: CPT | Mod: 25

## 2022-06-15 PROCEDURE — 93000 ELECTROCARDIOGRAM COMPLETE: CPT

## 2022-06-15 RX ORDER — FUROSEMIDE 20 MG/1
20 TABLET ORAL
Qty: 180 | Refills: 1 | Status: DISCONTINUED | COMMUNITY
Start: 2019-02-27 | End: 2022-06-15

## 2022-06-15 RX ORDER — AMLODIPINE BESYLATE 5 MG/1
5 TABLET ORAL
Qty: 90 | Refills: 3 | Status: DISCONTINUED | COMMUNITY
Start: 2020-08-28 | End: 2022-06-15

## 2022-06-15 NOTE — HISTORY OF PRESENT ILLNESS
[FreeTextEntry1] : Pt is a 89 y/o M who presents today for f/u. 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  \par He was recently hospitalized 05/2022 for acute on chronic renal failure, following with nephrology\par He is no longer on lasix.  His HR has been high since discharge (VS reviewed)\par He states that at times he feels dizzy.  He is compliant with medications. He denies any bleeding problems\par COVID vaccine 03/2021 Pfizer, booster 10/2021\par \par TTE 02/2019 EF 77% mild-mod MR/TR, mild AI, mild-mod LVH\par Nuclear stress test 02/2019 normal myocardial perfusion\par \par labs 01/2022, 05/2022\par Tchol 136\par HDL 70\par LDL 57\par A1c 6.3\par Cr 2.63\par \par PMH: PVD s/p peripheral stent RLE with Dr Singh, afib on Eliquis, ambulates with cane, HLD, HTN, colon cancer, PPM\par Smoking status: quit in 1963\par Current exercise: none\par Daily water intake: 50 oz\par Daily caffeine intake: 1-2 cups coffee\par OTC medications: ASA\par Family hx: pt denies any immediate family history cardiac disease\par Previous hospitalizations: gout/cellulitis\par surgeries: colon cancer, partial nephrectomy 2006 benign mass\par

## 2022-06-15 NOTE — DISCUSSION/SUMMARY
[FreeTextEntry1] : Pt is a 89 y/o M who presents today for f/u. PMH afib on Eliquis, CKD, HTN, PVD, HLD, CHB s/p PPM with Dr Allred 02/2020.\par \par TTE 02/2019 EF 77% mild-mod MR/TR, mild AI, mild-mod LVH\par Nuclear stress test 02/2019 normal myocardial perfusion\par \par AF:\par HR elevated\par c/w amio\par increase BB\par c/w Eliquis - no bleeding problems\par \par HTN:\par well controlled- on low side\par stop amlodipine\par c/w BB\par Advised low salt diet, regular exercise, weight loss \par \par HLD:\par c/w statin\par goal LDL <70\par Advised lifestyle modifications \par \par Pt will return in 1 mnths or sooner as needed but I encouraged communication via phone or portal if necessary. \par The described plan was discussed with the pt and son.  All questions and concerns were addressed to the best of my knowledge. \par \par \par

## 2022-06-28 ENCOUNTER — APPOINTMENT (OUTPATIENT)
Dept: ELECTROPHYSIOLOGY | Facility: CLINIC | Age: 87
End: 2022-06-28

## 2022-06-28 ENCOUNTER — NON-APPOINTMENT (OUTPATIENT)
Age: 87
End: 2022-06-28

## 2022-06-28 PROCEDURE — 93296 REM INTERROG EVL PM/IDS: CPT

## 2022-06-28 PROCEDURE — 93294 REM INTERROG EVL PM/LDLS PM: CPT

## 2022-07-13 ENCOUNTER — APPOINTMENT (OUTPATIENT)
Dept: NEPHROLOGY | Facility: CLINIC | Age: 87
End: 2022-07-13

## 2022-07-13 ENCOUNTER — NON-APPOINTMENT (OUTPATIENT)
Age: 87
End: 2022-07-13

## 2022-07-13 VITALS
BODY MASS INDEX: 26.2 KG/M2 | OXYGEN SATURATION: 97 % | WEIGHT: 163 LBS | DIASTOLIC BLOOD PRESSURE: 84 MMHG | SYSTOLIC BLOOD PRESSURE: 128 MMHG | HEART RATE: 92 BPM | HEIGHT: 66 IN

## 2022-07-13 DIAGNOSIS — M10.9 GOUT, UNSPECIFIED: ICD-10-CM

## 2022-07-13 PROCEDURE — 36415 COLL VENOUS BLD VENIPUNCTURE: CPT

## 2022-07-13 PROCEDURE — 99214 OFFICE O/P EST MOD 30 MIN: CPT | Mod: 25

## 2022-07-13 NOTE — ASSESSMENT
[FreeTextEntry1] : 1) CKD IV in the setting of long standing HTN, partial nephrectomy\par Scr stable; lytes wnl\par he wouldn't  want to pursue dialysis if need arises.\par \par 2) HTN/ Volume\par Bp stable; pt euvolemic on exam\par Pt off lasix; repeating labs today\par \par 3) Anemia of CKD with superimposed iron def\par Hb at goal- continue po iron\par \par 4) BPH\par Continue Flomax\par \par Medications reconciled in office today\par Repeat CKD, A1c and Uric acid done in office\par \par RTC in 4mo

## 2022-07-13 NOTE — HISTORY OF PRESENT ILLNESS
[___ Month(s) Ago] : [unfilled] month(s) ago [Ordering Test(s) ___] : ordering [unfilled] [Hypertensive Nephropathy] : hypertensive nephropathy [None] : ~he/she~ is currently asymptomatic [Diuretics] : diuretics [Antihypertensives] : antihypertensives [Vitamin D] : vitamin D [Iron Supplement] : iron supplement [Good Compliance] : good compliance  [Good Tolerance] : good tolerance  [Good Symptom Control] : good symptom control [FreeTextEntry1] : Patient is an 89 year old man with past medical history of BPH, HTN, Gout, Pre-Diabetic, benign R sided renal mass s/p partial nephrectomy in 2006, here for follow up of CKD.\par \par Today, \par Pt seen and examined; states he feels good.\par No new complaint.\par Feels well.\par Labs and meds reviewed\par He was started on amiodarone by Dr. Marquez\par \par Lives with his son and daughter in law.\par Pt was admitted to [facility] from April 27th-May 1st 2022; followed up by Yuni Fernandez RN within 24 hours of discharge. No complaints; feels well; off diuretic at current;\par \par \par Current: Pt seen/examined; no complaints;\par

## 2022-07-13 NOTE — REVIEW OF SYSTEMS
[Eye Pain] : no eye pain [Red Eyes] : eyes not red [Earache] : no earache [Nosebleeds] : no nosebleeds [Chest Pain] : no chest pain [Skin Lesions] : no skin lesions [Itching] : no itching [Dizziness] : no dizziness [Fainting] : no fainting [Anxiety] : no anxiety [Depression] : no depression [Muscle Weakness] : no muscle weakness [Negative] : Heme/Lymph

## 2022-07-13 NOTE — PHYSICAL EXAM
[General Appearance - Alert] : alert [General Appearance - In No Acute Distress] : in no acute distress [Sclera] : the sclera and conjunctiva were normal [Strabismus] : no strabismus was seen [Outer Ear] : the ears and nose were normal in appearance [Neck Appearance] : the appearance of the neck was normal [Neck Cervical Mass (___cm)] : no neck mass was observed [Respiration, Rhythm And Depth] : normal respiratory rhythm and effort [Auscultation Breath Sounds / Voice Sounds] : lungs were clear to auscultation bilaterally [Heart Rate And Rhythm] : heart rate was normal and rhythm regular [Heart Sounds] : normal S1 and S2 [Edema] : there was no peripheral edema [Abdomen Soft] : soft [Abdomen Tenderness] : non-tender [No CVA Tenderness] : no ~M costovertebral angle tenderness [Skin Color & Pigmentation] : normal skin color and pigmentation [No Focal Deficits] : no focal deficits [Oriented To Time, Place, And Person] : oriented to person, place, and time [Affect] : the affect was normal [Mood] : the mood was normal [General Appearance - Well Nourished] : well nourished [General Appearance - Well Developed] : well developed [General Appearance - Well-Appearing] : healthy appearing [Hearing Threshold Finger Rub Not [place]] : hearing was normal [Jugular Venous Distention Increased] : there was no jugular-venous distention [Exaggerated Use Of Accessory Muscles For Inspiration] : no accessory muscle use [Apical Impulse] : the apical impulse was normal [FreeTextEntry1] : Deferred [Cervical Lymph Nodes Enlarged Anterior Bilaterally] : anterior cervical [Supraclavicular Lymph Nodes Enlarged Bilaterally] : supraclavicular [Abnormal Walk] : normal gait [Involuntary Movements] : no involuntary movements were seen [Skin Turgor] : normal skin turgor [] : no rash [Skin Lesions] : no skin lesions [Impaired Insight] : insight and judgment were intact

## 2022-07-14 ENCOUNTER — NON-APPOINTMENT (OUTPATIENT)
Age: 87
End: 2022-07-14

## 2022-07-14 ENCOUNTER — APPOINTMENT (OUTPATIENT)
Dept: CARDIOLOGY | Facility: CLINIC | Age: 87
End: 2022-07-14

## 2022-07-14 VITALS
DIASTOLIC BLOOD PRESSURE: 72 MMHG | SYSTOLIC BLOOD PRESSURE: 120 MMHG | BODY MASS INDEX: 26.2 KG/M2 | WEIGHT: 163 LBS | HEIGHT: 66 IN | OXYGEN SATURATION: 96 % | HEART RATE: 101 BPM

## 2022-07-14 LAB
25(OH)D3 SERPL-MCNC: 49.1 NG/ML
ALBUMIN SERPL ELPH-MCNC: 4.2 G/DL
ANION GAP SERPL CALC-SCNC: 10 MMOL/L
BASOPHILS # BLD AUTO: 0.05 K/UL
BASOPHILS NFR BLD AUTO: 0.8 %
BUN SERPL-MCNC: 23 MG/DL
CALCIUM SERPL-MCNC: 9.2 MG/DL
CALCIUM SERPL-MCNC: 9.2 MG/DL
CHLORIDE SERPL-SCNC: 109 MMOL/L
CO2 SERPL-SCNC: 23 MMOL/L
CREAT SERPL-MCNC: 1.79 MG/DL
CREAT SPEC-SCNC: 48 MG/DL
CREAT/PROT UR: 0.2 RATIO
EGFR: 35 ML/MIN/1.73M2
EOSINOPHIL # BLD AUTO: 0.1 K/UL
EOSINOPHIL NFR BLD AUTO: 1.7 %
ESTIMATED AVERAGE GLUCOSE: 126 MG/DL
GLUCOSE SERPL-MCNC: 101 MG/DL
HBA1C MFR BLD HPLC: 6 %
HCT VFR BLD CALC: 36.4 %
HGB BLD-MCNC: 11.2 G/DL
IMM GRANULOCYTES NFR BLD AUTO: 0.7 %
LYMPHOCYTES # BLD AUTO: 0.89 K/UL
LYMPHOCYTES NFR BLD AUTO: 14.7 %
MAN DIFF?: NORMAL
MCHC RBC-ENTMCNC: 30.8 GM/DL
MCHC RBC-ENTMCNC: 31.7 PG
MCV RBC AUTO: 103.1 FL
MONOCYTES # BLD AUTO: 0.44 K/UL
MONOCYTES NFR BLD AUTO: 7.3 %
NEUTROPHILS # BLD AUTO: 4.53 K/UL
NEUTROPHILS NFR BLD AUTO: 74.8 %
PARATHYROID HORMONE INTACT: 60 PG/ML
PHOSPHATE SERPL-MCNC: 3.3 MG/DL
PLATELET # BLD AUTO: 157 K/UL
POTASSIUM SERPL-SCNC: 4.8 MMOL/L
PROT UR-MCNC: 10 MG/DL
RBC # BLD: 3.53 M/UL
RBC # FLD: 17 %
SODIUM SERPL-SCNC: 141 MMOL/L
URATE SERPL-MCNC: 4.2 MG/DL
WBC # FLD AUTO: 6.05 K/UL

## 2022-07-14 PROCEDURE — 99214 OFFICE O/P EST MOD 30 MIN: CPT | Mod: 25

## 2022-07-14 PROCEDURE — 93000 ELECTROCARDIOGRAM COMPLETE: CPT

## 2022-07-14 NOTE — HISTORY OF PRESENT ILLNESS
[FreeTextEntry1] : Pt is a 89 y/o M who presents today for f/u. 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  \par He was hospitalized 05/2022 for acute on chronic renal failure, following with nephrology\par He is no longer on lasix.  Cr improved 2.63 to 1.79\par PPM interrogation reviewed - avg HR 86\par Pt reports feeling well and has no active cardiac complaints - denies CP, SOB, palpitations, dizziness, syncope, edema, orthopnea, PND, orthopnea.  No exertional symptoms.  He denies any bleeding problems\par COVID vaccine 03/2021 Pfizer, booster 10/2021\par \par TTE 02/2019 EF 77% mild-mod MR/TR, mild AI, mild-mod LVH\par Nuclear stress test 02/2019 normal myocardial perfusion\par \par Tchol 136\par HDL 70\par LDL 57\par A1c 6.3\par Cr 2.63 - 1.79\par \par PMH: PVD s/p peripheral stent RLE with Dr Singh, afib on Eliquis, ambulates with cane, HLD, HTN, colon cancer, PPM\par Smoking status: quit in 1963\par Current exercise: none\par Daily water intake: 50 oz\par Daily caffeine intake: 1-2 cups coffee\par OTC medications: ASA\par Family hx: pt denies any immediate family history cardiac disease\par Previous hospitalizations: gout/cellulitis\par surgeries: colon cancer, partial nephrectomy 2006 benign mass\par

## 2022-07-14 NOTE — DISCUSSION/SUMMARY
[FreeTextEntry1] : Pt is a 89 y/o M who presents today for f/u. PMH afib on Eliquis, CKD, HTN, PVD, HLD, CHB s/p PPM with Dr Allred 02/2020.\par \par TTE 02/2019 EF 77% mild-mod MR/TR, mild AI, mild-mod LVH\par Nuclear stress test 02/2019 normal myocardial perfusion\par \par AF:\par HR improved\par c/w amio and BB\par c/w Eliquis - no bleeding problems\par \par HTN:\par well controlled\par c/w BB\par Advised low salt diet, regular exercise, weight loss \par \par HLD:\par c/w statin\par Advised lifestyle modifications \par \par Pt will return in 4-6 mnths or sooner as needed but I encouraged communication via phone or portal if necessary. \par The described plan was discussed with the pt.  All questions and concerns were addressed to the best of my knowledge. \par \par \par

## 2022-07-15 ENCOUNTER — NON-APPOINTMENT (OUTPATIENT)
Age: 87
End: 2022-07-15

## 2022-07-15 LAB
APPEARANCE: CLEAR
BACTERIA: NEGATIVE
BILIRUBIN URINE: NEGATIVE
BLOOD URINE: ABNORMAL
COLOR: NORMAL
GLUCOSE QUALITATIVE U: NEGATIVE
HYALINE CASTS: 0 /LPF
KETONES URINE: NEGATIVE
LEUKOCYTE ESTERASE URINE: ABNORMAL
MICROSCOPIC-UA: NORMAL
NITRITE URINE: NEGATIVE
PH URINE: 6
PROTEIN URINE: NEGATIVE
RED BLOOD CELLS URINE: 3 /HPF
SPECIFIC GRAVITY URINE: 1.01
SQUAMOUS EPITHELIAL CELLS: 0 /HPF
UROBILINOGEN URINE: NORMAL
WHITE BLOOD CELLS URINE: 48 /HPF

## 2022-07-25 ENCOUNTER — EMERGENCY (EMERGENCY)
Facility: HOSPITAL | Age: 87
LOS: 0 days | Discharge: HOME | End: 2022-07-25
Attending: EMERGENCY MEDICINE
Payer: MEDICARE

## 2022-07-25 VITALS
OXYGEN SATURATION: 96 % | HEIGHT: 65 IN | DIASTOLIC BLOOD PRESSURE: 82 MMHG | TEMPERATURE: 98 F | SYSTOLIC BLOOD PRESSURE: 138 MMHG | HEART RATE: 59 BPM | RESPIRATION RATE: 18 BRPM | WEIGHT: 130.07 LBS

## 2022-07-25 VITALS
TEMPERATURE: 98 F | OXYGEN SATURATION: 97 % | DIASTOLIC BLOOD PRESSURE: 86 MMHG | HEART RATE: 88 BPM | RESPIRATION RATE: 20 BRPM | SYSTOLIC BLOOD PRESSURE: 127 MMHG

## 2022-07-25 DIAGNOSIS — Z91.013 ALLERGY TO SEAFOOD: ICD-10-CM

## 2022-07-25 DIAGNOSIS — E11.22 TYPE 2 DIABETES MELLITUS WITH DIABETIC CHRONIC KIDNEY DISEASE: ICD-10-CM

## 2022-07-25 DIAGNOSIS — N18.9 CHRONIC KIDNEY DISEASE, UNSPECIFIED: ICD-10-CM

## 2022-07-25 DIAGNOSIS — I48.91 UNSPECIFIED ATRIAL FIBRILLATION: ICD-10-CM

## 2022-07-25 DIAGNOSIS — U07.1 COVID-19: ICD-10-CM

## 2022-07-25 DIAGNOSIS — I12.9 HYPERTENSIVE CHRONIC KIDNEY DISEASE WITH STAGE 1 THROUGH STAGE 4 CHRONIC KIDNEY DISEASE, OR UNSPECIFIED CHRONIC KIDNEY DISEASE: ICD-10-CM

## 2022-07-25 DIAGNOSIS — E11.51 TYPE 2 DIABETES MELLITUS WITH DIABETIC PERIPHERAL ANGIOPATHY WITHOUT GANGRENE: ICD-10-CM

## 2022-07-25 DIAGNOSIS — N40.0 BENIGN PROSTATIC HYPERPLASIA WITHOUT LOWER URINARY TRACT SYMPTOMS: ICD-10-CM

## 2022-07-25 DIAGNOSIS — Z95.0 PRESENCE OF CARDIAC PACEMAKER: ICD-10-CM

## 2022-07-25 DIAGNOSIS — Z79.01 LONG TERM (CURRENT) USE OF ANTICOAGULANTS: ICD-10-CM

## 2022-07-25 DIAGNOSIS — Z95.0 PRESENCE OF CARDIAC PACEMAKER: Chronic | ICD-10-CM

## 2022-07-25 DIAGNOSIS — I73.9 PERIPHERAL VASCULAR DISEASE, UNSPECIFIED: ICD-10-CM

## 2022-07-25 DIAGNOSIS — M19.90 UNSPECIFIED OSTEOARTHRITIS, UNSPECIFIED SITE: ICD-10-CM

## 2022-07-25 DIAGNOSIS — Z88.0 ALLERGY STATUS TO PENICILLIN: ICD-10-CM

## 2022-07-25 LAB
ALBUMIN SERPL ELPH-MCNC: 3.1 G/DL — LOW (ref 3.3–5)
ALP SERPL-CCNC: 128 U/L — HIGH (ref 40–120)
ALT FLD-CCNC: 45 U/L — SIGNIFICANT CHANGE UP (ref 12–78)
ANION GAP SERPL CALC-SCNC: 6 MMOL/L — SIGNIFICANT CHANGE UP (ref 5–17)
APTT BLD: 40.1 SEC — HIGH (ref 27.5–35.5)
AST SERPL-CCNC: 24 U/L — SIGNIFICANT CHANGE UP (ref 15–37)
BASOPHILS # BLD AUTO: 0.04 K/UL — SIGNIFICANT CHANGE UP (ref 0–0.2)
BASOPHILS NFR BLD AUTO: 0.7 % — SIGNIFICANT CHANGE UP (ref 0–2)
BILIRUB SERPL-MCNC: 0.5 MG/DL — SIGNIFICANT CHANGE UP (ref 0.2–1.2)
BUN SERPL-MCNC: 34 MG/DL — HIGH (ref 7–23)
CALCIUM SERPL-MCNC: 8.6 MG/DL — SIGNIFICANT CHANGE UP (ref 8.5–10.1)
CHLORIDE SERPL-SCNC: 112 MMOL/L — HIGH (ref 96–108)
CO2 SERPL-SCNC: 24 MMOL/L — SIGNIFICANT CHANGE UP (ref 22–31)
CREAT SERPL-MCNC: 1.95 MG/DL — HIGH (ref 0.5–1.3)
EGFR: 32 ML/MIN/1.73M2 — LOW
EOSINOPHIL # BLD AUTO: 0.08 K/UL — SIGNIFICANT CHANGE UP (ref 0–0.5)
EOSINOPHIL NFR BLD AUTO: 1.4 % — SIGNIFICANT CHANGE UP (ref 0–6)
GLUCOSE SERPL-MCNC: 162 MG/DL — HIGH (ref 70–99)
HCT VFR BLD CALC: 33.1 % — LOW (ref 39–50)
HGB BLD-MCNC: 10.6 G/DL — LOW (ref 13–17)
IMM GRANULOCYTES NFR BLD AUTO: 1.4 % — SIGNIFICANT CHANGE UP (ref 0–1.5)
INR BLD: 1.58 RATIO — HIGH (ref 0.88–1.16)
LYMPHOCYTES # BLD AUTO: 0.65 K/UL — LOW (ref 1–3.3)
LYMPHOCYTES # BLD AUTO: 11.4 % — LOW (ref 13–44)
MAGNESIUM SERPL-MCNC: 2.3 MG/DL — SIGNIFICANT CHANGE UP (ref 1.6–2.6)
MCHC RBC-ENTMCNC: 32 GM/DL — SIGNIFICANT CHANGE UP (ref 32–36)
MCHC RBC-ENTMCNC: 32.1 PG — SIGNIFICANT CHANGE UP (ref 27–34)
MCV RBC AUTO: 100.3 FL — HIGH (ref 80–100)
MONOCYTES # BLD AUTO: 0.45 K/UL — SIGNIFICANT CHANGE UP (ref 0–0.9)
MONOCYTES NFR BLD AUTO: 7.9 % — SIGNIFICANT CHANGE UP (ref 2–14)
NEUTROPHILS # BLD AUTO: 4.4 K/UL — SIGNIFICANT CHANGE UP (ref 1.8–7.4)
NEUTROPHILS NFR BLD AUTO: 77.2 % — HIGH (ref 43–77)
PHOSPHATE SERPL-MCNC: 3.3 MG/DL — SIGNIFICANT CHANGE UP (ref 2.5–4.5)
PLATELET # BLD AUTO: 158 K/UL — SIGNIFICANT CHANGE UP (ref 150–400)
POTASSIUM SERPL-MCNC: 4.9 MMOL/L — SIGNIFICANT CHANGE UP (ref 3.5–5.3)
POTASSIUM SERPL-SCNC: 4.9 MMOL/L — SIGNIFICANT CHANGE UP (ref 3.5–5.3)
PROT SERPL-MCNC: 6 GM/DL — SIGNIFICANT CHANGE UP (ref 6–8.3)
PROTHROM AB SERPL-ACNC: 18.4 SEC — HIGH (ref 10.5–13.4)
RBC # BLD: 3.3 M/UL — LOW (ref 4.2–5.8)
RBC # FLD: 16.9 % — HIGH (ref 10.3–14.5)
SARS-COV-2 RNA SPEC QL NAA+PROBE: DETECTED
SODIUM SERPL-SCNC: 142 MMOL/L — SIGNIFICANT CHANGE UP (ref 135–145)
TROPONIN I, HIGH SENSITIVITY RESULT: 15.82 NG/L — SIGNIFICANT CHANGE UP
WBC # BLD: 5.7 K/UL — SIGNIFICANT CHANGE UP (ref 3.8–10.5)
WBC # FLD AUTO: 5.7 K/UL — SIGNIFICANT CHANGE UP (ref 3.8–10.5)

## 2022-07-25 PROCEDURE — 85610 PROTHROMBIN TIME: CPT

## 2022-07-25 PROCEDURE — 93005 ELECTROCARDIOGRAM TRACING: CPT

## 2022-07-25 PROCEDURE — 36415 COLL VENOUS BLD VENIPUNCTURE: CPT

## 2022-07-25 PROCEDURE — 84100 ASSAY OF PHOSPHORUS: CPT

## 2022-07-25 PROCEDURE — 99285 EMERGENCY DEPT VISIT HI MDM: CPT | Mod: 25

## 2022-07-25 PROCEDURE — 93280 PM DEVICE PROGR EVAL DUAL: CPT | Mod: 26

## 2022-07-25 PROCEDURE — 87635 SARS-COV-2 COVID-19 AMP PRB: CPT

## 2022-07-25 PROCEDURE — 93010 ELECTROCARDIOGRAM REPORT: CPT

## 2022-07-25 PROCEDURE — 83735 ASSAY OF MAGNESIUM: CPT

## 2022-07-25 PROCEDURE — 71045 X-RAY EXAM CHEST 1 VIEW: CPT

## 2022-07-25 PROCEDURE — 80053 COMPREHEN METABOLIC PANEL: CPT

## 2022-07-25 PROCEDURE — 71045 X-RAY EXAM CHEST 1 VIEW: CPT | Mod: 26

## 2022-07-25 PROCEDURE — 84484 ASSAY OF TROPONIN QUANT: CPT

## 2022-07-25 PROCEDURE — 85730 THROMBOPLASTIN TIME PARTIAL: CPT

## 2022-07-25 PROCEDURE — 85025 COMPLETE CBC W/AUTO DIFF WBC: CPT

## 2022-07-25 PROCEDURE — 99285 EMERGENCY DEPT VISIT HI MDM: CPT

## 2022-07-25 NOTE — ED PROVIDER NOTE - PATIENT PORTAL LINK FT
You can access the FollowMyHealth Patient Portal offered by [facility] by registering at the following website: [email or web address]. By joining [facility] FollowMyHealth portal, you will also be able to view your health information using other applications (apps) compatible with our system.

## 2022-07-25 NOTE — ED PROVIDER NOTE - CARDIAC, MLM
Normal rate, regular rhythm.  Heart sounds S1, S2.  No murmurs, rubs or gallops. pac Normal rate, regular rhythm.  Heart sounds S1, S2.  No murmurs, rubs or gallops. pacemaker left chest wall

## 2022-07-25 NOTE — PROCEDURE NOTE - ADDITIONAL PROCEDURE DETAILS
normal function dual chamber PPM  estimated battery longevity 5years  pt presented to  ER stating yellow light on home monitor alerted him to call his doctor, he instead proceeded to ER.  He denies any symptoms of chest pain, sob, dizziness or palpitations at present time.   Confirmed normal function of the ppm, and provided pt with phone number for remote [facility] Remote monitoring center as well as [facility] patient support number to troubleshoot remote transmitter from home.

## 2022-07-25 NOTE — ED PROVIDER NOTE - OBJECTIVE STATEMENT
89 y/o male with PMHx of Afib on Eliquis presents to ED with son for pacemaker machine light turning yellow. Received notification this morning from pacemaker telling him to go to MD. Pt spoke with family came to ED for evaluation. Pt's pacemaker placed by Dr. Temple. Stanley cp, n/v/d, sob. Nonsmoker no illegal drug use.  Pt and son unsure what type of pacemaker he has. 89 y/o male with PMHx of Afib on Eliquis presents to ED with son for pacemaker machine light turning yellow. Received notification this morning from pacemaker telling him to go to MD. Pt spoke with family came to ED for evaluation. Pt's pacemaker placed by Dr. Allred. Denies cp, n/v/d, sob. Nonsmoker no illegal drug use.  Pt and son unsure what type of pacemaker he has.

## 2022-07-25 NOTE — ED ADULT TRIAGE NOTE - CHIEF COMPLAINT QUOTE
Pt BIBEMS from home, A&O x4. hx of afib on Eliquis, metoprolol, amiodarone. received notification this morning from pacemaker telling him to go to MD. pt endorses L chest discomfort. denies palpitations. ekg in progress.

## 2022-07-25 NOTE — ED PROVIDER NOTE - PROGRESS NOTE DETAILS
Palmira Enriquez for attending Dr. Tuttle:  Son has picture. Pt has [place] [employer] pacemaker. Pt was +COVID 1 month ago. Palmira Enriquez for attending Dr. Tuttle:  Nicole from EP evaluated pt. Pacemaker working well. Pt can be discharged.

## 2022-07-25 NOTE — ED PROVIDER NOTE - NSFOLLOWUPINSTRUCTIONS_ED_ALL_ED_FT
Return to the Emergency Department for worsening or persistent symptoms, and/or ANY NEW OR CONCERNING SYMPTOMS. If you have issues obtaining follow up, please call: [phone] or [phone]  to obtain a doctor or specialist who takes your insurance in your area.      Pacemaker Implantation, Adult       Pacemaker implantation is a procedure to place a pacemaker inside the chest. A pacemaker is a small computer that sends electrical signals to the heart and helps the heart beat normally. A pacemaker also stores information about heart rhythms. You may need pacemaker implantation if you have:  •A slow heartbeat (bradycardia).      •Loss of consciousness that happens repeatedly (syncope) or repeated episodes of dizziness or light-headedness because of an irregular heart rate.      •Shortness of breath (dyspnea) due to heart problems.      The pacemaker usually attaches to your heart through a wire called a lead. One or two leads may be needed. There are different types of pacemakers:  •Transvenous pacemaker. This type is placed under the skin or muscle of your upper chest area. The lead goes through a vein in the chest area to reach the inside of the heart.      •Epicardial pacemaker. This type is placed under the skin or muscle of your chest or abdomen. The lead goes through your chest to the outside of the heart.        Tell a health care provider about:    •Any allergies you have.      •All medicines you are taking, including vitamins, herbs, eye drops, creams, and over-the-counter medicines.      •Any problems you or family members have had with anesthetic medicines.      •Any blood or bone disorders you have.      •Any surgeries you have had.      •Any medical conditions you have.      •Whether you are pregnant or may be pregnant.        What are the risks?    Generally, this is a safe procedure. However, problems may occur, including:  •Infection.      •Bleeding.      •Failure of the pacemaker or the lead.      •Collapse of a lung or bleeding into a lung.      •Blood clot inside a blood vessel with a lead.      •Damage to the heart.      •Infection inside the heart (endocarditis).      •Allergic reactions to medicines.        What happens before the procedure?      Staying hydrated      Follow instructions from your health care provider about hydration, which may include:  •Up to 2 hours before the procedure – you may continue to drink clear liquids, such as water, clear fruit juice, black coffee, and plain tea.      Eating and drinking restrictions     Follow instructions from your health care provider about eating and drinking, which may include:  •8 hours before the procedure – stop eating heavy meals or foods, such as meat, fried foods, or fatty foods.      •6 hours before the procedure – stop eating light meals or foods, such as toast or cereal.      •6 hours before the procedure – stop drinking milk or drinks that contain milk.      •2 hours before the procedure – stop drinking clear liquids.      Medicines    Ask your health care provider about:  •Changing or stopping your regular medicines. This is especially important if you are taking diabetes medicines or blood thinners.      •Taking medicines such as aspirin and ibuprofen. These medicines can thin your blood. Do not take these medicines unless your health care provider tells you to take them.      •Taking over-the-counter medicines, vitamins, herbs, and supplements.      Tests    You may have:•A heart evaluation. This may include:  •An electrocardiogram (ECG). This involves placing patches on your skin to check your heart rhythm.      •A chest X-ray.      •An echocardiogram. This is a test that uses sound waves (ultrasound) to produce an image of the heart.      •A cardiac rhythm monitor. This is used to record your heart rhythm and any events for a longer period of time.        •Blood tests.      •Genetic testing.      General instructions    •Do not use any products that contain nicotine or tobacco for at least 4 weeks before the procedure. These products include cigarettes, e-cigarettes, and chewing tobacco. If you need help quitting, ask your health care provider.    •Ask your health care provider:  •How your surgery site will be marked.    •What steps will be taken to help prevent infection. These steps may include:  •Removing hair at the surgery site.      •Washing skin with a germ-killing soap.      •Receiving antibiotic medicine.          •Plan to have someone take you home from the hospital or clinic.      •If you will be going home right after the procedure, plan to have someone with you for 24 hours.        What happens during the procedure?    •An IV will be inserted into one of your veins.    •You will be given one or more of the following:  •A medicine to help you relax (sedative).      •A medicine to numb the area (local anesthetic).      •A medicine to make you fall asleep (general anesthetic).      •The next steps vary depending on the type of pacemaker you will be getting.•If you are getting a transvenous pacemaker:  •An incision will be made in your upper chest.      •A pocket will be made for the pacemaker. It may be placed under the skin or between layers of muscle.      •The lead will be inserted into a blood vessel that goes to the heart.      •While X-rays are taken by an imaging machine (fluoroscopy), the lead will be advanced through the vein to the inside of your heart.      •The other end of the lead will be tunneled under the skin and attached to the pacemaker.      •If you are getting an epicardial pacemaker:  •An incision will be made near your ribs or breastbone (sternum) for the lead.      •The lead will be attached to the outside of your heart.      •Another incision will be made in your chest or upper abdomen to create a pocket for the pacemaker.      •The free end of the lead will be tunneled under the skin and attached to the pacemaker.          •The transvenous or epicardial pacemaker will be tested. Imaging studies may be done to check the lead position.      •The incisions will be closed with stitches (sutures), adhesive strips, or skin glue.      •Bandages (dressings) will be placed over the incisions.      The procedure may vary among health care providers and hospitals.      What happens after the procedure?    •Your blood pressure, heart rate, breathing rate, and blood oxygen level will be monitored until you leave the hospital or clinic.      •You may be given antibiotics.      •You will be given pain medicine.      •An ECG and chest X-rays will be done.      •You may need to wear a continuous type of ECG (Holter monitor) to check your heart rhythm.      •Your health care provider will program the pacemaker.      •If you were given a sedative during the procedure, it can affect you for several hours. Do not drive or operate machinery until your health care provider says that it is safe.      •You will be given a pacemaker identification card. This card lists the implant date, device model, and  of your pacemaker.        Summary    •A pacemaker is a small computer that sends electrical signals to the heart and helps the heart beat normally.      •There are different types of pacemakers. A pacemaker may be placed under the skin or muscle of your chest or abdomen.      •Follow instructions from your health care provider about eating and drinking and about taking medicines before the procedure.      This information is not intended to replace advice given to you by your health care provider. Make sure you discuss any questions you have with your health care provider.

## 2022-07-25 NOTE — PROCEDURE NOTE - INTERROGATION NOTE: COMMENTS
pacer dependent at VVI30 (unchanged from last year), occasional episodes of PAF, most recent 6/10/22 lasting 30min

## 2022-08-16 NOTE — ASSESSMENT
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

## 2022-09-01 ENCOUNTER — APPOINTMENT (OUTPATIENT)
Dept: FAMILY MEDICINE | Facility: CLINIC | Age: 87
End: 2022-09-01

## 2022-09-06 ENCOUNTER — RX RENEWAL (OUTPATIENT)
Age: 87
End: 2022-09-06

## 2022-09-13 NOTE — ED ADULT TRIAGE NOTE - BP NONINVASIVE DIASTOLIC (MM HG)
[facility]          Inflammatory Bowel Disease Follow Up Note         TODAY'S VISIT DATE:  9/13/2022    Reason for Consult:    Chief Complaint   Patient presents with    Crohn's Disease       PCP: Neeru Hinkle      Referring MD:   No ref. provider found    History of Present Illness:  Celine Sinclair who is a 71 y.o. female is being seen today at the [facility] on 09/13/2022 for inflammatory bowel disease- Crohn's disease.  This my 1st time seeing her in about 2 years.  At that time she had a colonoscopy done that showed no signs of active inflammation in her colon.  Recently she went for a screening colonoscopy with Dr. Luke and was noted to have inflammation throughout her entire colon.  There was no issues with her anastomosis and no mention of ileal inflammation.  Biopsies were not done because she was taking anticoagulation and never held it for the procedure.  She reports that over the last few months she has been having an increase in diarrhea as well as some blood in the stools.  She denies any abdominal pain, nausea, vomiting.  She is currently undergoing evaluation for a possible anti phospholipid antibody syndrome diagnosis.  She was also seen in the emergency room recently for some arm pain was noted to have an elevated bilirubin.  She has a history of a prior cholecystectomy.    IBD History:  Diagnosed with Crohn's disease in the 1990s.  The details of her diagnosis in history are unclear and records from back then are not available.  She thinks she was diagnosed around 1990 or possibly even in the late 1980s.  She was having severe abdominal pain, body pains, nausea, vomiting, and diarrhea.  She was diagnosed with Crohn's disease and reports undergoing surgery for a bowel obstruction shortly after the initial diagnosis.  After surgery she was treated with Remicade for an un clear amount of time.  She was not having any  symptoms at the time so she thinks the Remicade may have worked.  She does not recall why it was stopped.  In 2002 she underwent a hysterectomy that was complicated by an enterotomy that was repaired during surgery.  Lysis of extensive adhesions led to multiple enterotomies that required repair.  During that hospitalization she developed an abscess because of a leak from 1 of the enterotomies.  She eventually had an ileostomy that was subsequently taken down about 6 months later.  She has had chronic diarrhea since the time of her initial surgery.  She reports having about 3-4 bowel movements every day that are the consistency of applesauce.  She typically only has bowel movements after meals.  She denies any baseline abdominal pain, nausea, or vomiting.  She denies any recent weight loss.  She had a capsule endoscopy in 2013 that was normal.  A colonoscopy from March of 2016 revealed an ileocolonic anastomosis but no evidence of inflammatory disease.  She had an EGD and colonoscopy in December of 2017 but I do not have the reports from these.  A subsequent colonoscopy in October of 2020 showed no evidence of active disease.  She did not follow up after that but presented for a screening colonoscopy in August 2022 with Dr. Luke was noted to have active inflammation throughout her entire colon.  There was no mention of ileal inflammation.  Biopsies were not done because she was taking anticoagulation.    IBD Details:  Dx Date:  Early 1990s  Disease type/distribution:  Crohn's disease/colon inflammation on most recent colonoscopy  Current Treatment:  None  Start Date:  Response:   Optimized:   Adverse reactions:   Prior surgeries:  Ileocolonic resection  CRP Elevation:  Unknown  Disease Complications:  Stricture formation requiring surgery  Extraintestinal manifestations:  Possible joint pain  Prior treatments:   Steroids:  Unclear  5ASA:  Unclear  IMM:  None  TNF Inh:  Treated with Remicade in the past, unclear  response   Anti-Integrin:  None   IL 12/23:  None  IJEOMA Inh:  None    Previous Clinical Trials:  None    Last Colonoscopy:   August 2022-inflammation throughout the colon, no mention of ileal inflammation, no biopsies done  October 2020-no active inflammation in the ileum or colon  2017-report not available    Other Endoscopies:  Colonoscopy in 2016 with an ileocolonic anastomosis, no active inflammation.  Capsule endoscopy from 2013 with no small-bowel inflammation    Imaging:   MRE:  None   CT:  CT from earlier this year with colonic inflammatory changes as well as a lesion in the liver   Other:  None    Pertinent Labs:  Lab Results   Component Value Date    SEDRATE 8 06/18/2020    CRP 3.9 09/13/2022     No results found for: TTGIGA, IGA  Lab Results   Component Value Date    TSH 2.429 02/01/2022    FREET4 0.85 06/18/2020     Lab Results   Component Value Date    CHOOSCQX47GR 18 (L) 09/13/2022    LSNRYDCP93 212 09/13/2022     Lab Results   Component Value Date    HEPBSAG Non-reactive 09/13/2022    HEPBCAB Non-reactive 09/13/2022    HEPCAB Non-reactive 09/13/2022     Lab Results   Component Value Date    UTC33SEWK Negative 09/25/2020     Lab Results   Component Value Date    NIL 0.060 09/25/2020    MITOGENNIL 8.000 09/25/2020     Lab Results   Component Value Date    TPTMINTERP SEE BELOW 09/25/2020     Lab Results   Component Value Date    CDIFFICILEAN Negative 09/28/2020    CDIFFTOX Negative 09/28/2020     Lab Results   Component Value Date    CALPROTECTIN 40.0 09/28/2020       Therapeutic Drug Monitoring Labs:  No results found for: PROMETH  No results found for: ANSADAINIT, INFLIXIMAB, INFLIXINTERP    Vaccinations:  Lab Results   Component Value Date    HEPBSAB Negative 09/25/2020     Lab Results   Component Value Date    HEPAIGG Reactive 09/13/2022     Lab Results   Component Value Date    VARICELLAZOS 0.76 09/25/2020    VARICELLAINT Negative 09/25/2020     Immunization History   Administered Date(s)  60 Administered    COVID-19, MRNA, LN-S, PF (Pfizer) (Gray Cap) 06/02/2022    COVID-19, MRNA, LN-S, PF (Pfizer) (Purple Cap) 02/11/2021, 03/04/2021, 09/20/2021    Influenza (FLUAD) - Quadrivalent - Adjuvanted - PF *Preferred* (65+) 12/17/2021    Influenza - Quadrivalent - High Dose - PF (65 years and older) 10/01/2020    Pneumococcal Conjugate - 13 Valent 10/03/2019    Pneumococcal Polysaccharide - 23 Valent 11/06/2020         Review of Systems  Review of Systems   Constitutional:  Negative for chills, fever and weight loss.   HENT:  Negative for sore throat.    Eyes:  Negative for pain, discharge and redness.   Respiratory:  Negative for cough, shortness of breath and wheezing.    Cardiovascular:  Negative for chest pain, orthopnea and leg swelling.   Gastrointestinal:  Positive for blood in stool and diarrhea. Negative for abdominal pain, constipation, heartburn, melena, nausea and vomiting.   Genitourinary:  Negative for dysuria, frequency and urgency.   Musculoskeletal:  Positive for back pain. Negative for joint pain and myalgias.   Skin:  Negative for itching and rash.   Neurological:  Negative for focal weakness and seizures.   Endo/Heme/Allergies:  Does not bruise/bleed easily.   Psychiatric/Behavioral:  Negative for depression. The patient is not nervous/anxious.      Medical History:   Past Medical History:   Diagnosis Date    Aortic atherosclerosis     Atrial fibrillation     Benign essential hypertension     Cataract     Senile    Crohn's colitis     Depression, major, recurrent, moderate     Fibromyalgia     GERD (gastroesophageal reflux disease)     Hypertensive cardiomyopathy     IBS (irritable bowel syndrome)     Migraine     Opioid abuse, in remission     Osteopenia     Paget disease of bone     Port-A-Cath in place     right chest    Prolonged QT interval     RA (rheumatoid arthritis)     Sedative hypnotic or anxiolytic dependence     in remission        Surgical History:  Past Surgical History:    Procedure Laterality Date    CATARACT EXTRACTION W/  INTRAOCULAR LENS IMPLANT Left 02/21/2017    Dr. Christianson    CATARACT EXTRACTION W/  INTRAOCULAR LENS IMPLANT Right 03/07/2017    Dr. Christianson    CHOLECYSTECTOMY      COLON SURGERY      COLONOSCOPY N/A 3/16/2016    Procedure: COLONOSCOPY;  Surgeon: Dale Trejo MD;  Location: [facility] (4TH FLR);  Service: Endoscopy;  Laterality: N/A;    COLONOSCOPY N/A 10/12/2020    Procedure: COLONOSCOPY;  Surgeon: Cali Urena MD;  Location: [facility] (4TH FLR);  Service: Endoscopy;  Laterality: N/A;  covid test 10/9-[facility]- completed 10/9/20  ok to hold Coumadin x 5 days per coumadin clinic-see telephone encounterd ated 10/6-MS / Loop recorder  10/6/20- Pt confirmed- ERW@1122  10/9-Pt confirmed earlier arrival time, prep ins. reviewed and emailed to Pt    COLONOSCOPY N/A 8/25/2022    Procedure: COLONOSCOPY;  Surgeon: Lewis Luke MD;  Location: [facility];  Service: Endoscopy;  Laterality: N/A;    Colostomy reversal      HEMORRHOID SURGERY      HYSTERECTOMY      ILEOSTOMY      ILEOSTOMY CLOSURE      LEFT HEART CATHETERIZATION N/A 2/15/2019    Procedure: HEART CATH-LEFT;  Surgeon: Miky Keita MD;  Location: [facility] CATH LAB;  Service: Cardiology;  Laterality: N/A;    NECK SURGERY      OOPHORECTOMY Bilateral     SBO      SMALL INTESTINE SURGERY      TONSILLECTOMY      TUBAL LIGATION         Family History:   Family History   Problem Relation Age of Onset    Glaucoma Paternal Grandmother     Other Daughter         Diabetic Retinopathy    Breast cancer Daughter 40    Retinal detachment Grandchild     Heart attack Maternal Grandmother     Colon cancer Maternal Grandmother     Cancer Mother         Unknown type    Emphysema Father 67    Heart disease Father     Lung cancer Sister     Heart attack Sister     Breast cancer Daughter 47    Breast cancer Sister     Amblyopia Neg Hx     Blindness Neg Hx     Strabismus Neg Hx     Macular degeneration Neg Hx      Cataracts Neg Hx        Social History:   Social History     Tobacco Use    Smoking status: Never    Smokeless tobacco: Never   Substance Use Topics    Drug use: No       Allergies: Reviewed    Home Medications:   Medication List with Changes/Refills   New Medications    MESALAMINE (LIALDA) 1.2 GRAM TBEC    Take 2 tablets (2.4 g total) by mouth daily with breakfast.   Current Medications    ACETAMINOPHEN (TYLENOL) 500 MG TABLET    Take 1 tablet (500 mg total) by mouth daily as needed for Pain.    AMIODARONE (PACERONE) 200 MG TAB    Take 1 tablet (200 mg total) by mouth once daily.    APIXABAN (ELIQUIS) 5 MG TAB    Take 1 tablet (5 mg total) by mouth 2 (two) times daily.    ASPIRIN (ECOTRIN) 81 MG EC TABLET    Take 1 tablet (81 mg total) by mouth once daily.    ATORVASTATIN (LIPITOR) 40 MG TABLET        DULOXETINE (CYMBALTA) 30 MG CAPSULE    Take 1 capsule (30 mg total) by mouth once daily.    GABAPENTIN (NEURONTIN) 100 MG CAPSULE    Take 1 capsule (100 mg total) by mouth 3 (three) times daily.    METOPROLOL SUCCINATE (TOPROL-XL) 50 MG 24 HR TABLET    TAKE 1 TABLET BY MOUTH TWICE A DAY    PANTOPRAZOLE (PROTONIX) 40 MG TABLET    Take 1 tablet by mouth once daily at 6am.    POTASSIUM CHLORIDE SA (K-DUR,KLOR-CON) 20 MEQ TABLET    Take 2 tablets (40 mEq total) by mouth once daily.       Physical Exam:  Vital Signs:  /84 (BP Location: Left arm, Patient Position: Sitting)   Pulse 69   Temp 98.2 °F (36.8 °C)   Wt 82 kg (180 lb 12.4 oz)   SpO2 100%   BMI 30.08 kg/m²   Body mass index is 30.08 kg/m².    Physical Exam  Vitals and nursing note reviewed.   Constitutional:       General: She is not in acute distress.     Appearance: Normal appearance. She is well-developed. She is not ill-appearing or toxic-appearing.   Eyes:      Conjunctiva/sclera: Conjunctivae normal.      Pupils: Pupils are equal, round, and reactive to light.   Neck:      Thyroid: No thyromegaly.   Cardiovascular:      Rate and Rhythm: Normal rate  and regular rhythm.      Heart sounds: Normal heart sounds. No murmur heard.  Pulmonary:      Effort: Pulmonary effort is normal.      Breath sounds: Normal breath sounds. No wheezing or rales.   Abdominal:      General: Bowel sounds are normal. There is no distension.      Palpations: Abdomen is soft. There is no mass.      Tenderness: There is no abdominal tenderness.   Musculoskeletal:         General: No tenderness. Normal range of motion.   Lymphadenopathy:      Cervical: No cervical adenopathy.   Skin:     Findings: No erythema or rash.   Neurological:      General: No focal deficit present.      Mental Status: She is alert and oriented to person, place, and time.   Psychiatric:         Mood and Affect: Mood normal.         Behavior: Behavior normal.         Thought Content: Thought content normal.         Judgment: Judgment normal.       Labs: reviewed and pertinent noted above    Assessment/Plan:  Celine Sinclair is a 71 y.o. female with a history of Crohn's disease status post surgical resection many years ago who has not had any clear evidence of active disease but now having 1 week of worsening diarrhea, abdominal pain, nausea, and vomiting. The following issues were addresssed:    1. Crohn's disease of small and large intestines with complication    2. Elevated bilirubin    3. Liver lesion      1.  Crohn's colitis:  Her recent colonoscopy showed some mild active colitis in her colon.  No ileal involvement was noted.  Recommend that we check a baseline stool calprotectin level and start Lialda 2.4 g daily.  We will update other baseline labs as well.    2.  Diarrhea:  Her stool frequency currently is pretty similar to what it was 2 years ago whenever she had a normal colonoscopy.  It is possible some of these loose bowel movements could be related to postsurgical changes.    3.  Elevated bilirubin: Check haptoglobin, LDH, total and direct bilirubin.  Could be Gilbert's syndrome.  Could also be related to  hemolysis.    4. Liver lesion: We will plan to follow this up in the near future with further imaging.  Will likely need appointment with hepatology for further evaluation.     # IBD specific health maintenance:  Colon cancer surveillance:  Up-to-date    Annual:  - Eye exam:  Not applicable  - Skin exam (if on IMM/TNF):  Not applicable  - reminded pt to use sunblock/hats/sunprotective clothing  - PAP (if immunosuppressed):  Status post hysterectomy    DEXA:  Done previous    Vitamin D:  Ordered today    Vaccines:    Influenza:  Will order the future   Pneumovax:     PCV13:  Completed October 2019    PSV23:  2020   HAV:  Check serology   HBV:  Check serology   Tdap:  Review in the future   MMR:  Review in the future   VZV:  Check serology   HZV:  Received 1st dose at some point   HPV:  Not applicable   Meningococcus:  Not applicable   COVID: Completed 4 doses    Follow up: Follow up in about 4 months (around 1/13/2023).      Thank you again for sending Celine Sinclair to see Dr. Ziyad Urena today at the [facility]. Please don't hesitate to contact Dr. Urena if there are any questions regarding this evaluation, or if you have any other patients with inflammatory bowel disease for whom you would like a consultation. You can reach Dr. Urena at [phone] or by email at [email or web address]    Cali Urena MD  Department of Gastroenterology  Inflammatory Bowel Disease

## 2022-09-20 ENCOUNTER — APPOINTMENT (OUTPATIENT)
Dept: FAMILY MEDICINE | Facility: CLINIC | Age: 87
End: 2022-09-20

## 2022-09-20 VITALS
TEMPERATURE: 97.9 F | BODY MASS INDEX: 25.71 KG/M2 | RESPIRATION RATE: 16 BRPM | HEART RATE: 60 BPM | HEIGHT: 66 IN | OXYGEN SATURATION: 96 % | SYSTOLIC BLOOD PRESSURE: 130 MMHG | DIASTOLIC BLOOD PRESSURE: 60 MMHG | WEIGHT: 160 LBS

## 2022-09-20 PROCEDURE — 99214 OFFICE O/P EST MOD 30 MIN: CPT | Mod: 25

## 2022-09-20 PROCEDURE — 90662 IIV NO PRSV INCREASED AG IM: CPT

## 2022-09-20 PROCEDURE — G0008: CPT

## 2022-09-20 RX ORDER — FERROUS SULFATE TAB 325 MG (65 MG ELEMENTAL FE) 325 (65 FE) MG
325 (65 FE) TAB ORAL DAILY
Qty: 90 | Refills: 0 | Status: ACTIVE | COMMUNITY
Start: 2020-06-17

## 2022-09-20 NOTE — PHYSICAL EXAM
[Normal Sclera/Conjunctiva] : normal sclera/conjunctiva [Supple] : supple [Normal Rate] : normal rate  [Normal S1, S2] : normal S1 and S2 [Pedal Pulses Present] : the pedal pulses are present [No Edema] : there was no peripheral edema [No CVA Tenderness] : no CVA  tenderness [No Spinal Tenderness] : no spinal tenderness [No Rash] : no rash [Normal] : affect was normal and insight and judgment were intact [de-identified] : irregular

## 2022-09-20 NOTE — ASSESSMENT
[FreeTextEntry1] : CKD\par Patient seen nephrology Dr Smiley\par Labs in chart\par Allopurinol and Lasix are on hold.\par Patient is doing well since discharge from hospital.\par \par Hypertension:\par Blood pressure okay, On metoprolol succinate 25 mg.\par \par Atrial fibrillation:\par Seen by cardiology 7/22\par on metoprolol and Eliquis 2.5 mg twice daily.\par \par Follow up in 3 months

## 2022-09-20 NOTE — HISTORY OF PRESENT ILLNESS
[FreeTextEntry1] : Follow up [de-identified] : Mr. TAVIA RAYMUNDO is a 89 year old male presenting for a follow up\par Seen by Nephrologist Dr Smiley.\par Improved.\par HLD\par On atorvastatin, wants Rx renewed.\par HTN BP stable.\par On  Metoprolol. No complaints of dizziness.\par Off Amlodipine for dizziness, BP was low.\par A1C elevated, diet is poor. Eats icecream daily.\par No complaints.\par Seen by cardiologist Dr Marquez 7/22\par Dermatology follow up for Skin Ca\par \par A Fib on Eliquis. Amidarone. Dr Allred. on Metoprolol\par \par Nocturia goes 3 times at night. On Tamsulosin\par

## 2022-09-26 ENCOUNTER — APPOINTMENT (OUTPATIENT)
Dept: ELECTROPHYSIOLOGY | Facility: CLINIC | Age: 87
End: 2022-09-26

## 2022-09-27 ENCOUNTER — NON-APPOINTMENT (OUTPATIENT)
Age: 87
End: 2022-09-27

## 2022-09-27 PROCEDURE — 93294 REM INTERROG EVL PM/LDLS PM: CPT

## 2022-09-27 PROCEDURE — 93296 REM INTERROG EVL PM/IDS: CPT

## 2022-10-18 ENCOUNTER — APPOINTMENT (OUTPATIENT)
Dept: DERMATOLOGY | Facility: CLINIC | Age: 87
End: 2022-10-18

## 2022-10-18 DIAGNOSIS — L82.1 OTHER SEBORRHEIC KERATOSIS: ICD-10-CM

## 2022-10-18 DIAGNOSIS — D18.01 HEMANGIOMA OF SKIN AND SUBCUTANEOUS TISSUE: ICD-10-CM

## 2022-10-18 DIAGNOSIS — L81.4 OTHER MELANIN HYPERPIGMENTATION: ICD-10-CM

## 2022-10-18 PROCEDURE — 99213 OFFICE O/P EST LOW 20 MIN: CPT

## 2022-10-18 RX ORDER — CHOLECALCIFEROL (VITAMIN D3) 25 MCG
TABLET ORAL
Refills: 0 | Status: ACTIVE | COMMUNITY

## 2022-10-18 NOTE — ASSESSMENT
[FreeTextEntry1] : Benign SKs/angiomas; \par \par Therapeutic options and their risks and benefits; along with multiple diagnostic possibilities were discussed at length; risks and benefits of further study were discussed;\par \par no tx needed;\par f/u annually prn

## 2022-10-25 ENCOUNTER — APPOINTMENT (OUTPATIENT)
Dept: NEPHROLOGY | Facility: CLINIC | Age: 87
End: 2022-10-25

## 2022-10-25 VITALS
HEART RATE: 64 BPM | DIASTOLIC BLOOD PRESSURE: 72 MMHG | BODY MASS INDEX: 25.71 KG/M2 | SYSTOLIC BLOOD PRESSURE: 130 MMHG | WEIGHT: 160 LBS | HEIGHT: 66 IN | OXYGEN SATURATION: 98 %

## 2022-10-25 PROCEDURE — 36415 COLL VENOUS BLD VENIPUNCTURE: CPT

## 2022-10-25 PROCEDURE — 99213 OFFICE O/P EST LOW 20 MIN: CPT | Mod: 25

## 2022-11-01 ENCOUNTER — NON-APPOINTMENT (OUTPATIENT)
Age: 87
End: 2022-11-01

## 2022-11-01 LAB
ALBUMIN SERPL ELPH-MCNC: 4.2 G/DL
ALP BLD-CCNC: 120 U/L
ALT SERPL-CCNC: 36 U/L
ANION GAP SERPL CALC-SCNC: 13 MMOL/L
APPEARANCE: CLEAR
AST SERPL-CCNC: 33 U/L
BACTERIA: NEGATIVE
BASOPHILS # BLD AUTO: 0.04 K/UL
BASOPHILS NFR BLD AUTO: 0.7 %
BILIRUB SERPL-MCNC: 0.7 MG/DL
BILIRUBIN URINE: NEGATIVE
BLOOD URINE: ABNORMAL
BUN SERPL-MCNC: 27 MG/DL
CALCIUM SERPL-MCNC: 9.2 MG/DL
CALCIUM SERPL-MCNC: 9.2 MG/DL
CHLORIDE SERPL-SCNC: 105 MMOL/L
CO2 SERPL-SCNC: 22 MMOL/L
COLOR: NORMAL
CREAT SERPL-MCNC: 1.84 MG/DL
EGFR: 34 ML/MIN/1.73M2
EOSINOPHIL # BLD AUTO: 0.07 K/UL
EOSINOPHIL NFR BLD AUTO: 1.3 %
GLUCOSE QUALITATIVE U: NEGATIVE
GLUCOSE SERPL-MCNC: 98 MG/DL
HCT VFR BLD CALC: 36.1 %
HGB BLD-MCNC: 11.7 G/DL
HYALINE CASTS: 0 /LPF
IMM GRANULOCYTES NFR BLD AUTO: 0.5 %
KETONES URINE: NEGATIVE
LEUKOCYTE ESTERASE URINE: ABNORMAL
LYMPHOCYTES # BLD AUTO: 0.83 K/UL
LYMPHOCYTES NFR BLD AUTO: 15 %
MAN DIFF?: NORMAL
MCHC RBC-ENTMCNC: 31.7 PG
MCHC RBC-ENTMCNC: 32.4 GM/DL
MCV RBC AUTO: 97.8 FL
MICROSCOPIC-UA: NORMAL
MONOCYTES # BLD AUTO: 0.45 K/UL
MONOCYTES NFR BLD AUTO: 8.1 %
NEUTROPHILS # BLD AUTO: 4.12 K/UL
NEUTROPHILS NFR BLD AUTO: 74.4 %
NITRITE URINE: NEGATIVE
PARATHYROID HORMONE INTACT: 65 PG/ML
PH URINE: 6
PLATELET # BLD AUTO: 178 K/UL
POTASSIUM SERPL-SCNC: 4.9 MMOL/L
PROT SERPL-MCNC: 6.4 G/DL
PROTEIN URINE: NORMAL
RBC # BLD: 3.69 M/UL
RBC # FLD: 15.5 %
RED BLOOD CELLS URINE: 2 /HPF
SODIUM SERPL-SCNC: 141 MMOL/L
SPECIFIC GRAVITY URINE: 1.01
SQUAMOUS EPITHELIAL CELLS: 0 /HPF
UROBILINOGEN URINE: NORMAL
WBC # FLD AUTO: 5.54 K/UL
WHITE BLOOD CELLS URINE: 22 /HPF

## 2022-11-01 NOTE — HISTORY OF PRESENT ILLNESS
[___ Month(s) Ago] : [unfilled] month(s) ago [Ordering Test(s) ___] : ordering [unfilled] [Hypertensive Nephropathy] : hypertensive nephropathy [None] : ~he/she~ is currently asymptomatic [Diuretics] : diuretics [Antihypertensives] : antihypertensives [Vitamin D] : vitamin D [Iron Supplement] : iron supplement [Good Compliance] : good compliance  [Good Tolerance] : good tolerance  [Good Symptom Control] : good symptom control [FreeTextEntry1] : Patient is an 89 year old man with past medical history of BPH, HTN, Gout, Pre-Diabetic, benign R sided renal mass s/p partial nephrectomy in 2006, here for follow up of CKD.\par \par Today, \par Patient reports no health related adverse event since last clinic visit. \par NO ER/Hospitalization/urgent care visit. \par Denies any cardiac or urinary complaints\par No dysuria, gross hematuria, SOB, LUTS.\par Reports BP has been well controlled. \par \par Current: Pt seen/examined; no complaints;\par

## 2022-11-01 NOTE — PHYSICAL EXAM
[General Appearance - Alert] : alert [General Appearance - In No Acute Distress] : in no acute distress [General Appearance - Well Nourished] : well nourished [General Appearance - Well Developed] : well developed [General Appearance - Well-Appearing] : healthy appearing [Sclera] : the sclera and conjunctiva were normal [Strabismus] : no strabismus was seen [Outer Ear] : the ears and nose were normal in appearance [Hearing Threshold Finger Rub Not [place]] : hearing was normal [Neck Appearance] : the appearance of the neck was normal [Neck Cervical Mass (___cm)] : no neck mass was observed [Jugular Venous Distention Increased] : there was no jugular-venous distention [Respiration, Rhythm And Depth] : normal respiratory rhythm and effort [Exaggerated Use Of Accessory Muscles For Inspiration] : no accessory muscle use [Auscultation Breath Sounds / Voice Sounds] : lungs were clear to auscultation bilaterally [Apical Impulse] : the apical impulse was normal [Heart Rate And Rhythm] : heart rate was normal and rhythm regular [Heart Sounds] : normal S1 and S2 [Edema] : there was no peripheral edema [Abdomen Soft] : soft [Abdomen Tenderness] : non-tender [Cervical Lymph Nodes Enlarged Anterior Bilaterally] : anterior cervical [Supraclavicular Lymph Nodes Enlarged Bilaterally] : supraclavicular [No CVA Tenderness] : no ~M costovertebral angle tenderness [Abnormal Walk] : normal gait [Involuntary Movements] : no involuntary movements were seen [Skin Color & Pigmentation] : normal skin color and pigmentation [Skin Turgor] : normal skin turgor [] : no rash [Skin Lesions] : no skin lesions [No Focal Deficits] : no focal deficits [Oriented To Time, Place, And Person] : oriented to person, place, and time [Impaired Insight] : insight and judgment were intact [Affect] : the affect was normal [Mood] : the mood was normal [FreeTextEntry1] : Deferred

## 2022-11-01 NOTE — ASSESSMENT
[FreeTextEntry1] : 1) CKD III in the setting of long standing HTN, partial nephrectomy\par Scr stable; lytes wnl. Scr 1.84\par kidney function has improved to 1.8-2 range\par UACR <30\par BMD labs WNL\par \par 2) HTN/ Volume\par Bp stable; pt euvolemic on exam\par Pt off lasix; repeating labs today\par \par 3) Anemia of CKD with superimposed iron def\par Hb at goal- continue po iron\par \par 4) BPH\par Continue Flomax\par \par Repeat labs done in office today\par \par RTC in 4-6mo

## 2022-11-04 LAB
CREAT SPEC-SCNC: 75 MG/DL
MICROALBUMIN 24H UR DL<=1MG/L-MCNC: 2.2 MG/DL
MICROALBUMIN/CREAT 24H UR-RTO: 29 MG/G

## 2022-11-09 ENCOUNTER — APPOINTMENT (OUTPATIENT)
Dept: NEPHROLOGY | Facility: CLINIC | Age: 87
End: 2022-11-09

## 2022-11-21 ENCOUNTER — FORM ENCOUNTER (OUTPATIENT)
Age: 87
End: 2022-11-21

## 2022-11-21 ENCOUNTER — APPOINTMENT (OUTPATIENT)
Dept: INTERNAL MEDICINE | Facility: CLINIC | Age: 87
End: 2022-11-21

## 2022-11-21 VITALS
WEIGHT: 160 LBS | BODY MASS INDEX: 25.71 KG/M2 | HEIGHT: 66 IN | OXYGEN SATURATION: 95 % | TEMPERATURE: 98.1 F | RESPIRATION RATE: 16 BRPM | SYSTOLIC BLOOD PRESSURE: 160 MMHG | DIASTOLIC BLOOD PRESSURE: 70 MMHG | HEART RATE: 59 BPM

## 2022-11-21 DIAGNOSIS — J20.9 ACUTE BRONCHITIS, UNSPECIFIED: ICD-10-CM

## 2022-11-21 DIAGNOSIS — R05.9 COUGH, UNSPECIFIED: ICD-10-CM

## 2022-11-21 DIAGNOSIS — Z87.09 PERSONAL HISTORY OF OTHER DISEASES OF THE RESPIRATORY SYSTEM: ICD-10-CM

## 2022-11-21 PROCEDURE — 99214 OFFICE O/P EST MOD 30 MIN: CPT

## 2022-11-23 DIAGNOSIS — J90 PLEURAL EFFUSION, NOT ELSEWHERE CLASSIFIED: ICD-10-CM

## 2022-11-23 LAB
RAPID RVP RESULT: DETECTED
RV+EV RNA SPEC QL NAA+PROBE: DETECTED
SARS-COV-2 RNA PNL RESP NAA+PROBE: NOT DETECTED

## 2022-11-25 PROBLEM — R05.9 COUGH: Status: ACTIVE | Noted: 2022-11-25

## 2022-11-25 PROBLEM — Z87.09 HISTORY OF ACUTE BRONCHITIS: Status: RESOLVED | Noted: 2022-11-21 | Resolved: 2022-11-21

## 2022-11-25 PROBLEM — J20.9 ACUTE BRONCHITIS: Status: RESOLVED | Noted: 2022-11-25 | Resolved: 2022-12-25

## 2022-11-25 PROBLEM — Z87.09 HISTORY OF ACUTE BRONCHITIS: Status: RESOLVED | Noted: 2022-11-21 | Resolved: 2022-11-25

## 2022-11-25 NOTE — ASSESSMENT
[FreeTextEntry1] : \par Cough/chest congestion likely due to acute bronchitis\par -I will check RVP/COVID-19 PCR testing-collected in office today\par -I have advised chest x-ray\par -I will treat with doxycycline 100 mg twice daily x7 days  (R/B/A/side effects discussed including pill esophagitis and photosensitivity)\par -He is to notify office if symptoms persist or worsen\par \par Atrial fibrillation/PPM\par -He denies any cardiac complaints today\par -He is followed by cardiology\par -He is on amiodarone, Eliquis, metoprolol\par \par CKD\par -followed by nephrology\par -last creatinine was 1.84 10/2022\par \par HTN:\par -BP is mildly elevated today but he states it is high because he is sick today\par -His recent blood pressure readings have been much better\par -He should continue metoprolol ER 50 mg daily\par \par BPH:\par -on flomax\par \par Anemia\par -Last hemoglobin was 11.7 which is stable\par \par Pre-DM\par -HgA1c 6.0 7/2022\par \par HCM:\par \par Flu shot: 9/2022\par \par Prevnar 13: 2015\par \par Pneumovax: 2020\par \par COVID-vaccine: Pfizer x3\par \par He has follow-up appointment scheduled with Dr. Suggs 1/2023

## 2022-11-25 NOTE — REVIEW OF SYSTEMS
[Nasal Discharge] : nasal discharge [Cough] : cough [Negative] : Neurological [Fever] : no fever [Chills] : no chills [Fatigue] : no fatigue [Earache] : no earache [Sore Throat] : no sore throat [Chest Pain] : no chest pain [Palpitations] : no palpitations [Lower Ext Edema] : no lower extremity edema [Shortness Of Breath] : no shortness of breath [Wheezing] : no wheezing [Dyspnea on Exertion] : not dyspnea on exertion [Abdominal Pain] : no abdominal pain [Nausea] : no nausea [Diarrhea] : no diarrhea [Vomiting] : no vomiting

## 2022-11-25 NOTE — PHYSICAL EXAM
[No Acute Distress] : no acute distress [Well-Appearing] : well-appearing [Normal Voice/Communication] : normal voice/communication [Normal Sclera/Conjunctiva] : normal sclera/conjunctiva [PERRL] : pupils equal round and reactive to light [Normal Outer Ear/Nose] : the outer ears and nose were normal in appearance [Normal Oropharynx] : the oropharynx was normal [Normal TMs] : both tympanic membranes were normal [Normal Nasal Mucosa] : the nasal mucosa was normal [No JVD] : no jugular venous distention [No Lymphadenopathy] : no lymphadenopathy [Supple] : supple [Thyroid Normal, No Nodules] : the thyroid was normal and there were no nodules present [No Respiratory Distress] : no respiratory distress  [No Accessory Muscle Use] : no accessory muscle use [Clear to Auscultation] : lungs were clear to auscultation bilaterally [Normal Rate] : normal rate  [Regular Rhythm] : with a regular rhythm [Normal S1, S2] : normal S1 and S2 [No Murmur] : no murmur heard [No Edema] : there was no peripheral edema [Soft] : abdomen soft [Non Tender] : non-tender [Non-distended] : non-distended [No Masses] : no abdominal mass palpated [No HSM] : no HSM [Normal Bowel Sounds] : normal bowel sounds [No Rash] : no rash [Normal Affect] : the affect was normal [Alert and Oriented x3] : oriented to person, place, and time [Normal Mood] : the mood was normal [Normal Insight/Judgement] : insight and judgment were intact [de-identified] : No calf tenderness

## 2022-11-25 NOTE — HISTORY OF PRESENT ILLNESS
[FreeTextEntry8] : This is a patient of Dr Suggs here today for acute visit\par \par He is here today with his son\par \par He is here today for evaluation of 1 week history of cough with some mild sputum production.  He states his chest feels a little congested.  He denies any fever/chills.  He reports mild runny nose.  He denies ear pain.  He denies sore throat.  He denies any chest pain, shortness of breath, wheezing.  He reports he does have history of postnasal drip.  He denies any sick contacts.  He states he has not taken any home COVID-19 tests.

## 2022-12-04 ENCOUNTER — FORM ENCOUNTER (OUTPATIENT)
Age: 87
End: 2022-12-04

## 2022-12-05 ENCOUNTER — OFFICE (OUTPATIENT)
Dept: URBAN - METROPOLITAN AREA CLINIC 12 | Facility: CLINIC | Age: 87
Setting detail: OPHTHALMOLOGY
End: 2022-12-05
Payer: MEDICARE

## 2022-12-05 DIAGNOSIS — H40.013: ICD-10-CM

## 2022-12-05 DIAGNOSIS — H35.3131: ICD-10-CM

## 2022-12-05 PROCEDURE — 92250 FUNDUS PHOTOGRAPHY W/I&R: CPT | Performed by: OPHTHALMOLOGY

## 2022-12-05 PROCEDURE — 92014 COMPRE OPH EXAM EST PT 1/>: CPT | Performed by: OPHTHALMOLOGY

## 2022-12-05 PROCEDURE — 92083 EXTENDED VISUAL FIELD XM: CPT | Performed by: OPHTHALMOLOGY

## 2022-12-05 ASSESSMENT — REFRACTION_AUTOREFRACTION
OS_AXIS: 014
OD_CYLINDER: -0.75
OS_CYLINDER: -1.25
OD_SPHERE: +0.50
OS_SPHERE: PLANO
OD_AXIS: 148

## 2022-12-05 ASSESSMENT — REFRACTION_CURRENTRX
OS_OVR_VA: 20/
OD_OVR_VA: 20/
OS_SPHERE: +2.75
OS_VPRISM_DIRECTION: SV
OD_SPHERE: +2.75
OD_VPRISM_DIRECTION: SV

## 2022-12-05 ASSESSMENT — SPHEQUIV_DERIVED
OD_SPHEQUIV: 0.125
OD_SPHEQUIV: 0.125

## 2022-12-05 ASSESSMENT — CONFRONTATIONAL VISUAL FIELD TEST (CVF)
OS_FINDINGS: FULL
OD_FINDINGS: FULL

## 2022-12-05 ASSESSMENT — REFRACTION_MANIFEST
OD_AXIS: 148
OS_CYLINDER: -1.25
OD_SPHERE: +0.50
OS_SPHERE: PLANO
OD_CYLINDER: -0.75
OS_AXIS: 014
OS_VA1: 20/25
OD_VA1: 20/25

## 2022-12-05 ASSESSMENT — KERATOMETRY
OD_K2POWER_DIOPTERS: 43.50
OS_K1POWER_DIOPTERS: 42.25
OS_AXISANGLE_DEGREES: 103
OS_K2POWER_DIOPTERS: 45.25
OD_AXISANGLE_DEGREES: 076
OD_K1POWER_DIOPTERS: 42.00

## 2022-12-05 ASSESSMENT — TONOMETRY
OD_IOP_MMHG: 21
OS_IOP_MMHG: 21

## 2022-12-05 ASSESSMENT — VISUAL ACUITY
OD_BCVA: 20/30+3
OS_BCVA: 20/25-2

## 2022-12-05 ASSESSMENT — PACHYMETRY
OS_CT_UM: 635
OS_CT_CORRECTION: -6

## 2022-12-05 ASSESSMENT — AXIALLENGTH_DERIVED
OD_AL: 23.8209
OD_AL: 23.8209

## 2022-12-13 ENCOUNTER — FORM ENCOUNTER (OUTPATIENT)
Age: 87
End: 2022-12-13

## 2022-12-13 ENCOUNTER — NON-APPOINTMENT (OUTPATIENT)
Age: 87
End: 2022-12-13

## 2022-12-13 ENCOUNTER — APPOINTMENT (OUTPATIENT)
Dept: CARDIOLOGY | Facility: CLINIC | Age: 87
End: 2022-12-13

## 2022-12-13 VITALS
WEIGHT: 160 LBS | HEART RATE: 62 BPM | HEIGHT: 66 IN | SYSTOLIC BLOOD PRESSURE: 149 MMHG | DIASTOLIC BLOOD PRESSURE: 65 MMHG | BODY MASS INDEX: 25.71 KG/M2

## 2022-12-13 PROCEDURE — 99214 OFFICE O/P EST MOD 30 MIN: CPT | Mod: 25

## 2022-12-13 PROCEDURE — 93000 ELECTROCARDIOGRAM COMPLETE: CPT

## 2022-12-13 NOTE — HISTORY OF PRESENT ILLNESS
[FreeTextEntry1] : Pt is a 89 y/o M who presents today for f/u. PMH afib on Eliquis and amio, CKD, HTN, PVD, HLD, CHB s/p PPM with Dr Allred 02/2020.  \par He was hospitalized 05/2022 for acute on chronic renal failure, following with nephrology\par He is no longer on lasix.  Cr improved 2.63 to 1.79\par PPM interrogation reviewed - avg HR 86\par With recent viral illness - CXR done 11/22/2022 showed small b/l pleural effusion R>L\par He is feeling better overall, he states that he is no longer coughing and breathing is good - he denies CP, SOB, diaphoresis, palpitations, dizziness, syncope, LE edema, PND, orthopnea. \par \par TTE 02/2019 EF 77% mild-mod MR/TR, mild AI, mild-mod LVH\par Nuclear stress test 02/2019 normal myocardial perfusion\par \par Tchol 136\par HDL 70\par LDL 57\par A1c 6.3 - 6\par Cr 2.63 - 1.79 - 1.84\par \par PMH: PVD s/p peripheral stent RLE with Dr Singh, afib on Eliquis, ambulates with cane, HLD, HTN, colon cancer, PPM\par Smoking status: quit in 1963\par Current exercise: none\par Daily water intake: 50 oz\par Daily caffeine intake: 1-2 cups coffee\par OTC medications: ASA\par Family hx: pt denies any immediate family history cardiac disease\par Previous hospitalizations: gout/cellulitis\par surgeries: colon cancer, partial nephrectomy 2006 benign mass\par

## 2022-12-13 NOTE — DISCUSSION/SUMMARY
[FreeTextEntry1] : Pt is a 89 y/o M who presents today for f/u. PMH afib on Eliquis, CKD, HTN, PVD, HLD, CHB s/p PPM with Dr Allred 02/2020.\par With recent viral illness - CXR done 11/22/2022 showed small b/l pleural effusion R>L\par He is feeling better overall, he states that he is no longer coughing and breathing is good - he denies CP, SOB, diaphoresis, palpitations, dizziness, syncope, LE edema, PND, orthopnea. \par \par TTE 02/2019 EF 77% mild-mod MR/TR, mild AI, mild-mod LVH\par Nuclear stress test 02/2019 normal myocardial perfusion\par \par recent viral illness with pleural effusions seen on CXR:\par he is clinically doing better\par will hold off on diuretics now\par he is having CXR repeated tomorrow\par Advised pt to go to the nearest ED if symptoms persist or worsen \par \par AF:\par HR improved\par c/w amio and BB\par c/w Eliquis - no bleeding problems\par \par HTN:\par well controlled\par c/w BB\par Advised low salt diet, regular exercise, weight loss \par \par HLD:\par c/w statin\par Advised lifestyle modifications \par \par Pt will return in 6 mnths or sooner as needed but I encouraged communication via phone or portal if necessary. \par The described plan was discussed with the pt.  All questions and concerns were addressed to the best of my knowledge. \par \par \par

## 2022-12-19 ENCOUNTER — NON-APPOINTMENT (OUTPATIENT)
Age: 87
End: 2022-12-19

## 2022-12-26 ENCOUNTER — APPOINTMENT (OUTPATIENT)
Dept: ELECTROPHYSIOLOGY | Facility: CLINIC | Age: 87
End: 2022-12-26

## 2022-12-27 ENCOUNTER — NON-APPOINTMENT (OUTPATIENT)
Age: 87
End: 2022-12-27

## 2022-12-27 PROCEDURE — 93296 REM INTERROG EVL PM/IDS: CPT

## 2022-12-27 PROCEDURE — 93294 REM INTERROG EVL PM/LDLS PM: CPT

## 2023-01-12 ENCOUNTER — APPOINTMENT (OUTPATIENT)
Dept: OTOLARYNGOLOGY | Facility: CLINIC | Age: 88
End: 2023-01-12
Payer: MEDICARE

## 2023-01-12 VITALS — WEIGHT: 165 LBS | HEIGHT: 66 IN | TEMPERATURE: 95.3 F | BODY MASS INDEX: 26.52 KG/M2

## 2023-01-12 DIAGNOSIS — H90.3 SENSORINEURAL HEARING LOSS, BILATERAL: ICD-10-CM

## 2023-01-12 PROCEDURE — 92557 COMPREHENSIVE HEARING TEST: CPT

## 2023-01-12 PROCEDURE — 92567 TYMPANOMETRY: CPT

## 2023-01-12 PROCEDURE — 99203 OFFICE O/P NEW LOW 30 MIN: CPT

## 2023-01-12 NOTE — DATA REVIEWED
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

## 2023-01-12 NOTE — HISTORY OF PRESENT ILLNESS
[de-identified] : Patient c/o hearing loss.  No prior ear issues but does have hx of noise exposure - worked for [employer]

## 2023-01-12 NOTE — ASSESSMENT
[FreeTextEntry1] : Patient with hearing loss.  Exam normal - audio shows bilat snhl.  Recommended HAE.  Follow up one year.

## 2023-01-18 ENCOUNTER — APPOINTMENT (OUTPATIENT)
Dept: FAMILY MEDICINE | Facility: CLINIC | Age: 88
End: 2023-01-18
Payer: MEDICARE

## 2023-01-18 VITALS
HEIGHT: 66 IN | DIASTOLIC BLOOD PRESSURE: 68 MMHG | BODY MASS INDEX: 25.71 KG/M2 | SYSTOLIC BLOOD PRESSURE: 106 MMHG | OXYGEN SATURATION: 98 % | WEIGHT: 160 LBS | RESPIRATION RATE: 16 BRPM | TEMPERATURE: 98.2 F | HEART RATE: 60 BPM

## 2023-01-18 PROCEDURE — 99214 OFFICE O/P EST MOD 30 MIN: CPT | Mod: 25

## 2023-01-18 PROCEDURE — 36415 COLL VENOUS BLD VENIPUNCTURE: CPT

## 2023-01-18 RX ORDER — DOXYCYCLINE HYCLATE 100 MG/1
100 CAPSULE ORAL
Qty: 14 | Refills: 0 | Status: COMPLETED | COMMUNITY
Start: 2022-11-21 | End: 2023-01-18

## 2023-01-18 NOTE — PHYSICAL EXAM
[Normal Sclera/Conjunctiva] : normal sclera/conjunctiva [Supple] : supple [Normal Rate] : normal rate  [Normal S1, S2] : normal S1 and S2 [Pedal Pulses Present] : the pedal pulses are present [No Edema] : there was no peripheral edema [No CVA Tenderness] : no CVA  tenderness [No Spinal Tenderness] : no spinal tenderness [No Rash] : no rash [Normal] : affect was normal and insight and judgment were intact [de-identified] : irregular

## 2023-01-18 NOTE — HISTORY OF PRESENT ILLNESS
[FreeTextEntry1] : Follow up [de-identified] : Mr. TAVIA RAYMUNDO is a 89 year old male presenting for a follow up\par States he has reflux symptoms on and off more when he is reading for a long period of time.\par Drinks 2 cups of tea. Diet has not changed. Does not eat late at night.\par Seen by Nephrologist Dr Smiley.\par Improved.\par Has appointment 2/23\par HLD\par On atorvastatin,labs 10/22 in chart\par HTN BP stable.\par On  Metoprolol. No complaints of dizziness.\par Off Amlodipine for dizziness, BP was low.\par A1C elevated, diet is poor. Eats icecream daily.\par No complaints.\par Seen by cardiologist Dr Marquez 7/22\par Dermatology follow up for Skin [facility]\par \par A Fib on Eliquis. Amidarone. Dr Allred. on Metoprolol\par \par Nocturia goes 3 times at night. On Tamsulosin.\par

## 2023-01-18 NOTE — ASSESSMENT
[FreeTextEntry1] : CKD\par Patient seen nephrology Dr Smiley\par Check labs\par Has follow up in 2/23.\par \par GERD \par Pepcid prn\par \par Hypertension:\par Blood pressure okay, On metoprolol succinate 25 mg.\par \par Atrial fibrillation:\par Seen by cardiology 7/22\par on metoprolol and Eliquis 2.5 mg twice daily.\par \par Follow up in 3 months

## 2023-01-23 LAB
ALBUMIN SERPL ELPH-MCNC: 4.1 G/DL
ANION GAP SERPL CALC-SCNC: 12 MMOL/L
BUN SERPL-MCNC: 29 MG/DL
CALCIUM SERPL-MCNC: 9.2 MG/DL
CHLORIDE SERPL-SCNC: 106 MMOL/L
CO2 SERPL-SCNC: 24 MMOL/L
CREAT SERPL-MCNC: 1.85 MG/DL
EGFR: 34 ML/MIN/1.73M2
GLUCOSE SERPL-MCNC: 100 MG/DL
PHOSPHATE SERPL-MCNC: 3.8 MG/DL
POTASSIUM SERPL-SCNC: 4.8 MMOL/L
SODIUM SERPL-SCNC: 142 MMOL/L

## 2023-02-21 ENCOUNTER — APPOINTMENT (OUTPATIENT)
Dept: NEPHROLOGY | Facility: CLINIC | Age: 88
End: 2023-02-21
Payer: MEDICARE

## 2023-02-21 VITALS
BODY MASS INDEX: 25.23 KG/M2 | HEIGHT: 66 IN | HEART RATE: 60 BPM | SYSTOLIC BLOOD PRESSURE: 114 MMHG | DIASTOLIC BLOOD PRESSURE: 72 MMHG | WEIGHT: 157 LBS | RESPIRATION RATE: 14 BRPM | OXYGEN SATURATION: 96 %

## 2023-02-21 PROCEDURE — 99214 OFFICE O/P EST MOD 30 MIN: CPT

## 2023-02-21 NOTE — HISTORY OF PRESENT ILLNESS
[___ Month(s) Ago] : [unfilled] month(s) ago [Ordering Test(s) ___] : ordering [unfilled] [Hypertensive Nephropathy] : hypertensive nephropathy [None] : ~he/she~ is currently asymptomatic [Diuretics] : diuretics [Antihypertensives] : antihypertensives [Vitamin D] : vitamin D [Iron Supplement] : iron supplement [Good Compliance] : good compliance  [Good Tolerance] : good tolerance  [Good Symptom Control] : good symptom control [FreeTextEntry1] : 89 year old man with past medical history of BPH, HTN, Gout, Pre-Diabetic, benign R sided renal mass s/p partial nephrectomy in 2006, here for follow up of CKD.\par \par Today, \par Patient reports no health related adverse event since last clinic visit. \par NO ER/Hospitalization/urgent care visit. \par Denies any cardiac or urinary complaints\par No dysuria, gross hematuria, SOB, LUTS.\par Reports BP has been well controlled. \par \par Current: Pt seen/examined; no complaints.\par He feels fine and denies any symptoms. \par

## 2023-02-21 NOTE — ASSESSMENT
[FreeTextEntry1] : 1) CKD III in the setting of long standing HTN, partial nephrectomy\par Scr stable; lytes wnl. Scr 1.85 on Jan 23, 23.\par kidney function has been stable around 1.8-2 range\par UACR <30\par BMD labs WNL\par \par 2) HTN/ Volume\par Bp stable; pt euvolemic on exam\par \par 3) Anemia of CKD with superimposed iron def\par Hb at goal- continue po iron\par \par 4) BPH\par Continue Flomax\par \par RTC in 4-6mo

## 2023-02-21 NOTE — PHYSICAL EXAM
[General Appearance - Alert] : alert [General Appearance - In No Acute Distress] : in no acute distress [General Appearance - Well Nourished] : well nourished [General Appearance - Well Developed] : well developed [General Appearance - Well-Appearing] : healthy appearing [Sclera] : the sclera and conjunctiva were normal [Strabismus] : no strabismus was seen [Outer Ear] : the ears and nose were normal in appearance [Hearing Threshold Finger Rub Not [place]] : hearing was normal [Neck Appearance] : the appearance of the neck was normal [Neck Cervical Mass (___cm)] : no neck mass was observed [Jugular Venous Distention Increased] : there was no jugular-venous distention [Respiration, Rhythm And Depth] : normal respiratory rhythm and effort [Exaggerated Use Of Accessory Muscles For Inspiration] : no accessory muscle use [Auscultation Breath Sounds / Voice Sounds] : lungs were clear to auscultation bilaterally [Apical Impulse] : the apical impulse was normal [Heart Rate And Rhythm] : heart rate was normal and rhythm regular [Heart Sounds] : normal S1 and S2 [Edema] : there was no peripheral edema [Abdomen Soft] : soft [Abdomen Tenderness] : non-tender [FreeTextEntry1] : Deferred [Cervical Lymph Nodes Enlarged Anterior Bilaterally] : anterior cervical [Supraclavicular Lymph Nodes Enlarged Bilaterally] : supraclavicular [No CVA Tenderness] : no ~M costovertebral angle tenderness [Abnormal Walk] : normal gait [Involuntary Movements] : no involuntary movements were seen [Skin Color & Pigmentation] : normal skin color and pigmentation [Skin Turgor] : normal skin turgor [] : no rash [Skin Lesions] : no skin lesions [No Focal Deficits] : no focal deficits [Oriented To Time, Place, And Person] : oriented to person, place, and time [Impaired Insight] : insight and judgment were intact [Affect] : the affect was normal [Mood] : the mood was normal

## 2023-03-28 ENCOUNTER — NON-APPOINTMENT (OUTPATIENT)
Age: 88
End: 2023-03-28

## 2023-03-28 ENCOUNTER — APPOINTMENT (OUTPATIENT)
Dept: ELECTROPHYSIOLOGY | Facility: CLINIC | Age: 88
End: 2023-03-28
Payer: MEDICARE

## 2023-03-28 PROCEDURE — 93294 REM INTERROG EVL PM/LDLS PM: CPT

## 2023-03-28 PROCEDURE — 93296 REM INTERROG EVL PM/IDS: CPT

## 2023-05-16 ENCOUNTER — APPOINTMENT (OUTPATIENT)
Dept: FAMILY MEDICINE | Facility: CLINIC | Age: 88
End: 2023-05-16

## 2023-05-17 ENCOUNTER — APPOINTMENT (OUTPATIENT)
Dept: FAMILY MEDICINE | Facility: CLINIC | Age: 88
End: 2023-05-17
Payer: MEDICARE

## 2023-05-17 VITALS
TEMPERATURE: 98.3 F | SYSTOLIC BLOOD PRESSURE: 132 MMHG | OXYGEN SATURATION: 98 % | HEART RATE: 60 BPM | BODY MASS INDEX: 25.39 KG/M2 | RESPIRATION RATE: 16 BRPM | HEIGHT: 66 IN | WEIGHT: 158 LBS | DIASTOLIC BLOOD PRESSURE: 62 MMHG

## 2023-05-17 PROCEDURE — 99214 OFFICE O/P EST MOD 30 MIN: CPT
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
## 2023-05-17 NOTE — PHYSICAL EXAM
[Normal Sclera/Conjunctiva] : normal sclera/conjunctiva [Supple] : supple [Normal Rate] : normal rate  [Normal S1, S2] : normal S1 and S2 [Pedal Pulses Present] : the pedal pulses are present [No Edema] : there was no peripheral edema [No CVA Tenderness] : no CVA  tenderness [No Spinal Tenderness] : no spinal tenderness [No Rash] : no rash [Normal] : affect was normal and insight and judgment were intact [de-identified] : irregular

## 2023-05-26 ENCOUNTER — TRANSCRIPTION ENCOUNTER (OUTPATIENT)
Age: 88
End: 2023-05-26

## 2023-05-26 LAB
ALBUMIN SERPL ELPH-MCNC: 4 G/DL
ALP BLD-CCNC: 106 U/L
ALT SERPL-CCNC: 19 U/L
ANION GAP SERPL CALC-SCNC: 10 MMOL/L
AST SERPL-CCNC: 28 U/L
BASOPHILS # BLD AUTO: 0.05 K/UL
BASOPHILS NFR BLD AUTO: 0.8 %
BILIRUB SERPL-MCNC: 0.6 MG/DL
BUN SERPL-MCNC: 34 MG/DL
CALCIUM SERPL-MCNC: 9.3 MG/DL
CHLORIDE SERPL-SCNC: 107 MMOL/L
CHOLEST SERPL-MCNC: 130 MG/DL
CO2 SERPL-SCNC: 26 MMOL/L
CREAT SERPL-MCNC: 1.94 MG/DL
EGFR: 32 ML/MIN/1.73M2
EOSINOPHIL # BLD AUTO: 0.06 K/UL
EOSINOPHIL NFR BLD AUTO: 1 %
ESTIMATED AVERAGE GLUCOSE: 126 MG/DL
FERRITIN SERPL-MCNC: 400 NG/ML
GLUCOSE SERPL-MCNC: 87 MG/DL
HBA1C MFR BLD HPLC: 6 %
HCT VFR BLD CALC: 35.7 %
HDLC SERPL-MCNC: 70 MG/DL
HGB BLD-MCNC: 11.7 G/DL
IMM GRANULOCYTES NFR BLD AUTO: 0.5 %
IRON SATN MFR SERPL: 28 %
IRON SERPL-MCNC: 65 UG/DL
LDLC SERPL CALC-MCNC: 52 MG/DL
LYMPHOCYTES # BLD AUTO: 1.06 K/UL
LYMPHOCYTES NFR BLD AUTO: 17.8 %
MAN DIFF?: NORMAL
MCHC RBC-ENTMCNC: 31.9 PG
MCHC RBC-ENTMCNC: 32.8 GM/DL
MCV RBC AUTO: 97.3 FL
MONOCYTES # BLD AUTO: 0.55 K/UL
MONOCYTES NFR BLD AUTO: 9.3 %
NEUTROPHILS # BLD AUTO: 4.19 K/UL
NEUTROPHILS NFR BLD AUTO: 70.6 %
NONHDLC SERPL-MCNC: 61 MG/DL
PLATELET # BLD AUTO: 160 K/UL
POTASSIUM SERPL-SCNC: 4.9 MMOL/L
PROT SERPL-MCNC: 6.4 G/DL
RBC # BLD: 3.67 M/UL
RBC # FLD: 15.1 %
SODIUM SERPL-SCNC: 143 MMOL/L
TIBC SERPL-MCNC: 234 UG/DL
TRIGL SERPL-MCNC: 45 MG/DL
UIBC SERPL-MCNC: 169 UG/DL
VIT B12 SERPL-MCNC: >2000 PG/ML
WBC # FLD AUTO: 5.94 K/UL

## 2023-06-08 ENCOUNTER — OFFICE (OUTPATIENT)
Dept: URBAN - METROPOLITAN AREA CLINIC 12 | Facility: CLINIC | Age: 88
Setting detail: OPHTHALMOLOGY
End: 2023-06-08
Payer: MEDICARE

## 2023-06-08 DIAGNOSIS — Z96.1: ICD-10-CM

## 2023-06-08 DIAGNOSIS — H35.3131: ICD-10-CM

## 2023-06-08 DIAGNOSIS — H40.013: ICD-10-CM

## 2023-06-08 PROCEDURE — 92133 CPTRZD OPH DX IMG PST SGM ON: CPT | Performed by: OPHTHALMOLOGY

## 2023-06-08 PROCEDURE — 92020 GONIOSCOPY: CPT | Performed by: OPHTHALMOLOGY

## 2023-06-08 PROCEDURE — 99213 OFFICE O/P EST LOW 20 MIN: CPT | Performed by: OPHTHALMOLOGY

## 2023-06-08 PROCEDURE — 92083 EXTENDED VISUAL FIELD XM: CPT | Performed by: OPHTHALMOLOGY

## 2023-06-08 ASSESSMENT — REFRACTION_MANIFEST
OD_AXIS: 148
OD_SPHERE: +0.50
OS_VA1: 20/25
OS_CYLINDER: -1.25
OS_AXIS: 014
OD_VA1: 20/25
OS_SPHERE: PLANO
OD_CYLINDER: -0.75

## 2023-06-08 ASSESSMENT — REFRACTION_CURRENTRX
OS_OVR_VA: 20/
OS_VPRISM_DIRECTION: SV
OD_OVR_VA: 20/
OD_ADD: +2.75
OS_ADD: +2.75
OD_VPRISM_DIRECTION: SV

## 2023-06-08 ASSESSMENT — SPHEQUIV_DERIVED
OD_SPHEQUIV: 0.25
OD_SPHEQUIV: 0.125

## 2023-06-08 ASSESSMENT — REFRACTION_AUTOREFRACTION
OS_CYLINDER: -1.75
OS_SPHERE: PLANO
OD_SPHERE: +0.75
OD_AXIS: 147
OS_AXIS: 010
OD_CYLINDER: -1.00

## 2023-06-08 ASSESSMENT — VISUAL ACUITY
OS_BCVA: 20/40-2
OD_BCVA: 20/25-1

## 2023-06-08 ASSESSMENT — KERATOMETRY
OS_K1POWER_DIOPTERS: 42.00
OD_K2POWER_DIOPTERS: 43.25
OD_K1POWER_DIOPTERS: 42.50
OS_K2POWER_DIOPTERS: 44.75
OD_AXISANGLE_DEGREES: 077
OS_AXISANGLE_DEGREES: 090

## 2023-06-08 ASSESSMENT — CONFRONTATIONAL VISUAL FIELD TEST (CVF)
OD_FINDINGS: FULL
OS_FINDINGS: FULL

## 2023-06-08 ASSESSMENT — TONOMETRY
OS_IOP_MMHG: 15
OD_IOP_MMHG: 14

## 2023-06-08 ASSESSMENT — AXIALLENGTH_DERIVED
OD_AL: 23.7248
OD_AL: 23.7742

## 2023-06-13 ENCOUNTER — FORM ENCOUNTER (OUTPATIENT)
Age: 88
End: 2023-06-13

## 2023-06-20 ENCOUNTER — APPOINTMENT (OUTPATIENT)
Dept: CARDIOLOGY | Facility: CLINIC | Age: 88
End: 2023-06-20
Payer: MEDICARE

## 2023-06-20 ENCOUNTER — NON-APPOINTMENT (OUTPATIENT)
Age: 88
End: 2023-06-20

## 2023-06-20 VITALS
BODY MASS INDEX: 25.39 KG/M2 | OXYGEN SATURATION: 97 % | WEIGHT: 158 LBS | HEIGHT: 66 IN | HEART RATE: 60 BPM | SYSTOLIC BLOOD PRESSURE: 122 MMHG | DIASTOLIC BLOOD PRESSURE: 70 MMHG

## 2023-06-20 PROCEDURE — 99214 OFFICE O/P EST MOD 30 MIN: CPT | Mod: 25

## 2023-06-20 PROCEDURE — 93000 ELECTROCARDIOGRAM COMPLETE: CPT

## 2023-06-20 NOTE — DISCUSSION/SUMMARY
[FreeTextEntry1] : Pt is a 89 y/o M who presents today for f/u. PMH afib on Eliquis, CKD, HTN, PVD, HLD, CHB s/p PPM with Dr Allred 02/2020.\par \par TTE 02/2019 EF 77% mild-mod MR/TR, mild AI, mild-mod LVH\par Nuclear stress test 02/2019 normal myocardial perfusion\par \par AF:\par HR improved\par c/w amio and BB\par c/w Eliquis - no bleeding problems\par repeat TTE\par \par HTN:\par well controlled\par c/w BB\par Advised low salt diet, regular exercise, weight loss \par \par HLD:\par c/w statin\par Advised lifestyle modifications \par \par Pt will return in 6 mnths or sooner as needed but I encouraged communication via phone or portal if necessary. \par The described plan was discussed with the pt.  All questions and concerns were addressed to the best of my knowledge. \par \par \par

## 2023-06-20 NOTE — HISTORY OF PRESENT ILLNESS
[FreeTextEntry1] : Pt is a 89 y/o M who presents today for f/u. 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  \par He was hospitalized 05/2022 for acute on chronic renal failure, following with nephrology\par He is no longer on lasix.  Cr improved 2.63 to 1.79\par PPM interrogation reviewed - avg HR 86\par viral illness 11/2022 - CXR done showed small b/l pleural effusion R>L - no diuretics needed at the time\par \par Pt reports feeling well and has no active cardiac complaints - denies CP, SOB, palpitations, dizziness, syncope, edema, orthopnea, PND, orthopnea.  No exertional symptoms. \par \par TTE 02/2019 EF 77% mild-mod MR/TR, mild AI, mild-mod LVH\par Nuclear stress test 02/2019 normal myocardial perfusion\par \par Tchol 136 - 130\par HDL 70 - 70\par LDL 57 - 52\par A1c 6.3 - 6\par Cr 2.63 - 1.79 - 1.84 - 1.94\par \par PMH: PVD s/p peripheral stent RLE with Dr Singh, afib on Eliquis, ambulates with cane, HLD, HTN, colon cancer, PPM\par Smoking status: quit in 1963\par Current exercise: none\par Daily water intake: 50 oz\par Daily caffeine intake: 1-2 cups coffee\par OTC medications: ASA\par Family hx: pt denies any immediate family history cardiac disease\par Previous hospitalizations: gout/cellulitis\par surgeries: colon cancer, partial nephrectomy 2006 benign mass\par

## 2023-06-26 ENCOUNTER — APPOINTMENT (OUTPATIENT)
Dept: ELECTROPHYSIOLOGY | Facility: CLINIC | Age: 88
End: 2023-06-26
Payer: MEDICARE

## 2023-06-27 ENCOUNTER — APPOINTMENT (OUTPATIENT)
Dept: NEPHROLOGY | Facility: CLINIC | Age: 88
End: 2023-06-27
Payer: MEDICARE

## 2023-06-27 ENCOUNTER — NON-APPOINTMENT (OUTPATIENT)
Age: 88
End: 2023-06-27

## 2023-06-27 VITALS
DIASTOLIC BLOOD PRESSURE: 84 MMHG | SYSTOLIC BLOOD PRESSURE: 146 MMHG | BODY MASS INDEX: 25.55 KG/M2 | HEART RATE: 66 BPM | HEIGHT: 66 IN | WEIGHT: 159 LBS | OXYGEN SATURATION: 98 %

## 2023-06-27 PROCEDURE — 93294 REM INTERROG EVL PM/LDLS PM: CPT

## 2023-06-27 PROCEDURE — 93296 REM INTERROG EVL PM/IDS: CPT

## 2023-06-27 PROCEDURE — 99213 OFFICE O/P EST LOW 20 MIN: CPT

## 2023-07-17 ENCOUNTER — APPOINTMENT (OUTPATIENT)
Dept: FAMILY MEDICINE | Facility: CLINIC | Age: 88
End: 2023-07-17
Payer: MEDICARE

## 2023-07-17 VITALS
OXYGEN SATURATION: 97 % | BODY MASS INDEX: 25.23 KG/M2 | HEIGHT: 66 IN | RESPIRATION RATE: 16 BRPM | WEIGHT: 157 LBS | HEART RATE: 60 BPM | SYSTOLIC BLOOD PRESSURE: 140 MMHG | DIASTOLIC BLOOD PRESSURE: 60 MMHG | TEMPERATURE: 98.1 F

## 2023-07-17 DIAGNOSIS — Z00.00 ENCOUNTER FOR GENERAL ADULT MEDICAL EXAMINATION W/OUT ABNORMAL FINDINGS: ICD-10-CM

## 2023-07-17 PROCEDURE — G0439: CPT

## 2023-07-17 NOTE — HEALTH RISK ASSESSMENT
[No] : In the past 12 months have you used drugs other than those required for medical reasons? No [No falls in past year] : Patient reported no falls in the past year [0] : 2) Feeling down, depressed, or hopeless: Not at all (0) [PHQ-2 Negative - No further assessment needed] : PHQ-2 Negative - No further assessment needed [Audit-CScore] : 0 [LDS2Yuhat] : 0 [Change in mental status noted] : No change in mental status noted [None] : None [With Family] : lives with family [Retired] : retired [Fully functional (bathing, dressing, toileting, transferring, walking, feeding)] : Fully functional (bathing, dressing, toileting, transferring, walking, feeding) [Fully functional (using the telephone, shopping, preparing meals, housekeeping, doing laundry, using] : Fully functional and needs no help or supervision to perform IADLs (using the telephone, shopping, preparing meals, housekeeping, doing laundry, using transportation, managing medications and managing finances) [Reports changes in hearing] : Reports changes in hearing [Reports changes in vision] : Reports no changes in vision [Reports changes in dental health] : Reports no changes in dental health [Smoke Detector] : smoke detector [Carbon Monoxide Detector] : carbon monoxide detector [Seat Belt] :  uses seat belt [Sunscreen] : uses sunscreen [TB Exposure] : is not being exposed to tuberculosis [de-identified] : Has hearing Aide [de-identified] : seen by ophthalmologist recently [Reviewed no changes] : Reviewed, no changes [Designated Healthcare Proxy] : Designated healthcare proxy [Relationship: ___] : Relationship: [unfilled] [AdvancecareDate] : 7/2023 [Never] : Never

## 2023-07-17 NOTE — ASSESSMENT
[FreeTextEntry1] : Annual\par SBIRT negative\par Depression screen negative\par EKG in chart 6/23\par \par Vaccines \par Adacel declined\par Pneumovax 2020, Prevnar 2015\par Shingrix declined\par Flu 9/2022\par COVID booster declined\par \par DEXA declined\par \par CKD\par Patient seen nephrology Dr Samano 7/23.\par Has follow up 1/2024\par \par Anemia CKD and iron def on iron\par Continue iron \par Labs in chart 5/23\par \par Hypertension:\par Blood pressure okay, On metoprolol succinate 25 mg.\par \par HLD\par On ATorvastatin 10 mg qd\par Check labs\par \par Atrial fibrillation:\par Seen by cardiology 6/2023\par on metoprolol 50 mg qd, amiodarone and Eliquis 2.5 mg twice daily.\par Dr Marquez\par PVD seeing cardiologist \par Follow up in 3 months.

## 2023-07-17 NOTE — ASSESSMENT
[FreeTextEntry1] : 1) CKD III in the setting of long standing HTN, partial nephrectomy\par Scr stable; lytes wnl. Scr 1.94 on May 17, 23.\par kidney function has been stable around 1.8-2 range\par UACR <30\par BMD labs WNL\par \par 2) HTN/ Volume\par Bp stable; pt euvolemic on exam\par \par 3) Anemia of CKD with superimposed iron def\par Hb at goal- continue po iron\par \par 4) BPH\par Continue Flomax\par \par RTC in 4-6mo

## 2023-07-17 NOTE — PHYSICAL EXAM
[Normal Sclera/Conjunctiva] : normal sclera/conjunctiva [Supple] : supple [Normal Rate] : normal rate  [Normal S1, S2] : normal S1 and S2 [Pedal Pulses Present] : the pedal pulses are present [No Edema] : there was no peripheral edema [No CVA Tenderness] : no CVA  tenderness [No Spinal Tenderness] : no spinal tenderness [No Rash] : no rash [Normal] : affect was normal and insight and judgment were intact [de-identified] : irregular

## 2023-07-17 NOTE — PHYSICAL EXAM
[General Appearance - Alert] : alert [General Appearance - In No Acute Distress] : in no acute distress [General Appearance - Well Nourished] : well nourished [General Appearance - Well Developed] : well developed [General Appearance - Well-Appearing] : healthy appearing [Sclera] : the sclera and conjunctiva were normal [Strabismus] : no strabismus was seen [Outer Ear] : the ears and nose were normal in appearance [Hearing Threshold Finger Rub Not Swift] : hearing was normal [Neck Appearance] : the appearance of the neck was normal [Neck Cervical Mass (___cm)] : no neck mass was observed [Jugular Venous Distention Increased] : there was no jugular-venous distention [Respiration, Rhythm And Depth] : normal respiratory rhythm and effort [Exaggerated Use Of Accessory Muscles For Inspiration] : no accessory muscle use [Auscultation Breath Sounds / Voice Sounds] : lungs were clear to auscultation bilaterally [Apical Impulse] : the apical impulse was normal [Heart Rate And Rhythm] : heart rate was normal and rhythm regular [Heart Sounds] : normal S1 and S2 [Edema] : there was no peripheral edema [Abdomen Soft] : soft [Abdomen Tenderness] : non-tender [FreeTextEntry1] : Deferred [Cervical Lymph Nodes Enlarged Anterior Bilaterally] : anterior cervical [Supraclavicular Lymph Nodes Enlarged Bilaterally] : supraclavicular [No CVA Tenderness] : no ~M costovertebral angle tenderness [Abnormal Walk] : normal gait [Involuntary Movements] : no involuntary movements were seen [Skin Color & Pigmentation] : normal skin color and pigmentation [Skin Turgor] : normal skin turgor [] : no rash [Skin Lesions] : no skin lesions [No Focal Deficits] : no focal deficits [Oriented To Time, Place, And Person] : oriented to person, place, and time [Impaired Insight] : insight and judgment were intact [Affect] : the affect was normal [Mood] : the mood was normal

## 2023-07-17 NOTE — REVIEW OF SYSTEMS
[Joint Pain] : joint pain [Joint Stiffness] : joint stiffness [Back Pain] : back pain [Joint Swelling] : no joint swelling [Skin Rash] : no skin rash [Negative] : Heme/Lymph

## 2023-07-17 NOTE — HISTORY OF PRESENT ILLNESS
[___ Month(s) Ago] : [unfilled] month(s) ago [Ordering Test(s) ___] : ordering [unfilled] [Hypertensive Nephropathy] : hypertensive nephropathy [None] : ~he/she~ is currently asymptomatic [Diuretics] : diuretics [Antihypertensives] : antihypertensives [Vitamin D] : vitamin D [Iron Supplement] : iron supplement [Good Compliance] : good compliance  [Good Tolerance] : good tolerance  [Good Symptom Control] : good symptom control [FreeTextEntry1] : 89 year old man with past medical history of BPH, HTN, Gout, Pre-Diabetic, benign R sided renal mass s/p partial nephrectomy in 2006, here for follow up of CKD.\par \par Today, no new complaints\par Patient reports no health related adverse event since last clinic visit. \par NO ER/Hospitalization/urgent care visit. \par Denies any cardiac or urinary complaints\par No dysuria, gross hematuria, SOB, LUTS.\par Reports BP has been well controlled. \par \par Current: Pt seen/examined; no complaints.\par He feels fine and denies any symptoms. \par
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
## 2023-07-17 NOTE — REVIEW OF SYSTEMS
[Eye Pain] : no eye pain [Red Eyes] : eyes not red [Earache] : no earache [Chest Pain] : no chest pain [Nosebleeds] : no nosebleeds [Skin Lesions] : no skin lesions [Itching] : no itching [Dizziness] : no dizziness [Fainting] : no fainting [Anxiety] : no anxiety [Depression] : no depression [Muscle Weakness] : no muscle weakness [Negative] : Heme/Lymph
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
## 2023-07-17 NOTE — COUNSELING
[Fall prevention counseling provided] : Fall prevention counseling provided [Adequate lighting] : Adequate lighting [No throw rugs] : No throw rugs [Use proper foot wear] : Use proper foot wear [Use recommended devices] : Use recommended devices [FreeTextEntry1] : cane

## 2023-08-14 ENCOUNTER — RX RENEWAL (OUTPATIENT)
Age: 88
End: 2023-08-14

## 2023-08-21 ENCOUNTER — RX RENEWAL (OUTPATIENT)
Age: 88
End: 2023-08-21

## 2023-09-25 ENCOUNTER — APPOINTMENT (OUTPATIENT)
Dept: ELECTROPHYSIOLOGY | Facility: CLINIC | Age: 88
End: 2023-09-25
Payer: MEDICARE

## 2023-09-26 ENCOUNTER — NON-APPOINTMENT (OUTPATIENT)
Age: 88
End: 2023-09-26

## 2023-09-26 PROCEDURE — 93296 REM INTERROG EVL PM/IDS: CPT

## 2023-09-26 PROCEDURE — 93294 REM INTERROG EVL PM/LDLS PM: CPT

## 2023-10-31 ENCOUNTER — APPOINTMENT (OUTPATIENT)
Dept: FAMILY MEDICINE | Facility: CLINIC | Age: 88
End: 2023-10-31
Payer: MEDICARE

## 2023-10-31 ENCOUNTER — LABORATORY RESULT (OUTPATIENT)
Age: 88
End: 2023-10-31

## 2023-10-31 VITALS — SYSTOLIC BLOOD PRESSURE: 130 MMHG | DIASTOLIC BLOOD PRESSURE: 60 MMHG

## 2023-10-31 VITALS
DIASTOLIC BLOOD PRESSURE: 73 MMHG | BODY MASS INDEX: 26.2 KG/M2 | HEIGHT: 66 IN | RESPIRATION RATE: 16 BRPM | SYSTOLIC BLOOD PRESSURE: 155 MMHG | WEIGHT: 163 LBS | HEART RATE: 57 BPM | OXYGEN SATURATION: 99 % | TEMPERATURE: 97.9 F

## 2023-10-31 PROCEDURE — 90662 IIV NO PRSV INCREASED AG IM: CPT

## 2023-10-31 PROCEDURE — G0008: CPT

## 2023-10-31 PROCEDURE — 36415 COLL VENOUS BLD VENIPUNCTURE: CPT

## 2023-10-31 PROCEDURE — 99214 OFFICE O/P EST MOD 30 MIN: CPT | Mod: 25

## 2023-10-31 NOTE — ED ADULT TRIAGE NOTE - IDEAL BODY WEIGHT(KG)
Emergency Department TeleTriage Encounter Note      CHIEF COMPLAINT    Chief Complaint   Patient presents with    Vomiting     N/V/D and weakness onset today with body aches and chills. Temp of 102 earlier, took Tylenol PTA.        VITAL SIGNS   Initial Vitals [10/30/23 1900]   BP Pulse Resp Temp SpO2   135/88 98 20 98.5 °F (36.9 °C) 98 %      MAP       --            ALLERGIES    Review of patient's allergies indicates:   Allergen Reactions    Bactrim [sulfamethoxazole-trimethoprim] Hives       PROVIDER TRIAGE NOTE  Verbal consent for the teletriage evaluation was given by the patient at the start of the evaluation.  All efforts will be made to maintain patient's privacy during the evaluation.      This is a teletriage evaluation of a 36 y.o. female presenting to the ED with c/o fatigue, diarrhea, fever, and body aches that started today.  Had tylenol PTA with minimal relief. Limited physical exam via telehealth: The patient is awake, alert, answering questions appropriately and is not in respiratory distress.  As the Teletriage provider, I performed an initial assessment and ordered appropriate labs and imaging studies, if any, to facilitate the patient's care once placed in the ED. Once a room is available, care and a full evaluation will be completed by an alternate ED provider.  Any additional orders and the final disposition will be determined by that provider.  All imaging and labs will not be followed-up by the Teletriage Team, including myself.          ORDERS  Labs Reviewed   POCT URINE PREGNANCY   SARS-COV-2 RDRP GENE   POCT INFLUENZA A/B MOLECULAR       ED Orders (720h ago, onward)      Start Ordered     Status Ordering Provider    10/30/23 1914 10/30/23 1913  POCT urine pregnancy  Once         Ordered FELICITAS ZAMBRANO    10/30/23 1914 10/30/23 1913  POCT COVID-19 Rapid Screening  Once         Ordered FELICITAS ZAMBRANO    10/30/23 1914 10/30/23 1913  POCT Influenza A/B Molecular  Once         Ordered KENYA  FELICITAS GOOD              Virtual Visit Note: The provider triage portion of this emergency department evaluation and documentation was performed via [employer], a HIPAA-compliant telemedicine application, in concert with a tele-presenter in the room. A face to face patient evaluation with one of my colleagues will occur once the patient is placed in an emergency department room.      DISCLAIMER: This note was prepared with [employer] voice recognition transcription software. Garbled syntax, mangled pronouns, and other bizarre constructions may be attributed to that software system.     62

## 2023-11-09 LAB
ALBUMIN SERPL ELPH-MCNC: 4.1 G/DL
ALP BLD-CCNC: 93 U/L
ALT SERPL-CCNC: 26 U/L
ANION GAP SERPL CALC-SCNC: 10 MMOL/L
AST SERPL-CCNC: 30 U/L
BASOPHILS # BLD AUTO: 0.06 K/UL
BASOPHILS NFR BLD AUTO: 1 %
BILIRUB SERPL-MCNC: 0.7 MG/DL
BUN SERPL-MCNC: 31 MG/DL
CALCIUM SERPL-MCNC: 9.4 MG/DL
CHLORIDE SERPL-SCNC: 108 MMOL/L
CO2 SERPL-SCNC: 25 MMOL/L
CREAT SERPL-MCNC: 1.94 MG/DL
EGFR: 32 ML/MIN/1.73M2
EOSINOPHIL # BLD AUTO: 0.32 K/UL
EOSINOPHIL NFR BLD AUTO: 5.5 %
ESTIMATED AVERAGE GLUCOSE: 126 MG/DL
FERRITIN SERPL-MCNC: 489 NG/ML
GLUCOSE SERPL-MCNC: 101 MG/DL
HBA1C MFR BLD HPLC: 6 %
HCT VFR BLD CALC: 37.9 %
HGB BLD-MCNC: 12 G/DL
IMM GRANULOCYTES NFR BLD AUTO: 0.5 %
IRON SATN MFR SERPL: 32 %
IRON SERPL-MCNC: 74 UG/DL
LYMPHOCYTES # BLD AUTO: 0.95 K/UL
LYMPHOCYTES NFR BLD AUTO: 16.3 %
MAN DIFF?: NORMAL
MCHC RBC-ENTMCNC: 31.7 GM/DL
MCHC RBC-ENTMCNC: 31.7 PG
MCV RBC AUTO: 100.3 FL
MONOCYTES # BLD AUTO: 0.47 K/UL
MONOCYTES NFR BLD AUTO: 8.1 %
NEUTROPHILS # BLD AUTO: 3.99 K/UL
NEUTROPHILS NFR BLD AUTO: 68.6 %
PLATELET # BLD AUTO: 160 K/UL
POTASSIUM SERPL-SCNC: 5 MMOL/L
PROT SERPL-MCNC: 6 G/DL
RBC # BLD: 3.78 M/UL
RBC # FLD: 15.3 %
SODIUM SERPL-SCNC: 143 MMOL/L
TIBC SERPL-MCNC: 235 UG/DL
TSH SERPL-ACNC: 15.5 UIU/ML
UIBC SERPL-MCNC: 161 UG/DL
WBC # FLD AUTO: 5.82 K/UL

## 2023-12-04 ENCOUNTER — APPOINTMENT (OUTPATIENT)
Dept: CARDIOLOGY | Facility: CLINIC | Age: 88
End: 2023-12-04
Payer: MEDICARE

## 2023-12-04 ENCOUNTER — NON-APPOINTMENT (OUTPATIENT)
Age: 88
End: 2023-12-04

## 2023-12-04 VITALS
HEIGHT: 66 IN | SYSTOLIC BLOOD PRESSURE: 150 MMHG | HEART RATE: 60 BPM | DIASTOLIC BLOOD PRESSURE: 68 MMHG | WEIGHT: 163 LBS | BODY MASS INDEX: 26.2 KG/M2 | OXYGEN SATURATION: 99 %

## 2023-12-04 PROCEDURE — 93306 TTE W/DOPPLER COMPLETE: CPT

## 2023-12-04 PROCEDURE — 99214 OFFICE O/P EST MOD 30 MIN: CPT | Mod: 25

## 2023-12-04 PROCEDURE — 93000 ELECTROCARDIOGRAM COMPLETE: CPT

## 2023-12-12 ENCOUNTER — APPOINTMENT (OUTPATIENT)
Dept: NEPHROLOGY | Facility: CLINIC | Age: 88
End: 2023-12-12
Payer: MEDICARE

## 2023-12-12 VITALS
WEIGHT: 161 LBS | HEART RATE: 59 BPM | OXYGEN SATURATION: 99 % | HEIGHT: 66 IN | DIASTOLIC BLOOD PRESSURE: 58 MMHG | BODY MASS INDEX: 25.88 KG/M2 | SYSTOLIC BLOOD PRESSURE: 138 MMHG

## 2023-12-12 PROCEDURE — 99214 OFFICE O/P EST MOD 30 MIN: CPT

## 2023-12-26 ENCOUNTER — NON-APPOINTMENT (OUTPATIENT)
Age: 88
End: 2023-12-26

## 2023-12-26 ENCOUNTER — APPOINTMENT (OUTPATIENT)
Dept: ELECTROPHYSIOLOGY | Facility: CLINIC | Age: 88
End: 2023-12-26
Payer: MEDICARE

## 2023-12-26 PROCEDURE — 93296 REM INTERROG EVL PM/IDS: CPT

## 2023-12-26 PROCEDURE — 93294 REM INTERROG EVL PM/LDLS PM: CPT

## 2024-01-30 ENCOUNTER — APPOINTMENT (OUTPATIENT)
Dept: FAMILY MEDICINE | Facility: CLINIC | Age: 89
End: 2024-01-30
Payer: MEDICARE

## 2024-01-30 VITALS
DIASTOLIC BLOOD PRESSURE: 68 MMHG | TEMPERATURE: 97.8 F | RESPIRATION RATE: 16 BRPM | SYSTOLIC BLOOD PRESSURE: 154 MMHG | BODY MASS INDEX: 25.88 KG/M2 | HEIGHT: 66 IN | OXYGEN SATURATION: 99 % | WEIGHT: 161 LBS | HEART RATE: 60 BPM

## 2024-01-30 VITALS — SYSTOLIC BLOOD PRESSURE: 138 MMHG | DIASTOLIC BLOOD PRESSURE: 66 MMHG

## 2024-01-30 DIAGNOSIS — C44.41 BASAL CELL CARCINOMA OF SKIN OF SCALP AND NECK: ICD-10-CM

## 2024-01-30 PROCEDURE — 99214 OFFICE O/P EST MOD 30 MIN: CPT

## 2024-01-30 NOTE — ASSESSMENT
[FreeTextEntry1] : Hypothyroid on levothyroxine 25 mcg p.o. daily Check labs  CKD stable. Patient seen nephrology Muriel 12/23 CKD III in the setting of long standing HTN, partial nephrectomy Creatinine 1.94 in october UACR <30  Anemia CKD and iron def on iron Continue iron Labs in chart 5/23  Hypertension: Blood pressure okay, On metoprolol succinate 25 mg.  HLD On Atorvastatin 10 mg qd Check labs  Atrial fibrillation: Seen by cardiology 6/2023 on metoprolol 50 mg qd, amiodarone and Eliquis 2.5 mg twice daily. Dr Marquez PVD seeing cardiologist  Follow up in 3 months On Levothyroxine 25 mcg PO qd Checvk labs.

## 2024-01-30 NOTE — PHYSICAL EXAM
[Normal Sclera/Conjunctiva] : normal sclera/conjunctiva [Supple] : supple [Normal Rate] : normal rate  [Normal S1, S2] : normal S1 and S2 [Pedal Pulses Present] : the pedal pulses are present [No Edema] : there was no peripheral edema [No CVA Tenderness] : no CVA  tenderness [No Spinal Tenderness] : no spinal tenderness [No Rash] : no rash [Normal] : affect was normal and insight and judgment were intact [de-identified] : irregular
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
## 2024-02-02 ENCOUNTER — TRANSCRIPTION ENCOUNTER (OUTPATIENT)
Age: 89
End: 2024-02-02

## 2024-02-02 LAB
25(OH)D3 SERPL-MCNC: 45.4 NG/ML
ALBUMIN SERPL ELPH-MCNC: 4.2 G/DL
ALP BLD-CCNC: 115 U/L
ALT SERPL-CCNC: 28 U/L
ANION GAP SERPL CALC-SCNC: 10 MMOL/L
AST SERPL-CCNC: 32 U/L
BILIRUB SERPL-MCNC: 0.8 MG/DL
BUN SERPL-MCNC: 25 MG/DL
CALCIUM SERPL-MCNC: 9.3 MG/DL
CHLORIDE SERPL-SCNC: 104 MMOL/L
CO2 SERPL-SCNC: 26 MMOL/L
CREAT SERPL-MCNC: 2.05 MG/DL
EGFR: 30 ML/MIN/1.73M2
GLUCOSE SERPL-MCNC: 98 MG/DL
MAGNESIUM SERPL-MCNC: 2.3 MG/DL
PHOSPHATE SERPL-MCNC: 4.1 MG/DL
POTASSIUM SERPL-SCNC: 5.2 MMOL/L
PROT SERPL-MCNC: 6.3 G/DL
SODIUM SERPL-SCNC: 140 MMOL/L
T4 FREE SERPL-MCNC: 1.1 NG/DL
TSH SERPL-ACNC: 10.9 UIU/ML

## 2024-02-26 ENCOUNTER — EMERGENCY (EMERGENCY)
Facility: HOSPITAL | Age: 89
LOS: 0 days | Discharge: HOME | End: 2024-02-26
Attending: EMERGENCY MEDICINE
Payer: COMMERCIAL

## 2024-02-26 VITALS
DIASTOLIC BLOOD PRESSURE: 60 MMHG | TEMPERATURE: 98 F | RESPIRATION RATE: 18 BRPM | SYSTOLIC BLOOD PRESSURE: 128 MMHG | HEART RATE: 60 BPM | OXYGEN SATURATION: 100 %

## 2024-02-26 VITALS
HEIGHT: 65 IN | WEIGHT: 149.91 LBS | DIASTOLIC BLOOD PRESSURE: 62 MMHG | OXYGEN SATURATION: 100 % | RESPIRATION RATE: 18 BRPM | SYSTOLIC BLOOD PRESSURE: 138 MMHG | HEART RATE: 60 BPM | TEMPERATURE: 98 F

## 2024-02-26 DIAGNOSIS — Z79.01 LONG TERM (CURRENT) USE OF ANTICOAGULANTS: ICD-10-CM

## 2024-02-26 DIAGNOSIS — D63.1 ANEMIA IN CHRONIC KIDNEY DISEASE: ICD-10-CM

## 2024-02-26 DIAGNOSIS — R07.89 OTHER CHEST PAIN: ICD-10-CM

## 2024-02-26 DIAGNOSIS — R79.89 OTHER SPECIFIED ABNORMAL FINDINGS OF BLOOD CHEMISTRY: ICD-10-CM

## 2024-02-26 DIAGNOSIS — I13.0 HYPERTENSIVE HEART AND CHRONIC KIDNEY DISEASE WITH HEART FAILURE AND STAGE 1 THROUGH STAGE 4 CHRONIC KIDNEY DISEASE, OR UNSPECIFIED CHRONIC KIDNEY DISEASE: ICD-10-CM

## 2024-02-26 DIAGNOSIS — N18.9 CHRONIC KIDNEY DISEASE, UNSPECIFIED: ICD-10-CM

## 2024-02-26 DIAGNOSIS — Z88.0 ALLERGY STATUS TO PENICILLIN: ICD-10-CM

## 2024-02-26 DIAGNOSIS — E11.22 TYPE 2 DIABETES MELLITUS WITH DIABETIC CHRONIC KIDNEY DISEASE: ICD-10-CM

## 2024-02-26 DIAGNOSIS — Z91.013 ALLERGY TO SEAFOOD: ICD-10-CM

## 2024-02-26 DIAGNOSIS — Z95.0 PRESENCE OF CARDIAC PACEMAKER: Chronic | ICD-10-CM

## 2024-02-26 DIAGNOSIS — I50.9 HEART FAILURE, UNSPECIFIED: ICD-10-CM

## 2024-02-26 DIAGNOSIS — I48.91 UNSPECIFIED ATRIAL FIBRILLATION: ICD-10-CM

## 2024-02-26 DIAGNOSIS — R51.9 HEADACHE, UNSPECIFIED: ICD-10-CM

## 2024-02-26 DIAGNOSIS — N40.0 BENIGN PROSTATIC HYPERPLASIA WITHOUT LOWER URINARY TRACT SYMPTOMS: ICD-10-CM

## 2024-02-26 LAB
ABO RH CONFIRMATION: SIGNIFICANT CHANGE UP
ADD ON TEST-SPECIMEN IN LAB: SIGNIFICANT CHANGE UP
ALBUMIN SERPL ELPH-MCNC: 3.4 G/DL — SIGNIFICANT CHANGE UP (ref 3.3–5)
ALP SERPL-CCNC: 112 U/L — SIGNIFICANT CHANGE UP (ref 40–120)
ALT FLD-CCNC: 65 U/L — SIGNIFICANT CHANGE UP (ref 12–78)
ANION GAP SERPL CALC-SCNC: 3 MMOL/L — LOW (ref 5–17)
APTT BLD: 42.1 SEC — HIGH (ref 24.5–35.6)
AST SERPL-CCNC: 52 U/L — HIGH (ref 15–37)
BASOPHILS # BLD AUTO: 0.06 K/UL — SIGNIFICANT CHANGE UP (ref 0–0.2)
BASOPHILS NFR BLD AUTO: 1.1 % — SIGNIFICANT CHANGE UP (ref 0–2)
BILIRUB SERPL-MCNC: 0.7 MG/DL — SIGNIFICANT CHANGE UP (ref 0.2–1.2)
BLD GP AB SCN SERPL QL: SIGNIFICANT CHANGE UP
BUN SERPL-MCNC: 30 MG/DL — HIGH (ref 7–23)
CALCIUM SERPL-MCNC: 8.8 MG/DL — SIGNIFICANT CHANGE UP (ref 8.5–10.1)
CHLORIDE SERPL-SCNC: 113 MMOL/L — HIGH (ref 96–108)
CO2 SERPL-SCNC: 27 MMOL/L — SIGNIFICANT CHANGE UP (ref 22–31)
CREAT SERPL-MCNC: 1.93 MG/DL — HIGH (ref 0.5–1.3)
EGFR: 32 ML/MIN/1.73M2 — LOW
EOSINOPHIL # BLD AUTO: 0.28 K/UL — SIGNIFICANT CHANGE UP (ref 0–0.5)
EOSINOPHIL NFR BLD AUTO: 5.4 % — SIGNIFICANT CHANGE UP (ref 0–6)
GLUCOSE SERPL-MCNC: 107 MG/DL — HIGH (ref 70–99)
HCT VFR BLD CALC: 34.8 % — LOW (ref 39–50)
HGB BLD-MCNC: 11.4 G/DL — LOW (ref 13–17)
IMM GRANULOCYTES NFR BLD AUTO: 0.4 % — SIGNIFICANT CHANGE UP (ref 0–0.9)
INR BLD: 1.43 RATIO — HIGH (ref 0.85–1.18)
LYMPHOCYTES # BLD AUTO: 1 K/UL — SIGNIFICANT CHANGE UP (ref 1–3.3)
LYMPHOCYTES # BLD AUTO: 19.2 % — SIGNIFICANT CHANGE UP (ref 13–44)
MCHC RBC-ENTMCNC: 31.9 PG — SIGNIFICANT CHANGE UP (ref 27–34)
MCHC RBC-ENTMCNC: 32.8 GM/DL — SIGNIFICANT CHANGE UP (ref 32–36)
MCV RBC AUTO: 97.5 FL — SIGNIFICANT CHANGE UP (ref 80–100)
MONOCYTES # BLD AUTO: 0.45 K/UL — SIGNIFICANT CHANGE UP (ref 0–0.9)
MONOCYTES NFR BLD AUTO: 8.6 % — SIGNIFICANT CHANGE UP (ref 2–14)
NEUTROPHILS # BLD AUTO: 3.41 K/UL — SIGNIFICANT CHANGE UP (ref 1.8–7.4)
NEUTROPHILS NFR BLD AUTO: 65.3 % — SIGNIFICANT CHANGE UP (ref 43–77)
NT-PROBNP SERPL-SCNC: 3345 PG/ML — HIGH (ref 0–450)
PLATELET # BLD AUTO: 160 K/UL — SIGNIFICANT CHANGE UP (ref 150–400)
POTASSIUM SERPL-MCNC: 5 MMOL/L — SIGNIFICANT CHANGE UP (ref 3.5–5.3)
POTASSIUM SERPL-SCNC: 5 MMOL/L — SIGNIFICANT CHANGE UP (ref 3.5–5.3)
PROT SERPL-MCNC: 6.6 GM/DL — SIGNIFICANT CHANGE UP (ref 6–8.3)
PROTHROM AB SERPL-ACNC: 16 SEC — HIGH (ref 9.5–13)
RBC # BLD: 3.57 M/UL — LOW (ref 4.2–5.8)
RBC # FLD: 15 % — HIGH (ref 10.3–14.5)
SODIUM SERPL-SCNC: 143 MMOL/L — SIGNIFICANT CHANGE UP (ref 135–145)
TROPONIN I, HIGH SENSITIVITY RESULT: 14.86 NG/L — SIGNIFICANT CHANGE UP
WBC # BLD: 5.22 K/UL — SIGNIFICANT CHANGE UP (ref 3.8–10.5)
WBC # FLD AUTO: 5.22 K/UL — SIGNIFICANT CHANGE UP (ref 3.8–10.5)

## 2024-02-26 PROCEDURE — 72125 CT NECK SPINE W/O DYE: CPT | Mod: MC

## 2024-02-26 PROCEDURE — 93010 ELECTROCARDIOGRAM REPORT: CPT

## 2024-02-26 PROCEDURE — 71250 CT THORAX DX C-: CPT | Mod: 26,MC

## 2024-02-26 PROCEDURE — 70450 CT HEAD/BRAIN W/O DYE: CPT | Mod: MC

## 2024-02-26 PROCEDURE — 93005 ELECTROCARDIOGRAM TRACING: CPT

## 2024-02-26 PROCEDURE — 99285 EMERGENCY DEPT VISIT HI MDM: CPT | Mod: 25

## 2024-02-26 PROCEDURE — 85730 THROMBOPLASTIN TIME PARTIAL: CPT

## 2024-02-26 PROCEDURE — 72125 CT NECK SPINE W/O DYE: CPT | Mod: 26,MC

## 2024-02-26 PROCEDURE — 70450 CT HEAD/BRAIN W/O DYE: CPT | Mod: 26,MC

## 2024-02-26 PROCEDURE — 85025 COMPLETE CBC W/AUTO DIFF WBC: CPT

## 2024-02-26 PROCEDURE — 83880 ASSAY OF NATRIURETIC PEPTIDE: CPT

## 2024-02-26 PROCEDURE — 36415 COLL VENOUS BLD VENIPUNCTURE: CPT

## 2024-02-26 PROCEDURE — 80053 COMPREHEN METABOLIC PANEL: CPT

## 2024-02-26 PROCEDURE — 86900 BLOOD TYPING SEROLOGIC ABO: CPT

## 2024-02-26 PROCEDURE — 86850 RBC ANTIBODY SCREEN: CPT

## 2024-02-26 PROCEDURE — 74176 CT ABD & PELVIS W/O CONTRAST: CPT | Mod: MC

## 2024-02-26 PROCEDURE — 99285 EMERGENCY DEPT VISIT HI MDM: CPT

## 2024-02-26 PROCEDURE — 84484 ASSAY OF TROPONIN QUANT: CPT

## 2024-02-26 PROCEDURE — 85610 PROTHROMBIN TIME: CPT

## 2024-02-26 PROCEDURE — 71250 CT THORAX DX C-: CPT | Mod: MC

## 2024-02-26 PROCEDURE — 74176 CT ABD & PELVIS W/O CONTRAST: CPT | Mod: 26,MC

## 2024-02-26 PROCEDURE — 86901 BLOOD TYPING SEROLOGIC RH(D): CPT

## 2024-02-26 RX ORDER — FUROSEMIDE 40 MG
20 TABLET ORAL ONCE
Refills: 0 | Status: COMPLETED | OUTPATIENT
Start: 2024-02-26 | End: 2024-02-26

## 2024-02-26 RX ORDER — ACETAMINOPHEN 500 MG
1000 TABLET ORAL ONCE
Refills: 0 | Status: COMPLETED | OUTPATIENT
Start: 2024-02-26 | End: 2024-02-26

## 2024-02-26 RX ORDER — FUROSEMIDE 40 MG
40 TABLET ORAL ONCE
Refills: 0 | Status: DISCONTINUED | OUTPATIENT
Start: 2024-02-26 | End: 2024-02-26

## 2024-02-26 RX ADMIN — Medication 20 MILLIGRAM(S): at 22:59

## 2024-02-26 NOTE — ED PROVIDER NOTE - PATIENT PORTAL LINK FT
You can access the FollowMyHealth Patient Portal offered by [facility] by registering at the following website: [email or web address]. By joining [employer]’s FollowMyHealth portal, you will also be able to view your health information using other applications (apps) compatible with our system.

## 2024-02-26 NOTE — ED PROVIDER NOTE - NSFOLLOWUPINSTRUCTIONS_ED_ALL_ED_FT
Heart Failure    WHAT YOU NEED TO KNOW:    What is heart failure? Heart failure is a condition that does not allow your heart to fill or pump properly. Your heart cannot pump enough oxygen in your blood to your organs and tissues. Fluid may not move through your body properly. Fluid may build up and cause swelling and trouble breathing. This is known as congestive heart failure. Heart failure is a long-term condition that tends to get worse over time. It is important to manage your health to improve your quality of life.  Heart Failure    What are the signs and symptoms of heart failure? Signs and symptoms depend on the type of heart failure you have and how severe it is. You may have any of the following:    Trouble breathing with activity that worsens to trouble breathing at rest    Shortness of breath while lying flat    Severe shortness of breath and coughing at night that usually wakes you    Feeling lightheaded when you stand up    Purple color around your mouth and nails    Confusion or anxiety    Chest pain at night    Periods of no breathing, then breathing fast    Lack of energy (often worsened by physical activity), or trouble sleeping    Swelling in your ankles, legs, or abdomen    Heartbeat that is fast or not regular    Fingers and toes feel cool to the touch  How is heart failure diagnosed? Tell your healthcare provider about your health history and the medicines you take. Tell him or her if you have a family history of heart failure or cardiomyopathy. He or she will ask about your shortness of breath and other symptoms. You may need any of the following:    Blood tests are used to check for heart problems such as coronary artery disease or decreased blood flow. Blood tests also give healthcare providers information about your kidney, liver, and thyroid function. The results can also show an infection.    An EKG test records your heart rhythm and how fast your heart beats. It shows healthcare providers if you have heart block or have had a heart attack.    Echocardiogram is a type of ultrasound. Sound waves are used to show the structure and function of your heart. This test may show if there are problems with your heart valves. It may also show if the chambers of your heart are working properly.    X-ray, CT, or MRI pictures may be taken of your heart and lungs. The pictures may show the cause of your heart failure, or blood clots or fluid in your lungs. You may be given contrast liquid to help your heart show up better in the pictures. Tell the healthcare provider if you have ever had an allergic reaction to contrast liquid. Do not enter the MRI room with anything metal. Metal can cause serious injury. Tell the provider if you have any metal in or on your body.  How is heart failure treated? Heart failure is often caused by damage or injury to your heart. The damage may be caused by other heart problems, diabetes, or high blood pressure. The damage may have also been caused by an infection. Your healthcare providers will help you manage any other health conditions that may be causing your heart failure. The goals of treatment are to manage, slow, or reverse heart damage. Treatment may include the following:    Medicines may be given to help regulate your heart rhythm and lower your blood pressure. You may also need medicines to help decrease extra fluids. Medicines, such as NSAIDs, may be stopped if they are causing your heart failure to become worse. Do not stop any of your medicines on your own.    Cardiac rehab is a program run by specialists who will help you safely strengthen your heart. In the program you will learn about exercise, relaxation, stress management, and heart-healthy nutrition. Cardiac rehab may be recommended if your heart failure is not severe.    Oxygen may help you breathe easier if your oxygen level is lower than normal. A CPAP machine may be used to keep your airway open while you sleep.  CPAP      Surgery can be done to implant a pacemaker or another device in your chest to regulate your heart rhythm. Other types of surgery can open blocked heart vessels, replace a damaged heart valve, or remove scar tissue.  What can I do to manage swelling from extra fluid?    Elevate (raise) your legs above the level of your heart. This will help with fluid that builds up in your legs or ankles. Elevate your legs as often as possible during the day. Prop your legs on pillows or blankets to keep them elevated comfortably. Try not to stand for long periods of time during the day. Move around to keep your blood circulating.  Elevate Leg      Limit sodium (salt). Ask how much sodium you can have each day. Your healthcare provider may give you a limit, such as 2,300 milligrams (mg) a day. Your provider or a dietitian can teach you how to read food labels for the number of mg in a food. He or she can also help you find ways to have less salt. For example, if you add salt to food as you cook, do not add more at the table.        Drink liquids as directed. You may need to limit the amount of liquid you drink within 24 hours. Your healthcare provider will tell you how much liquid to have and which liquids are best for you. He or she may tell you to limit liquid to 1.5 to 2 liters in a day. He or she will also tell you how often to drink liquid throughout the day.    Weigh yourself every morning. Use the same scale, in the same spot. Do this after you use the bathroom, but before you eat or drink. Wear the same type of clothing each time. Write down your weight and call your healthcare provider if you have a sudden weight gain. Swelling and weight gain are signs of fluid buildup.  Weight Checks DIMA  What can I do to manage heart failure? Your quality of life may improve with treatment and the following:    Do not smoke. Nicotine and other chemicals in cigarettes and cigars can cause lung and heart damage. Ask your healthcare provider for information if you currently smoke and need help to quit. E-cigarettes or smokeless tobacco still contain nicotine. Talk to your healthcare provider before you use these products.    Do not drink alcohol or use illegal drugs. Alcohol and drugs can increase your risk for high blood pressure, diabetes, and coronary artery disease.    Eat heart-healthy foods. Heart-healthy foods include fruits, vegetables, lean meat (such as beef, chicken, or pork), and low-fat dairy products. Fatty fish such as salmon and tuna are also heart healthy. Other heart-healthy foods include walnuts, whole-grain breads, and legumes such as horne beans. Replace butter and margarine with heart-healthy oils such as olive oil or canola oil. Your provider or a dietitian can help you create heart-healthy meal plans.        Manage any chronic health conditions you have. These include high blood pressure, diabetes, obesity, high cholesterol, metabolic syndrome, and COPD. You will have fewer symptoms if you manage these health conditions. Follow your healthcare provider's recommendations and follow up with him or her regularly.    Maintain a healthy weight. Being overweight can increase your risk for high blood pressure, diabetes, and coronary artery disease. These conditions can make your symptoms worse. Ask your healthcare provider what a healthy weight is for you. Ask him or her to help you create a weight loss plan, if needed.    Stay active. Activity can help keep your symptoms from getting worse. Walking is a type of physical activity that helps maintain your strength and improve your mood. Physical activity also helps you manage your weight. Work with your healthcare provider to create an exercise plan that is right for you.  [facility] Walking for Exercise      Get vaccines as directed. The flu, COVID-19, and pneumonia can be severe for a person who has heart failure. Vaccines protect you from these infections. Get a flu vaccine every year as soon as it is recommended, usually in September or October. Get a COVID-19 vaccine and booster as directed. You may also need the pneumonia vaccine. Your healthcare provider can tell you if you need other vaccines, and when to get them.  Prevent Heart Disease   Where can I get support or more information? Heart failure can be difficult to manage. It may be helpful to talk with others who have heart failure. You may learn how to better manage your condition or get emotional support. For more information:    American Heart Association  [address] [place] [id number]  Phone: 1-841.777.1078  Web Address: http://www.heart.org  Call your local emergency number (911 in the ) if:    You have any of the following signs of a heart attack:  Squeezing, pressure, or pain in your chest    You may also have any of the following:  Discomfort or pain in your back, neck, jaw, stomach, or arm    Shortness of breath    Nausea or vomiting    Lightheadedness or a sudden cold sweat    When should I seek immediate care?    Your heartbeat is fast, slow, or uneven all the time.    When should I call my doctor?    You have symptoms of worsening heart failure:  Shortness of breath at rest, at night, or that is getting worse in any way    Weight gain of 3 or more pounds (1.4 kg) in a day, or more than your healthcare provider says is okay    More swelling in your legs or ankles    Abdominal pain or swelling    More coughing    Feeling tired all the time    You feel depressed, or you have lost interest in things you used to enjoy.    You often feel worried or afraid.    You have questions or concerns about your condition or care.  CARE AGREEMENT:    You have the right to help plan your care. Learn about your health condition and how it may be treated. Discuss treatment options with your healthcare providers to decide what care you want to receive. You always have the right to refuse treatment.    © [employer] 1973, 2024    	  back to top            © [employer] 1973, 2024

## 2024-02-26 NOTE — ED ADULT NURSE NOTE - NSFALLHARMRISKINTERV_ED_ALL_ED
Assistance OOB with selected safe patient handling equipment if applicable/Communicate risk of Fall with Harm to all staff, patient, and family/Monitor gait and stability/Provide patient with walking aids/Provide visual cue: red socks, yellow wristband, yellow gown, etc/Reinforce activity limits and safety measures with patient and family/Bed in lowest position, wheels locked, appropriate side rails in place/Call bell, personal items and telephone in reach/Instruct patient to call for assistance before getting out of bed/chair/stretcher/Non-slip footwear applied when patient is off stretcher/[place] to call system/Physically safe environment - no spills, clutter or unnecessary equipment/Purposeful Proactive Rounding/Room/bathroom lighting operational, light cord in reach
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
## 2024-02-26 NOTE — ED ADULT NURSE NOTE - CAS EDP DISCH TYPE
In preparation for your surgery please go to any pharmacy and ask the staff to help you find the surgical soap (also known as Hibiclens or chlorhexidine) which you will use. The night before use the soap (keeping it away from your eyes and private areas) dry with a clean towel and wear clean nightclothes, in a bed with clean sheets. Try to keep any pets out of the bed for that night. The morning of the surgery wash with the soap again before coming to the hospital.   Home
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
## 2024-02-26 NOTE — ED ADULT TRIAGE NOTE - MODE OF ARRIVAL
Patients O2 SATs are 81% on 6 LPM O2 via NC, called RT, they placed her on 15 LM HIGHFLOW O2 and new SATs are 92% [facility]

## 2024-03-02 ENCOUNTER — EMERGENCY (EMERGENCY)
Facility: HOSPITAL | Age: 89
LOS: 0 days | Discharge: HOME | End: 2024-03-02
Attending: EMERGENCY MEDICINE
Payer: MEDICARE

## 2024-03-02 VITALS
SYSTOLIC BLOOD PRESSURE: 158 MMHG | TEMPERATURE: 98 F | RESPIRATION RATE: 18 BRPM | OXYGEN SATURATION: 98 % | DIASTOLIC BLOOD PRESSURE: 72 MMHG | HEART RATE: 65 BPM

## 2024-03-02 VITALS
OXYGEN SATURATION: 99 % | SYSTOLIC BLOOD PRESSURE: 166 MMHG | TEMPERATURE: 98 F | RESPIRATION RATE: 18 BRPM | DIASTOLIC BLOOD PRESSURE: 20 MMHG | WEIGHT: 164.91 LBS | HEIGHT: 65 IN | HEART RATE: 60 BPM

## 2024-03-02 DIAGNOSIS — I48.91 UNSPECIFIED ATRIAL FIBRILLATION: ICD-10-CM

## 2024-03-02 DIAGNOSIS — N18.9 CHRONIC KIDNEY DISEASE, UNSPECIFIED: ICD-10-CM

## 2024-03-02 DIAGNOSIS — I12.9 HYPERTENSIVE CHRONIC KIDNEY DISEASE WITH STAGE 1 THROUGH STAGE 4 CHRONIC KIDNEY DISEASE, OR UNSPECIFIED CHRONIC KIDNEY DISEASE: ICD-10-CM

## 2024-03-02 DIAGNOSIS — N40.0 BENIGN PROSTATIC HYPERPLASIA WITHOUT LOWER URINARY TRACT SYMPTOMS: ICD-10-CM

## 2024-03-02 DIAGNOSIS — Z91.013 ALLERGY TO SEAFOOD: ICD-10-CM

## 2024-03-02 DIAGNOSIS — Z23 ENCOUNTER FOR IMMUNIZATION: ICD-10-CM

## 2024-03-02 DIAGNOSIS — S70.311A ABRASION, RIGHT THIGH, INITIAL ENCOUNTER: ICD-10-CM

## 2024-03-02 DIAGNOSIS — Z79.01 LONG TERM (CURRENT) USE OF ANTICOAGULANTS: ICD-10-CM

## 2024-03-02 DIAGNOSIS — Z95.0 PRESENCE OF CARDIAC PACEMAKER: ICD-10-CM

## 2024-03-02 DIAGNOSIS — Z88.0 ALLERGY STATUS TO PENICILLIN: ICD-10-CM
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
## 2024-03-02 DIAGNOSIS — S00.81XA ABRASION OF OTHER PART OF HEAD, INITIAL ENCOUNTER: ICD-10-CM
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
## 2024-03-02 DIAGNOSIS — E11.22 TYPE 2 DIABETES MELLITUS WITH DIABETIC CHRONIC KIDNEY DISEASE: ICD-10-CM

## 2024-03-02 DIAGNOSIS — M79.651 PAIN IN RIGHT THIGH: ICD-10-CM
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
## 2024-03-02 DIAGNOSIS — Z95.0 PRESENCE OF CARDIAC PACEMAKER: Chronic | ICD-10-CM

## 2024-03-02 PROCEDURE — 90471 IMMUNIZATION ADMIN: CPT

## 2024-03-02 PROCEDURE — 70450 CT HEAD/BRAIN W/O DYE: CPT | Mod: 26,MC

## 2024-03-02 PROCEDURE — 90715 TDAP VACCINE 7 YRS/> IM: CPT

## 2024-03-02 PROCEDURE — 70450 CT HEAD/BRAIN W/O DYE: CPT | Mod: MC

## 2024-03-02 PROCEDURE — 99284 EMERGENCY DEPT VISIT MOD MDM: CPT

## 2024-03-02 PROCEDURE — 99284 EMERGENCY DEPT VISIT MOD MDM: CPT | Mod: 25

## 2024-03-02 RX ORDER — TETANUS TOXOID, REDUCED DIPHTHERIA TOXOID AND ACELLULAR PERTUSSIS VACCINE, ADSORBED 5; 2.5; 8; 8; 2.5 [IU]/.5ML; [IU]/.5ML; UG/.5ML; UG/.5ML; UG/.5ML
0.5 SUSPENSION INTRAMUSCULAR ONCE
Refills: 0 | Status: COMPLETED | OUTPATIENT
Start: 2024-03-02 | End: 2024-03-02

## 2024-03-02 RX ADMIN — TETANUS TOXOID, REDUCED DIPHTHERIA TOXOID AND ACELLULAR PERTUSSIS VACCINE, ADSORBED 0.5 MILLILITER(S): 5; 2.5; 8; 8; 2.5 SUSPENSION INTRAMUSCULAR at 21:28
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
## 2024-03-02 NOTE — ED PROVIDER NOTE - PHYSICAL EXAMINATION
Constitutional: NAD AOx3  Eyes: PERRL EOMI  Head: Normocephalic atraumatic  Mouth: MMM  Cardiac: regular rate and rhythm  Resp: Lungs CTAB  GI: Abd s/nd/nt  Neuro: CN2-12 grossly intact, LOZANO x 4. C-spine nontender.  Skin: +Abrasions to the R forehead, R maxillary face, and the R lateral thigh.

## 2024-03-02 NOTE — ED PROVIDER NOTE - PATIENT PORTAL LINK FT
You can access the FollowMyHealth Patient Portal offered by [facility] by registering at the following website: [email or web address]. By joining [employer]’s FollowMyHealth portal, you will also be able to view your health information using other applications (apps) compatible with our system.
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
## 2024-03-02 NOTE — ED ADULT TRIAGE NOTE - BANDS:
[Follow-Up] : a follow-up visit [Shortness of Breath] : shortness of breath [Pulmonary Nodules] : pulmonary nodules [TextBox_44] : CHF Allergy;

## 2024-03-02 NOTE — ED PROVIDER NOTE - NS ED SCRIBE STATEMENT
Attending Cephalexin Pregnancy And Lactation Text: This medication is Pregnancy Category B and considered safe during pregnancy.  It is also excreted in breast milk but can be used safely for shorter doses.
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
## 2024-03-02 NOTE — ED PROVIDER NOTE - NSFOLLOWUPINSTRUCTIONS_ED_ALL_ED_FT
Keep wounds clean and dry after showering.     Fall Prevention    WHAT YOU NEED TO KNOW:    Fall prevention includes ways to make your home and other areas safer. It also includes ways you can move more carefully to prevent a fall. Health conditions that cause changes in your blood pressure, vision, or muscle strength and coordination may increase your risk for falls. Medicines may also increase your risk for falls if they make you dizzy, weak, or sleepy.     DISCHARGE INSTRUCTIONS:    Call 911 or have someone else call if:     You have fallen and are unconscious.      You have fallen and cannot move part of your body.    Contact your healthcare provider if:     You have fallen and have pain or a headache.      You have questions or concerns about your condition or care.    Fall prevention tips:     Stand or sit up slowly. This may help you keep your balance and prevent falls.      Use assistive devices as directed. Your healthcare provider may suggest that you use a cane or walker to help you keep your balance. You may need to have grab bars put in your bathroom near the toilet or in the shower.      Wear shoes that fit well and have soles that . Wear shoes both inside and outside. Use slippers with good . Do not wear shoes with high heels.      Wear a personal alarm. This is a device that allows you to call 911 if you fall and need help. Ask your healthcare provider for more information.      Stay active. Exercise can help strengthen your muscles and improve your balance. Your healthcare provider may recommend water aerobics or walking. He or she may also recommend physical therapy to improve your coordination. Never start an exercise program without talking to your healthcare provider first.       Manage your medical conditions. Keep all appointments with your healthcare providers. Visit your eye doctor as directed.           Home safety tips:     Add items to prevent falls in the bathroom. Put nonslip strips on your bath or shower floor to prevent you from slipping. Use a bath mat if you do not have carpet in the bathroom. This will prevent you from falling when you step out of the bath or shower. Use a shower seat so you do not need to stand while you shower. Sit on the toilet or a chair in your bathroom to dry yourself and put on clothing. This will prevent you from losing your balance from drying or dressing yourself while you are standing.       Keep paths clear. Remove books, shoes, and other objects from walkways and stairs. Place cords for telephones and lamps out of the way so that you do not need to walk over them. Tape them down if you cannot move them. Remove small rugs. If you cannot remove a rug, secure it with double-sided tape. This will prevent you from tripping.       Install bright lights in your home. Use night lights to help light paths to the bathroom or kitchen. Always turn on the light before you start walking.      Keep items you use often on shelves within reach. Do not use a step stool to help you reach an item.      Paint or place reflective tape on the edges of your stairs. This will help you see the stairs better.    Follow up with your healthcare provider as directed: Write down your questions so you remember to ask them during your visits.

## 2024-03-02 NOTE — ED PROVIDER NOTE - PROGRESS NOTE DETAILS
abrasions on left right side of face cleaned and bandaid placed. ct head without acute findings. plan for dc. Grand daughter and pt agreeable with plan. MD RAVEN

## 2024-03-02 NOTE — ED ADULT NURSE NOTE - CHIEF COMPLAINT
[facility]            (For Outpatient Use Only) Initial Admit Date: 10/23/2020   Inpt/Obs Admit Date: Inpt: 10/23/20 / Obs: N/A   Discharge Date:    Yoni Chase:  [de-identified]   MRN: [de-identified]   CSN: [id number]   CEID: [id number]        E [employer] INSURANCE   Payor:  Plan:    Group Number:  Insurance Type:    Subscriber Name:  Subscriber :    Subscriber ID:  Pt Rel to Subscriber:    Hospital Account Financial Class: Medicare Advantage    2020 The patient is a 89y Male complaining of fall.

## 2024-03-13 ENCOUNTER — APPOINTMENT (OUTPATIENT)
Dept: CARDIOLOGY | Facility: CLINIC | Age: 89
End: 2024-03-13
Payer: MEDICARE

## 2024-03-13 ENCOUNTER — NON-APPOINTMENT (OUTPATIENT)
Age: 89
End: 2024-03-13

## 2024-03-13 VITALS
WEIGHT: 161 LBS | BODY MASS INDEX: 25.88 KG/M2 | SYSTOLIC BLOOD PRESSURE: 120 MMHG | OXYGEN SATURATION: 99 % | DIASTOLIC BLOOD PRESSURE: 58 MMHG | HEIGHT: 66 IN | HEART RATE: 60 BPM

## 2024-03-13 DIAGNOSIS — Z95.0 PRESENCE OF CARDIAC PACEMAKER: ICD-10-CM

## 2024-03-13 PROCEDURE — 93000 ELECTROCARDIOGRAM COMPLETE: CPT

## 2024-03-13 PROCEDURE — 99214 OFFICE O/P EST MOD 30 MIN: CPT | Mod: 25
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
## 2024-03-13 NOTE — PHYSICAL EXAM
[Well Nourished] : well nourished [Well Developed] : well developed [No Acute Distress] : no acute distress [Normal Conjunctiva] : normal conjunctiva [Normal Venous Pressure] : normal venous pressure [No Carotid Bruit] : no carotid bruit [Normal S1, S2] : normal S1, S2 [No Murmur] : no murmur [No Rub] : no rub [Clear Lung Fields] : clear lung fields [No Gallop] : no gallop [Good Air Entry] : good air entry [No Respiratory Distress] : no respiratory distress  [Soft] : abdomen soft [Non Tender] : non-tender [No Masses/organomegaly] : no masses/organomegaly [Normal Bowel Sounds] : normal bowel sounds [Normal Gait] : normal gait [No Edema] : no edema [No Clubbing] : no clubbing [No Cyanosis] : no cyanosis [No Varicosities] : no varicosities [No Rash] : no rash [No Skin Lesions] : no skin lesions [Moves all extremities] : moves all extremities [No Focal Deficits] : no focal deficits [Normal Speech] : normal speech [Alert and Oriented] : alert and oriented [Normal memory] : normal memory [de-identified] : bruising to R side of face and RLE [de-identified] : RLE swelling with bruising

## 2024-03-16 NOTE — DISCHARGE NOTE NURSING/CASE MANAGEMENT/SOCIAL WORK - NSDCPEELIQUISREACT_GEN_ALL_CORE
2997
Apixaban/Eliquis increases your risk for bleeding. Notify your doctor if you experience any of the following side effects: bleeding, coughing or vomiting blood, red or black stool, unexpected pain or swelling, itching or hives, chest pain, chest tightness, trouble breathing, changes in how much or how often you urinate, red or pink urine, numbness or tingling in your feet, or unusual muscle weakness. When Apixaban/Eliquis is taken with other medicines, they can affect how it works. Taking other medications such as aspirin, blood thinners, nonsteroidal anti-inflammatories, and medications that treat depression can increase your risk of bleeding. It is very important to tell your health care provider about all of the other medicines, including over-the-counter medications, herbs, and vitamins you are taking. DO NOT start, stop, or change the dosage of any medicine, including over-the-counter medicines, vitamins, and herbal products without your doctor’s approval. Any products containing aspirin or are nonsteroidal anti-inflammatories lessen the blood’s ability to form clots and add to the effect of Apixaban/Eliquis. Never take aspirin or medicines that contain aspirin without speaking to your doctor.

## 2024-03-25 ENCOUNTER — NON-APPOINTMENT (OUTPATIENT)
Age: 89
End: 2024-03-25

## 2024-03-26 ENCOUNTER — APPOINTMENT (OUTPATIENT)
Dept: ELECTROPHYSIOLOGY | Facility: CLINIC | Age: 89
End: 2024-03-26
Payer: MEDICARE

## 2024-03-26 PROCEDURE — 93294 REM INTERROG EVL PM/LDLS PM: CPT

## 2024-03-26 PROCEDURE — 93296 REM INTERROG EVL PM/IDS: CPT

## 2024-03-28 NOTE — DISCHARGE NOTE NURSING/CASE MANAGEMENT/SOCIAL WORK - NSPROEXTENSIONSOFSELF_GEN_A_NUR
"     [facility] - Emergency Department  [address] [place] [address] [place] [address]  Phone: [phone]  Fax: [phone]   April 1, 2024    Patient: Priscila Thorpe (Mary-Hope)   YOB: 2023   Date of Visit: 3/28/2024   Patient ID [id number]       To Whom It May Concern:    Priscila Thorpe (Mary-Hope) was seen and treated in our emergency department on 3/28/2024. She {Return to school/sport/work:55947}.      Sincerely,          ,       " silver watch, shoes, clothes, black flip cell phone/dentures

## 2024-04-02 ENCOUNTER — APPOINTMENT (OUTPATIENT)
Dept: FAMILY MEDICINE | Facility: CLINIC | Age: 89
End: 2024-04-02
Payer: MEDICARE

## 2024-04-02 VITALS
OXYGEN SATURATION: 99 % | BODY MASS INDEX: 27.28 KG/M2 | SYSTOLIC BLOOD PRESSURE: 113 MMHG | HEART RATE: 60 BPM | RESPIRATION RATE: 16 BRPM | WEIGHT: 169 LBS | TEMPERATURE: 97.6 F | DIASTOLIC BLOOD PRESSURE: 60 MMHG

## 2024-04-02 DIAGNOSIS — R73.09 OTHER ABNORMAL GLUCOSE: ICD-10-CM

## 2024-04-02 DIAGNOSIS — I73.9 PERIPHERAL VASCULAR DISEASE, UNSPECIFIED: ICD-10-CM

## 2024-04-02 DIAGNOSIS — N18.9 CHRONIC KIDNEY DISEASE, UNSPECIFIED: ICD-10-CM
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
## 2024-04-02 PROCEDURE — 36415 COLL VENOUS BLD VENIPUNCTURE: CPT

## 2024-04-02 PROCEDURE — G2211 COMPLEX E/M VISIT ADD ON: CPT

## 2024-04-02 PROCEDURE — 99215 OFFICE O/P EST HI 40 MIN: CPT

## 2024-04-02 RX ORDER — ATORVASTATIN CALCIUM 10 MG/1
10 TABLET, FILM COATED ORAL
Qty: 90 | Refills: 3 | Status: ACTIVE | COMMUNITY
Start: 2020-03-12 | End: 1900-01-01

## 2024-04-02 RX ORDER — APIXABAN 2.5 MG/1
2.5 TABLET, FILM COATED ORAL
Qty: 180 | Refills: 1 | Status: DISCONTINUED | COMMUNITY
Start: 2023-08-21 | End: 2024-03-02

## 2024-04-02 RX ORDER — TAMSULOSIN HYDROCHLORIDE 0.4 MG/1
0.4 CAPSULE ORAL
Qty: 90 | Refills: 3 | Status: ACTIVE | COMMUNITY
Start: 2019-06-07 | End: 1900-01-01

## 2024-04-02 RX ORDER — METOPROLOL SUCCINATE 50 MG/1
50 TABLET, EXTENDED RELEASE ORAL
Qty: 90 | Refills: 1 | Status: ACTIVE | COMMUNITY
Start: 2021-06-30 | End: 1900-01-01

## 2024-04-02 NOTE — PHYSICAL EXAM
[Normal Sclera/Conjunctiva] : normal sclera/conjunctiva [Supple] : supple [Normal Rate] : normal rate  [Normal S1, S2] : normal S1 and S2 [Pedal Pulses Present] : the pedal pulses are present [No Edema] : there was no peripheral edema [No Spinal Tenderness] : no spinal tenderness [No CVA Tenderness] : no CVA  tenderness [No Rash] : no rash [Normal] : affect was normal and insight and judgment were intact [de-identified] : irregular
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
## 2024-04-06 ENCOUNTER — TRANSCRIPTION ENCOUNTER (OUTPATIENT)
Age: 89
End: 2024-04-06

## 2024-04-06 LAB
ALBUMIN SERPL ELPH-MCNC: 3.9 G/DL
ALP BLD-CCNC: 128 U/L
ALT SERPL-CCNC: 44 U/L
ANION GAP SERPL CALC-SCNC: 9 MMOL/L
AST SERPL-CCNC: 42 U/L
BASOPHILS # BLD AUTO: 0.07 K/UL
BASOPHILS NFR BLD AUTO: 1.1 %
BILIRUB SERPL-MCNC: 0.6 MG/DL
BUN SERPL-MCNC: 28 MG/DL
CALCIUM SERPL-MCNC: 8.8 MG/DL
CHLORIDE SERPL-SCNC: 106 MMOL/L
CO2 SERPL-SCNC: 24 MMOL/L
CREAT SERPL-MCNC: 2.11 MG/DL
EGFR: 29 ML/MIN/1.73M2
EOSINOPHIL # BLD AUTO: 1.25 K/UL
EOSINOPHIL NFR BLD AUTO: 19.5 %
FERRITIN SERPL-MCNC: 410 NG/ML
GLUCOSE SERPL-MCNC: 127 MG/DL
HCT VFR BLD CALC: 34.4 %
HGB BLD-MCNC: 10.9 G/DL
IMM GRANULOCYTES NFR BLD AUTO: 0.6 %
IRON SATN MFR SERPL: 29 %
IRON SERPL-MCNC: 66 UG/DL
LYMPHOCYTES # BLD AUTO: 0.88 K/UL
LYMPHOCYTES NFR BLD AUTO: 13.7 %
MAN DIFF?: NORMAL
MCHC RBC-ENTMCNC: 31.7 GM/DL
MCHC RBC-ENTMCNC: 32.4 PG
MCV RBC AUTO: 102.4 FL
MONOCYTES # BLD AUTO: 0.56 K/UL
MONOCYTES NFR BLD AUTO: 8.7 %
NEUTROPHILS # BLD AUTO: 3.61 K/UL
NEUTROPHILS NFR BLD AUTO: 56.4 %
PLATELET # BLD AUTO: 141 K/UL
POTASSIUM SERPL-SCNC: 4.9 MMOL/L
PROT SERPL-MCNC: 6 G/DL
RBC # BLD: 3.36 M/UL
RBC # FLD: 16.5 %
SODIUM SERPL-SCNC: 139 MMOL/L
T4 FREE SERPL-MCNC: 1.1 NG/DL
TIBC SERPL-MCNC: 230 UG/DL
TSH SERPL-ACNC: 7.11 UIU/ML
UIBC SERPL-MCNC: 164 UG/DL
VIT B12 SERPL-MCNC: >2000 PG/ML
WBC # FLD AUTO: 6.41 K/UL

## 2024-04-06 RX ORDER — LEVOTHYROXINE SODIUM 0.07 MG/1
75 TABLET ORAL DAILY
Qty: 90 | Refills: 1 | Status: ACTIVE | COMMUNITY
Start: 2023-11-09 | End: 1900-01-01

## 2024-04-08 RX ORDER — AMIODARONE HYDROCHLORIDE 200 MG/1
200 TABLET ORAL
Qty: 90 | Refills: 1 | Status: ACTIVE | COMMUNITY
Start: 2021-12-08 | End: 1900-01-01

## 2024-04-08 NOTE — H&P ADULT - [facility] MLT D E PC
Jamila Mcgregor (:  1954) is a 69 y.o. female,Established patient, here for evaluation of the following chief complaint(s):  Back Pain (Upper back x 1 week and a half.)      ASSESSMENT/PLAN:  1. Muscle strain  Assessment & Plan:  No red flags at this time.  May use tizanidine 3 times daily as necessary warned of drowsiness.  Encouraged stretching and continued use of warm packs.  To follow-up with having continued difficulty.  Will send physical therapy.  2. Diabetes mellitus due to underlying condition with hyperglycemia, without long-term current use of insulin ([facility])  Assessment & Plan:  10# weight loss with Mounjaro  Feeling tired with drop in sugary diet  Feels she is eating enough  Will recheck A1C in 2 months  Orders:  -     Tirzepatide (MOUNJARO) 2.5 MG/0.5ML SOPN SC injection; Inject 0.5 mLs into the skin once a week, Disp-4 each, R-2Normal      No follow-ups on file.    SUBJECTIVE/OBJECTIVE:  Jamila presents today with 8 days of bilateral upper back pain rating down both arms.  She states that this started when she had been spending a day pulling weeds in her garden.  She has tried taking Tylenol ibuprofen without any relief.  She has tried stretching without relief.  Pain is made worse with stretching and moving.  Pain seems to be worse in the morning when she gets out of bed after she has been immobilized in her sleep.  Denies any numbness and tingling or loss of strength in her hands.  Denies any pain in her neck.  Pain is not changed with rotation of her neck.    Current Outpatient Medications   Medication Sig Dispense Refill    tiZANidine (ZANAFLEX) 2 MG tablet Take 1 tablet by mouth 3 times daily as needed (upper back pain) 30 tablet 0    Tirzepatide (MOUNJARO) 2.5 MG/0.5ML SOPN SC injection Inject 0.5 mLs into the skin once a week 4 each 2    simvastatin (ZOCOR) 20 MG tablet TAKE ONE TABLET BY MOUTH ONCE NIGHTLY 90 tablet 2    solifenacin (VESICARE) 5 MG tablet Solifenacin Oral     
regular rate and rhythm

## 2024-05-03 ENCOUNTER — APPOINTMENT (OUTPATIENT)
Dept: NEPHROLOGY | Facility: CLINIC | Age: 89
End: 2024-05-03

## 2024-05-06 ENCOUNTER — APPOINTMENT (OUTPATIENT)
Dept: NEPHROLOGY | Facility: CLINIC | Age: 89
End: 2024-05-06
Payer: MEDICARE

## 2024-05-06 VITALS
HEART RATE: 60 BPM | OXYGEN SATURATION: 97 % | HEIGHT: 66 IN | BODY MASS INDEX: 28.12 KG/M2 | DIASTOLIC BLOOD PRESSURE: 58 MMHG | WEIGHT: 175 LBS | SYSTOLIC BLOOD PRESSURE: 122 MMHG

## 2024-05-06 PROCEDURE — 99213 OFFICE O/P EST LOW 20 MIN: CPT

## 2024-05-06 RX ORDER — TORSEMIDE 20 MG/1
20 TABLET ORAL DAILY
Qty: 90 | Refills: 0 | Status: ACTIVE | COMMUNITY
Start: 2024-05-06 | End: 1900-01-01

## 2024-05-06 NOTE — HISTORY OF PRESENT ILLNESS
[___ Month(s) Ago] : [unfilled] month(s) ago [Ordering Test(s) ___] : ordering [unfilled] [Hypertensive Nephropathy] : hypertensive nephropathy [None] : ~he/she~ is currently asymptomatic [Diuretics] : diuretics [Antihypertensives] : antihypertensives [Vitamin D] : vitamin D [Iron Supplement] : iron supplement [Good Compliance] : good compliance  [Good Tolerance] : good tolerance  [Good Symptom Control] : good symptom control [FreeTextEntry1] : 89 year old man with past medical history of BPH, HTN, Gout, Pre-Diabetic, benign R sided renal mass s/p partial nephrectomy in 2006, here for follow up of CKD.  Today, no new complaints Patient reports no health related adverse event since last clinic visit.  NO ER/Hospitalization/urgent care visit.  Denies any cardiac or urinary complaints No dysuria, gross hematuria, SOB, LUTS. Reports BP has been well controlled.   Current: Pt seen/examined; no complaints. He feels fine and denies any symptoms.  -------------------------------------- 12/12 Patient presents for follow-up of CKD Patient seen and examined-feels well No acute complaint  He had labs done end of October with PCP-reviewed Medications reconciled  5/6/24. Patient presents for follow-up of CKD Patient seen and examined-feels well No acute complaint but LE swelling.   He had labs done end in April 2024 with PCP-reviewed Medications reconciled. eliquis stopped, vit B12 stopped by PCp.

## 2024-05-06 NOTE — REVIEW OF SYSTEMS
[Negative] : Heme/Lymph [Eye Pain] : no eye pain [Red Eyes] : eyes not red [Earache] : no earache [Nosebleeds] : no nosebleeds [Chest Pain] : no chest pain [Skin Lesions] : no skin lesions [Itching] : no itching [Dizziness] : no dizziness [Fainting] : no fainting [Anxiety] : no anxiety [Depression] : no depression [Muscle Weakness] : no muscle weakness

## 2024-05-06 NOTE — ASSESSMENT
[FreeTextEntry1] : 1) CKD III in the setting of long standing HTN, partial nephrectomy Scr stable; lytes wnl. Scr 1.94 in october kidney function has been stable around 1.8-2 range. Recent SCr in april 2024 is 2.1 with eGFR of 29. UACR <30 Continue current management   2) HTN/ Volume  Bp stable; will start torsemide 20 mg x od.  Continue Current regimen   3) Anemia of CKD with superimposed iron def Hb above goal EVANS for Hb < 10  continue po iron    4) BPH Continue Flomax  RTO in 3 months.     RTC in 4-6mo.

## 2024-05-06 NOTE — PHYSICAL EXAM
[General Appearance - Alert] : alert [General Appearance - In No Acute Distress] : in no acute distress [General Appearance - Well Nourished] : well nourished [General Appearance - Well Developed] : well developed [General Appearance - Well-Appearing] : healthy appearing [Sclera] : the sclera and conjunctiva were normal [Strabismus] : no strabismus was seen [Outer Ear] : the ears and nose were normal in appearance [Hearing Threshold Finger Rub Not [place]] : hearing was normal [Neck Appearance] : the appearance of the neck was normal [Neck Cervical Mass (___cm)] : no neck mass was observed [Jugular Venous Distention Increased] : there was no jugular-venous distention [Respiration, Rhythm And Depth] : normal respiratory rhythm and effort [Exaggerated Use Of Accessory Muscles For Inspiration] : no accessory muscle use [Auscultation Breath Sounds / Voice Sounds] : lungs were clear to auscultation bilaterally [Apical Impulse] : the apical impulse was normal [Heart Rate And Rhythm] : heart rate was normal and rhythm regular [Heart Sounds] : normal S1 and S2 [Abdomen Soft] : soft [Abdomen Tenderness] : non-tender [No CVA Tenderness] : no ~M costovertebral angle tenderness [Abnormal Walk] : normal gait [Skin Color & Pigmentation] : normal skin color and pigmentation [Skin Turgor] : normal skin turgor [] : no rash [Skin Lesions] : no skin lesions [No Focal Deficits] : no focal deficits [Oriented To Time, Place, And Person] : oriented to person, place, and time [Impaired Insight] : insight and judgment were intact [Affect] : the affect was normal [Mood] : the mood was normal [FreeTextEntry1] : LE 2+ edema

## 2024-05-13 NOTE — REVIEW OF SYSTEMS
[Joint Pain] : joint pain [Joint Stiffness] : joint stiffness [Back Pain] : back pain [Negative] : Psychiatric [Joint Swelling] : no joint swelling RESOLVED. [Skin Rash] : no skin rash

## 2024-05-21 ENCOUNTER — APPOINTMENT (OUTPATIENT)
Dept: FAMILY MEDICINE | Facility: CLINIC | Age: 89
End: 2024-05-21

## 2024-06-04 ENCOUNTER — APPOINTMENT (OUTPATIENT)
Dept: CARDIOLOGY | Facility: CLINIC | Age: 89
End: 2024-06-04

## 2024-06-13 ENCOUNTER — OFFICE (OUTPATIENT)
Dept: URBAN - METROPOLITAN AREA CLINIC 12 | Facility: CLINIC | Age: 89
Setting detail: OPHTHALMOLOGY
End: 2024-06-13
Payer: MEDICARE

## 2024-06-13 DIAGNOSIS — Z96.1: ICD-10-CM

## 2024-06-13 DIAGNOSIS — H35.3131: ICD-10-CM

## 2024-06-13 DIAGNOSIS — H40.013: ICD-10-CM

## 2024-06-13 PROCEDURE — 92083 EXTENDED VISUAL FIELD XM: CPT | Performed by: OPHTHALMOLOGY

## 2024-06-13 PROCEDURE — 92133 CPTRZD OPH DX IMG PST SGM ON: CPT | Performed by: OPHTHALMOLOGY

## 2024-06-13 PROCEDURE — 92014 COMPRE OPH EXAM EST PT 1/>: CPT | Performed by: OPHTHALMOLOGY

## 2024-06-13 ASSESSMENT — CONFRONTATIONAL VISUAL FIELD TEST (CVF)
OD_FINDINGS: FULL
OS_FINDINGS: FULL

## 2024-06-24 ENCOUNTER — NON-APPOINTMENT (OUTPATIENT)
Age: 89
End: 2024-06-24

## 2024-06-25 ENCOUNTER — APPOINTMENT (OUTPATIENT)
Dept: ELECTROPHYSIOLOGY | Facility: CLINIC | Age: 89
End: 2024-06-25
Payer: MEDICARE

## 2024-06-25 PROCEDURE — 93294 REM INTERROG EVL PM/LDLS PM: CPT

## 2024-06-25 PROCEDURE — 93296 REM INTERROG EVL PM/IDS: CPT

## 2024-06-28 ENCOUNTER — LABORATORY RESULT (OUTPATIENT)
Age: 89
End: 2024-06-28

## 2024-06-28 ENCOUNTER — APPOINTMENT (OUTPATIENT)
Dept: FAMILY MEDICINE | Facility: CLINIC | Age: 89
End: 2024-06-28
Payer: MEDICARE

## 2024-06-28 VITALS
RESPIRATION RATE: 16 BRPM | DIASTOLIC BLOOD PRESSURE: 55 MMHG | SYSTOLIC BLOOD PRESSURE: 126 MMHG | OXYGEN SATURATION: 97 % | WEIGHT: 170 LBS | HEART RATE: 59 BPM | TEMPERATURE: 97.4 F | BODY MASS INDEX: 27.32 KG/M2 | HEIGHT: 66 IN

## 2024-06-28 DIAGNOSIS — I48.91 UNSPECIFIED ATRIAL FIBRILLATION: ICD-10-CM

## 2024-06-28 DIAGNOSIS — N18.32 CHRONIC KIDNEY DISEASE, STAGE 3B: ICD-10-CM

## 2024-06-28 DIAGNOSIS — I10 ESSENTIAL (PRIMARY) HYPERTENSION: ICD-10-CM

## 2024-06-28 DIAGNOSIS — R60.0 LOCALIZED EDEMA: ICD-10-CM

## 2024-06-28 DIAGNOSIS — E03.9 HYPOTHYROIDISM, UNSPECIFIED: ICD-10-CM

## 2024-06-28 DIAGNOSIS — D64.9 ANEMIA, UNSPECIFIED: ICD-10-CM

## 2024-06-28 LAB
ALBUMIN SERPL ELPH-MCNC: 4 G/DL
ALP BLD-CCNC: 148 U/L
ALT SERPL-CCNC: 35 U/L
ANION GAP SERPL CALC-SCNC: 10 MMOL/L
AST SERPL-CCNC: 39 U/L
BILIRUB SERPL-MCNC: 0.9 MG/DL
BUN SERPL-MCNC: 28 MG/DL
CALCIUM SERPL-MCNC: 9.3 MG/DL
CHLORIDE SERPL-SCNC: 108 MMOL/L
CHOLEST SERPL-MCNC: 115 MG/DL
CO2 SERPL-SCNC: 22 MMOL/L
CREAT SERPL-MCNC: 1.95 MG/DL
EGFR: 31 ML/MIN/1.73M2
ESTIMATED AVERAGE GLUCOSE: 131 MG/DL
FERRITIN SERPL-MCNC: 518 NG/ML
GLUCOSE SERPL-MCNC: 134 MG/DL
HBA1C MFR BLD HPLC: 6.2 %
HDLC SERPL-MCNC: 63 MG/DL
IRON SATN MFR SERPL: 15 %
IRON SERPL-MCNC: 38 UG/DL
LDLC SERPL CALC-MCNC: 42 MG/DL
NONHDLC SERPL-MCNC: 52 MG/DL
POTASSIUM SERPL-SCNC: 4.9 MMOL/L
PROT SERPL-MCNC: 6.5 G/DL
SODIUM SERPL-SCNC: 140 MMOL/L
T4 FREE SERPL-MCNC: 1.2 NG/DL
TIBC SERPL-MCNC: 253 UG/DL
TRIGL SERPL-MCNC: 38 MG/DL
TSH SERPL-ACNC: 4.13 UIU/ML
UIBC SERPL-MCNC: 215 UG/DL

## 2024-06-28 PROCEDURE — 36415 COLL VENOUS BLD VENIPUNCTURE: CPT

## 2024-06-28 PROCEDURE — G2211 COMPLEX E/M VISIT ADD ON: CPT

## 2024-06-28 PROCEDURE — 99215 OFFICE O/P EST HI 40 MIN: CPT

## 2024-06-29 LAB
BASOPHILS # BLD AUTO: 0.06 K/UL
BASOPHILS NFR BLD AUTO: 0.8 %
EOSINOPHIL # BLD AUTO: 1.59 K/UL
EOSINOPHIL NFR BLD AUTO: 19.8 %
HCT VFR BLD CALC: 34.7 %
HGB BLD-MCNC: 11.1 G/DL
LYMPHOCYTES # BLD AUTO: 0.55 K/UL
LYMPHOCYTES NFR BLD AUTO: 6.9 %
MAN DIFF?: NORMAL
MCHC RBC-ENTMCNC: 31.6 PG
MCHC RBC-ENTMCNC: 32 GM/DL
MCV RBC AUTO: 98.9 FL
MONOCYTES # BLD AUTO: 0.21 K/UL
MONOCYTES NFR BLD AUTO: 2.6 %
NEUTROPHILS # BLD AUTO: 5.53 K/UL
NEUTROPHILS NFR BLD AUTO: 69 %
PLATELET # BLD AUTO: 165 K/UL
RBC # BLD: 3.51 M/UL
RBC # FLD: 16.4 %
WBC # FLD AUTO: 8.02 K/UL

## 2024-07-01 NOTE — H&P ADULT - PROBLEM SELECTOR PLAN 4
Addended by: ROB VALDES on: 7/1/2024 04:59 PM     Modules accepted: Orders    
- stable  - hold oral anti-diabetic medications  - consistent carb diet

## 2024-07-03 ENCOUNTER — TRANSCRIPTION ENCOUNTER (OUTPATIENT)
Age: 89
End: 2024-07-03

## 2024-07-22 ENCOUNTER — APPOINTMENT (OUTPATIENT)
Dept: CARDIOLOGY | Facility: CLINIC | Age: 89
End: 2024-07-22
Payer: MEDICARE

## 2024-07-22 ENCOUNTER — NON-APPOINTMENT (OUTPATIENT)
Age: 89
End: 2024-07-22

## 2024-07-22 VITALS
OXYGEN SATURATION: 97 % | DIASTOLIC BLOOD PRESSURE: 60 MMHG | BODY MASS INDEX: 27 KG/M2 | WEIGHT: 168 LBS | HEART RATE: 60 BPM | HEIGHT: 66 IN | SYSTOLIC BLOOD PRESSURE: 112 MMHG

## 2024-07-22 DIAGNOSIS — I48.91 UNSPECIFIED ATRIAL FIBRILLATION: ICD-10-CM

## 2024-07-22 DIAGNOSIS — Z95.0 PRESENCE OF CARDIAC PACEMAKER: ICD-10-CM

## 2024-07-22 DIAGNOSIS — I45.9 CONDUCTION DISORDER, UNSPECIFIED: ICD-10-CM

## 2024-07-22 PROCEDURE — 99214 OFFICE O/P EST MOD 30 MIN: CPT | Mod: 25

## 2024-07-22 PROCEDURE — 93000 ELECTROCARDIOGRAM COMPLETE: CPT

## 2024-07-22 NOTE — PHYSICAL EXAM
[Well Developed] : well developed [Well Nourished] : well nourished [No Acute Distress] : no acute distress [Normal Conjunctiva] : normal conjunctiva [Normal Venous Pressure] : normal venous pressure [No Carotid Bruit] : no carotid bruit [Normal S1, S2] : normal S1, S2 [No Murmur] : no murmur [No Rub] : no rub [No Gallop] : no gallop [Clear Lung Fields] : clear lung fields [Good Air Entry] : good air entry [No Respiratory Distress] : no respiratory distress  [Soft] : abdomen soft [Non Tender] : non-tender [No Masses/organomegaly] : no masses/organomegaly [Normal Bowel Sounds] : normal bowel sounds [Normal Gait] : normal gait [No Edema] : no edema [No Cyanosis] : no cyanosis [No Clubbing] : no clubbing [No Varicosities] : no varicosities [No Rash] : no rash [No Skin Lesions] : no skin lesions [Moves all extremities] : moves all extremities [No Focal Deficits] : no focal deficits [Normal Speech] : normal speech [Alert and Oriented] : alert and oriented [Normal memory] : normal memory [de-identified] : bruising to R side of face and RLE [de-identified] : RLE swelling with bruising
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
## 2024-07-22 NOTE — REVIEW OF SYSTEMS
[Dizziness] : dizziness [Negative] : Heme/Lymph [SOB] : no shortness of breath [Dyspnea on exertion] : dyspnea during exertion [Chest Discomfort] : no chest discomfort [Lower Ext Edema] : no extremity edema [Leg Claudication] : no intermittent leg claudication [Palpitations] : no palpitations [Orthopnea] : no orthopnea [PND] : no PND [Syncope] : no syncope

## 2024-07-22 NOTE — DISCUSSION/SUMMARY
[EKG obtained to assist in diagnosis and management of assessed problem(s)] : EKG obtained to assist in diagnosis and management of assessed problem(s) [FreeTextEntry1] : Pt is a 89 y/o M who presents today for f/u. 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  TTE 02/2019 EF 77% mild-mod MR/TR, mild AI, mild-mod LVH Nuclear stress test 02/2019 normal myocardial perfusion  AF: HR well controlled  c/w amio and BB He has had a total of 7 falls over the past year.  I think that at this time the risk is greater then the benefit for AC - will stop Eliquis Last PPM check 06/2024  HTN: well controlled c/w BB he will occasionally take torsemide but has not needed it regularly I advised that if his breathing worsens to restart torsemide Advised low salt diet, regular exercise, weight loss   HLD: c/w statin Advised lifestyle modifications   Pt will return in 6 mnths or sooner as needed but I encouraged communication via phone or portal if necessary.    The described plan was discussed with the pt.  All questions and concerns were addressed to the best of my knowledge.

## 2024-07-25 ENCOUNTER — NON-APPOINTMENT (OUTPATIENT)
Age: 89
End: 2024-07-25

## 2024-08-13 ENCOUNTER — APPOINTMENT (OUTPATIENT)
Dept: FAMILY MEDICINE | Facility: CLINIC | Age: 89
End: 2024-08-13
Payer: COMMERCIAL

## 2024-08-13 VITALS
SYSTOLIC BLOOD PRESSURE: 124 MMHG | RESPIRATION RATE: 16 BRPM | WEIGHT: 169 LBS | HEART RATE: 59 BPM | HEIGHT: 66 IN | OXYGEN SATURATION: 98 % | BODY MASS INDEX: 27.16 KG/M2 | DIASTOLIC BLOOD PRESSURE: 64 MMHG | TEMPERATURE: 98 F

## 2024-08-13 DIAGNOSIS — E03.9 HYPOTHYROIDISM, UNSPECIFIED: ICD-10-CM

## 2024-08-13 DIAGNOSIS — Z00.00 ENCOUNTER FOR GENERAL ADULT MEDICAL EXAMINATION W/OUT ABNORMAL FINDINGS: ICD-10-CM

## 2024-08-13 DIAGNOSIS — R73.09 OTHER ABNORMAL GLUCOSE: ICD-10-CM

## 2024-08-13 DIAGNOSIS — N18.9 CHRONIC KIDNEY DISEASE, UNSPECIFIED: ICD-10-CM

## 2024-08-13 DIAGNOSIS — I10 ESSENTIAL (PRIMARY) HYPERTENSION: ICD-10-CM

## 2024-08-13 DIAGNOSIS — I48.91 UNSPECIFIED ATRIAL FIBRILLATION: ICD-10-CM

## 2024-08-13 PROCEDURE — G0439: CPT

## 2024-08-13 NOTE — ASSESSMENT
[FreeTextEntry1] : Annual SBIRT negative Depression screen negative EKG in chart 7/24  Vaccines  Adacel declined Pneumovax 2020, Prevnar 2015 Shingrix declined Flu 10/2023 COVID booster declined  DEXA declined  CKD Patient seen nephrology Dr Samano 7/23. Has follow up 1/2024  Anemia CKD and iron def on iron Continue iron  Labs in chart 5/23  Hypertension: Blood pressure okay, On metoprolol succinate 25 mg.  HLD On ATorvastatin 10 mg qd Check labs  Atrial fibrillation: Seen by cardiology 7/2024 on metoprolol 50 mg qd, amiodarone and Eliquis 2.5 mg twice daily. Dr Marquez PVKASI seeing cardiologist  Follow up in 3 months.

## 2024-08-13 NOTE — PHYSICAL EXAM
[Normal Sclera/Conjunctiva] : normal sclera/conjunctiva [Supple] : supple [Normal Rate] : normal rate  [Normal S1, S2] : normal S1 and S2 [Pedal Pulses Present] : the pedal pulses are present [No Edema] : there was no peripheral edema [No CVA Tenderness] : no CVA  tenderness [No Spinal Tenderness] : no spinal tenderness [No Rash] : no rash [Normal] : affect was normal and insight and judgment were intact [de-identified] : irregular
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
## 2024-08-13 NOTE — HEALTH RISK ASSESSMENT
[No] : In the past 12 months have you used drugs other than those required for medical reasons? No [No falls in past year] : Patient reported no falls in the past year [0] : 2) Feeling down, depressed, or hopeless: Not at all (0) [PHQ-2 Negative - No further assessment needed] : PHQ-2 Negative - No further assessment needed [Never] : Never [None] : None [With Family] : lives with family [Retired] : retired [Fully functional (bathing, dressing, toileting, transferring, walking, feeding)] : Fully functional (bathing, dressing, toileting, transferring, walking, feeding) [Fully functional (using the telephone, shopping, preparing meals, housekeeping, doing laundry, using] : Fully functional and needs no help or supervision to perform IADLs (using the telephone, shopping, preparing meals, housekeeping, doing laundry, using transportation, managing medications and managing finances) [Reports changes in hearing] : Reports changes in hearing [Smoke Detector] : smoke detector [Carbon Monoxide Detector] : carbon monoxide detector [Seat Belt] :  uses seat belt [Sunscreen] : uses sunscreen [Reviewed no changes] : Reviewed, no changes [Designated Healthcare Proxy] : Designated healthcare proxy [Relationship: ___] : Relationship: [unfilled] [Very Good] : ~his/her~  mood as very good [Little interest or pleasure doing things] : 1) Little interest or pleasure doing things [Feeling down, depressed, or hopeless] : 2) Feeling down, depressed, or hopeless [Audit-CScore] : 0 [[id number]] : 0 [NO] : No [Change in mental status noted] : No change in mental status noted [Reports changes in vision] : Reports no changes in vision [Reports changes in dental health] : Reports no changes in dental health [TB Exposure] : is not being exposed to tuberculosis [de-identified] : Has hearing Aide [de-identified] : seen by ophthalmologist recently [AdvancecareDate] : 8/2024

## 2024-08-14 ENCOUNTER — APPOINTMENT (OUTPATIENT)
Dept: NEPHROLOGY | Facility: CLINIC | Age: 89
End: 2024-08-14

## 2024-09-23 ENCOUNTER — NON-APPOINTMENT (OUTPATIENT)
Age: 89
End: 2024-09-23

## 2024-09-23 ENCOUNTER — APPOINTMENT (OUTPATIENT)
Dept: ELECTROPHYSIOLOGY | Facility: CLINIC | Age: 89
End: 2024-09-23
Payer: MEDICARE

## 2024-09-24 PROCEDURE — 93294 REM INTERROG EVL PM/LDLS PM: CPT

## 2024-09-24 PROCEDURE — 93296 REM INTERROG EVL PM/IDS: CPT

## 2024-10-01 ENCOUNTER — RX RENEWAL (OUTPATIENT)
Age: 89
End: 2024-10-01

## 2024-10-01 RX ORDER — AMIODARONE HYDROCHLORIDE 200 MG/1
200 TABLET ORAL
Qty: 90 | Refills: 0 | Status: ACTIVE | COMMUNITY
Start: 2024-10-01 | End: 1900-01-01

## 2024-10-14 ENCOUNTER — NON-APPOINTMENT (OUTPATIENT)
Age: 89
End: 2024-10-14

## 2024-12-24 ENCOUNTER — APPOINTMENT (OUTPATIENT)
Dept: ELECTROPHYSIOLOGY | Facility: CLINIC | Age: 88
End: 2024-12-24

## 2024-12-24 PROCEDURE — 93296 REM INTERROG EVL PM/IDS: CPT

## 2024-12-24 PROCEDURE — 93294 REM INTERROG EVL PM/LDLS PM: CPT

## 2025-03-25 ENCOUNTER — APPOINTMENT (OUTPATIENT)
Dept: ELECTROPHYSIOLOGY | Facility: CLINIC | Age: 89
End: 2025-03-25

## 2025-03-26 NOTE — ED PROVIDER NOTE - TOBACCO USE
PHYSICAL EXAM:  GENERAL: No acute distress, well-developed  CHEST/LUNG: CTAB  HEART: Regular rate and rhythm.   ABDOMEN: Soft, mild epigastric tenderness to palpation, mild distention, no rebound, no guarding  EXTREMITIES:  2+ peripheral pulses b/l, No clubbing, cyanosis, or edema
Never smoker